# Patient Record
Sex: MALE | Race: ASIAN | NOT HISPANIC OR LATINO | Employment: OTHER
[De-identification: names, ages, dates, MRNs, and addresses within clinical notes are randomized per-mention and may not be internally consistent; named-entity substitution may affect disease eponyms.]

---

## 2017-03-02 ENCOUNTER — HOSPITAL (OUTPATIENT)
Dept: OTHER | Age: 77
End: 2017-03-02
Attending: INTERNAL MEDICINE

## 2017-04-14 ENCOUNTER — PRIOR ORIGINAL RECORDS (OUTPATIENT)
Dept: OTHER | Age: 77
End: 2017-04-14

## 2017-04-20 ENCOUNTER — PRIOR ORIGINAL RECORDS (OUTPATIENT)
Dept: OTHER | Age: 77
End: 2017-04-20

## 2017-04-20 LAB
BUN: 43 MG/DL
CALCIUM: 9.5 MG/DL
CHLORIDE: 100 MEQ/L
CREATININE, SERUM: 2.54 MG/DL
GLUCOSE: 114 MG/DL
GLUCOSE: 114 MG/DL
HEMOGLOBIN A1C: 7.9 %
POTASSIUM, SERUM: 4.4 MEQ/L
SODIUM: 136 MEQ/L
VITAMIN D 25-OH: 44 NG/ML

## 2017-04-27 ENCOUNTER — HOSPITAL (OUTPATIENT)
Dept: OTHER | Age: 77
End: 2017-04-27
Attending: INTERNAL MEDICINE

## 2017-04-27 ENCOUNTER — PRIOR ORIGINAL RECORDS (OUTPATIENT)
Dept: OTHER | Age: 77
End: 2017-04-27

## 2017-04-27 LAB
ANION GAP SERPL CALC-SCNC: 12 MMOL/L (ref 10–20)
BNP SERPL-MCNC: 183 PG/ML
BUN SERPL-MCNC: 44 MG/DL (ref 6–20)
BUN/CREAT SERPL: 18 (ref 7–25)
CALCIUM SERPL-MCNC: 9.1 MG/DL (ref 8.4–10.2)
CHLORIDE: 99 MMOL/L (ref 98–107)
CO2 SERPL-SCNC: 28 MMOL/L (ref 21–32)
CREAT SERPL-MCNC: 2.39 MG/DL (ref 0.67–1.17)
GLUCOSE SERPL-MCNC: 204 MG/DL (ref 65–99)
LENGTH OF FAST TIME PATIENT: ABNORMAL HR
POTASSIUM SERPL-SCNC: 3.9 MMOL/L (ref 3.4–5.1)
SODIUM SERPL-SCNC: 135 MMOL/L (ref 135–145)

## 2017-04-28 ENCOUNTER — PRIOR ORIGINAL RECORDS (OUTPATIENT)
Dept: OTHER | Age: 77
End: 2017-04-28

## 2017-04-28 LAB
BNP: 183 PMOL/L
BUN: 44 MG/DL
CALCIUM: 9.1 MG/DL
CHLORIDE: 99 MEQ/L
CREATININE, SERUM: 2.39 MG/DL
GLUCOSE: 204 MG/DL
POTASSIUM, SERUM: 3.9 MEQ/L
SODIUM: 135 MEQ/L

## 2017-05-04 ENCOUNTER — PRIOR ORIGINAL RECORDS (OUTPATIENT)
Dept: OTHER | Age: 77
End: 2017-05-04

## 2017-05-04 ENCOUNTER — MYAURORA ACCOUNT LINK (OUTPATIENT)
Dept: OTHER | Age: 77
End: 2017-05-04

## 2017-06-07 ENCOUNTER — HOSPITAL (OUTPATIENT)
Dept: OTHER | Age: 77
End: 2017-06-07
Attending: INTERNAL MEDICINE

## 2017-06-30 ENCOUNTER — PRIOR ORIGINAL RECORDS (OUTPATIENT)
Dept: OTHER | Age: 77
End: 2017-06-30

## 2017-07-12 ENCOUNTER — HOSPITAL (OUTPATIENT)
Dept: OTHER | Age: 77
End: 2017-07-12
Attending: INTERNAL MEDICINE

## 2017-07-31 ENCOUNTER — HOSPITAL (OUTPATIENT)
Dept: OTHER | Age: 77
End: 2017-07-31
Attending: INTERNAL MEDICINE

## 2018-02-22 ENCOUNTER — HOSPITAL (OUTPATIENT)
Dept: OTHER | Age: 78
End: 2018-02-22
Attending: INTERNAL MEDICINE

## 2018-03-28 ENCOUNTER — PRIOR ORIGINAL RECORDS (OUTPATIENT)
Dept: OTHER | Age: 78
End: 2018-03-28

## 2018-04-04 ENCOUNTER — PRIOR ORIGINAL RECORDS (OUTPATIENT)
Dept: OTHER | Age: 78
End: 2018-04-04

## 2018-04-10 ENCOUNTER — PRIOR ORIGINAL RECORDS (OUTPATIENT)
Dept: OTHER | Age: 78
End: 2018-04-10

## 2018-07-24 ENCOUNTER — MYAURORA ACCOUNT LINK (OUTPATIENT)
Dept: OTHER | Age: 78
End: 2018-07-24

## 2018-07-24 ENCOUNTER — PRIOR ORIGINAL RECORDS (OUTPATIENT)
Dept: OTHER | Age: 78
End: 2018-07-24

## 2018-07-25 LAB
CALCIUM: 8.9 MG/DL
CHLORIDE: 98 MEQ/L
CREATININE, SERUM: 2.46 MG/DL
GLUCOSE: 173 MG/DL
POTASSIUM, SERUM: 4.3 MEQ/L
SODIUM: 132 MEQ/L

## 2018-08-31 ENCOUNTER — HOSPITAL (OUTPATIENT)
Dept: OTHER | Age: 78
End: 2018-08-31
Attending: INTERNAL MEDICINE

## 2018-12-13 ENCOUNTER — HOSPITAL (OUTPATIENT)
Dept: OTHER | Age: 78
End: 2018-12-13
Attending: SPECIALIST

## 2019-01-02 ENCOUNTER — HOSPITAL (OUTPATIENT)
Dept: OTHER | Age: 79
End: 2019-01-02
Attending: INTERNAL MEDICINE

## 2019-01-03 ENCOUNTER — HOSPITAL (OUTPATIENT)
Dept: OTHER | Age: 79
End: 2019-01-03
Attending: SPECIALIST

## 2019-02-28 VITALS
HEART RATE: 58 BPM | WEIGHT: 194 LBS | DIASTOLIC BLOOD PRESSURE: 60 MMHG | BODY MASS INDEX: 34.38 KG/M2 | HEIGHT: 63 IN | SYSTOLIC BLOOD PRESSURE: 110 MMHG

## 2019-02-28 VITALS — SYSTOLIC BLOOD PRESSURE: 128 MMHG | HEART RATE: 60 BPM | DIASTOLIC BLOOD PRESSURE: 70 MMHG | WEIGHT: 194 LBS

## 2019-02-28 VITALS — HEART RATE: 62 BPM | WEIGHT: 195 LBS | SYSTOLIC BLOOD PRESSURE: 128 MMHG | DIASTOLIC BLOOD PRESSURE: 60 MMHG

## 2019-03-01 VITALS
SYSTOLIC BLOOD PRESSURE: 135 MMHG | OXYGEN SATURATION: 99 % | HEART RATE: 53 BPM | WEIGHT: 193.5 LBS | DIASTOLIC BLOOD PRESSURE: 46 MMHG | BODY MASS INDEX: 34.29 KG/M2 | HEIGHT: 63 IN

## 2019-03-01 VITALS
RESPIRATION RATE: 16 BRPM | DIASTOLIC BLOOD PRESSURE: 58 MMHG | HEIGHT: 63 IN | BODY MASS INDEX: 33.84 KG/M2 | WEIGHT: 191 LBS | SYSTOLIC BLOOD PRESSURE: 128 MMHG | HEART RATE: 56 BPM

## 2019-06-06 ENCOUNTER — HOSPITAL (OUTPATIENT)
Dept: OTHER | Age: 79
End: 2019-06-06
Attending: INTERNAL MEDICINE

## 2019-07-02 ENCOUNTER — HOSPITAL (OUTPATIENT)
Dept: OTHER | Age: 79
End: 2019-07-02
Attending: EMERGENCY MEDICINE

## 2019-07-02 LAB
APPEARANCE UR: CLEAR
BILIRUB UR QL STRIP: NEGATIVE
COLOR UR: YELLOW
GLUCOSE UR STRIP-MCNC: NEGATIVE MG/DL
HEMOCCULT STL QL: NEGATIVE
KETONES UR STRIP-MCNC: NEGATIVE MG/DL
LEUKOCYTE ESTERASE UR QL STRIP: NEGATIVE
NITRITE UR QL STRIP: NEGATIVE
PH UR STRIP: 5.5 UNITS (ref 5–7)
PROT UR STRIP-MCNC: NEGATIVE MG/DL
SP GR UR STRIP: 1.01 (ref 1–1.03)
UROBILINOGEN UR STRIP-MCNC: 0.2 MG/DL (ref 0–1)

## 2019-07-06 ENCOUNTER — HOSPITAL ENCOUNTER (EMERGENCY)
Facility: HOSPITAL | Age: 79
Discharge: HOME | End: 2019-07-06
Attending: EMERGENCY MEDICINE
Payer: MEDICAID

## 2019-07-06 ENCOUNTER — APPOINTMENT (OUTPATIENT)
Dept: CT IMAGING | Facility: HOSPITAL | Age: 79
End: 2019-07-06
Attending: EMERGENCY MEDICINE
Payer: MEDICAID

## 2019-07-06 VITALS
RESPIRATION RATE: 16 BRPM | OXYGEN SATURATION: 95 % | SYSTOLIC BLOOD PRESSURE: 139 MMHG | TEMPERATURE: 99 F | HEIGHT: 63 IN | WEIGHT: 199 LBS | HEART RATE: 58 BPM | DIASTOLIC BLOOD PRESSURE: 71 MMHG | BODY MASS INDEX: 35.26 KG/M2

## 2019-07-06 DIAGNOSIS — M54.9 BACK PAIN WITH RADIATION: Primary | ICD-10-CM

## 2019-07-06 LAB
ALBUMIN SERPL-MCNC: 3.5 G/DL (ref 3.4–5)
ALBUMIN/GLOB SERPL: 0.8 {RATIO} (ref 1–2)
ALP LIVER SERPL-CCNC: 88 U/L (ref 45–117)
ALT SERPL-CCNC: 20 U/L (ref 16–61)
ANION GAP SERPL CALC-SCNC: 7 MMOL/L (ref 0–18)
AST SERPL-CCNC: 19 U/L (ref 15–37)
BASOPHILS # BLD AUTO: 0.04 X10(3) UL (ref 0–0.2)
BASOPHILS NFR BLD AUTO: 0.4 %
BILIRUB SERPL-MCNC: 0.6 MG/DL (ref 0.1–2)
BILIRUB UR QL STRIP.AUTO: NEGATIVE
BUN BLD-MCNC: 60 MG/DL (ref 7–18)
BUN/CREAT SERPL: 19.6 (ref 10–20)
CALCIUM BLD-MCNC: 9.1 MG/DL (ref 8.5–10.1)
CHLORIDE SERPL-SCNC: 101 MMOL/L (ref 98–112)
CLARITY UR REFRACT.AUTO: CLEAR
CO2 SERPL-SCNC: 26 MMOL/L (ref 21–32)
COLOR UR AUTO: YELLOW
CREAT BLD-MCNC: 3.06 MG/DL (ref 0.7–1.3)
DEPRECATED RDW RBC AUTO: 45.2 FL (ref 35.1–46.3)
EOSINOPHIL # BLD AUTO: 0.31 X10(3) UL (ref 0–0.7)
EOSINOPHIL NFR BLD AUTO: 3.4 %
ERYTHROCYTE [DISTWIDTH] IN BLOOD BY AUTOMATED COUNT: 14.1 % (ref 11–15)
GLOBULIN PLAS-MCNC: 4.5 G/DL (ref 2.8–4.4)
GLUCOSE BLD-MCNC: 170 MG/DL (ref 70–99)
GLUCOSE UR STRIP.AUTO-MCNC: NEGATIVE MG/DL
HCT VFR BLD AUTO: 32.2 % (ref 39–53)
HGB BLD-MCNC: 10.7 G/DL (ref 13–17.5)
IMM GRANULOCYTES # BLD AUTO: 0.03 X10(3) UL (ref 0–1)
IMM GRANULOCYTES NFR BLD: 0.3 %
KETONES UR STRIP.AUTO-MCNC: NEGATIVE MG/DL
LEUKOCYTE ESTERASE UR QL STRIP.AUTO: NEGATIVE
LYMPHOCYTES # BLD AUTO: 2.03 X10(3) UL (ref 1–4)
LYMPHOCYTES NFR BLD AUTO: 22.3 %
M PROTEIN MFR SERPL ELPH: 8 G/DL (ref 6.4–8.2)
MCH RBC QN AUTO: 29.1 PG (ref 26–34)
MCHC RBC AUTO-ENTMCNC: 33.2 G/DL (ref 31–37)
MCV RBC AUTO: 87.5 FL (ref 80–100)
MONOCYTES # BLD AUTO: 1.04 X10(3) UL (ref 0.1–1)
MONOCYTES NFR BLD AUTO: 11.4 %
NEUTROPHILS # BLD AUTO: 5.65 X10 (3) UL (ref 1.5–7.7)
NEUTROPHILS # BLD AUTO: 5.65 X10(3) UL (ref 1.5–7.7)
NEUTROPHILS NFR BLD AUTO: 62.2 %
NITRITE UR QL STRIP.AUTO: NEGATIVE
OSMOLALITY SERPL CALC.SUM OF ELEC: 299 MOSM/KG (ref 275–295)
PH UR STRIP.AUTO: 5 [PH] (ref 4.5–8)
PLATELET # BLD AUTO: 189 10(3)UL (ref 150–450)
POTASSIUM SERPL-SCNC: 3.8 MMOL/L (ref 3.5–5.1)
PROT UR STRIP.AUTO-MCNC: NEGATIVE MG/DL
RBC # BLD AUTO: 3.68 X10(6)UL (ref 3.8–5.8)
RBC UR QL AUTO: NEGATIVE
SODIUM SERPL-SCNC: 134 MMOL/L (ref 136–145)
SP GR UR STRIP.AUTO: 1.01 (ref 1–1.03)
UROBILINOGEN UR STRIP.AUTO-MCNC: <2 MG/DL
WBC # BLD AUTO: 9.1 X10(3) UL (ref 4–11)

## 2019-07-06 PROCEDURE — 99284 EMERGENCY DEPT VISIT MOD MDM: CPT

## 2019-07-06 PROCEDURE — 81003 URINALYSIS AUTO W/O SCOPE: CPT | Performed by: EMERGENCY MEDICINE

## 2019-07-06 PROCEDURE — 85025 COMPLETE CBC W/AUTO DIFF WBC: CPT | Performed by: EMERGENCY MEDICINE

## 2019-07-06 PROCEDURE — 96374 THER/PROPH/DIAG INJ IV PUSH: CPT

## 2019-07-06 PROCEDURE — 74176 CT ABD & PELVIS W/O CONTRAST: CPT | Performed by: EMERGENCY MEDICINE

## 2019-07-06 PROCEDURE — 80053 COMPREHEN METABOLIC PANEL: CPT | Performed by: EMERGENCY MEDICINE

## 2019-07-06 RX ORDER — TRAMADOL HYDROCHLORIDE 50 MG/1
TABLET ORAL EVERY 6 HOURS PRN
Qty: 10 TABLET | Refills: 0 | Status: SHIPPED | OUTPATIENT
Start: 2019-07-06 | End: 2019-07-13

## 2019-07-06 RX ORDER — CARVEDILOL 6.25 MG/1
6.25 TABLET ORAL DAILY
COMMUNITY

## 2019-07-06 RX ORDER — TAMSULOSIN HYDROCHLORIDE 0.4 MG/1
0.4 CAPSULE ORAL DAILY
COMMUNITY

## 2019-07-06 RX ORDER — OMEPRAZOLE 40 MG/1
40 CAPSULE, DELAYED RELEASE ORAL DAILY
COMMUNITY

## 2019-07-06 RX ORDER — GLIPIZIDE 5 MG/1
5 TABLET ORAL 2 TIMES DAILY
Status: ON HOLD | COMMUNITY
End: 2021-08-28

## 2019-07-06 RX ORDER — MORPHINE SULFATE 4 MG/ML
2 INJECTION, SOLUTION INTRAMUSCULAR; INTRAVENOUS EVERY 30 MIN PRN
Status: DISCONTINUED | OUTPATIENT
Start: 2019-07-06 | End: 2019-07-06

## 2019-07-06 NOTE — ED NOTES
Round on pt. Pt sleeping on arrival to room. Daughter at bedsie. Pt easily arouseable. Pt sts back pain is better.  Pt pending transport to CT

## 2019-07-06 NOTE — ED NOTES
Pt to ED with daughter for back pain x1 week. Pt was seen by MD this week for back pain, sts pain is not any better. Denies any injury. Pt taking Mather at home for pain.

## 2019-07-06 NOTE — ED NOTES
Round on pt. Pt sleeping on arrival to room but easily arouseable. Pt provided urinal for urine sample. Daughter at bedside.  Updated on poc

## 2019-07-06 NOTE — ED PROVIDER NOTES
Patient Seen in: [facility] Emergency Department    History   Patient presents with:  Back Pain (musculoskeletal)    Stated Complaint: BACK PAIN    HPI    This is a pleasant 61-year-old male presenting to the emergency department for back pain and a Globulin  4.5 (*)     A/G Ratio 0.8 (*)     All other components within normal limits   CBC W/ DIFFERENTIAL - Abnormal; Notable for the following components:    RBC 3.68 (*)     HGB 10.7 (*)     HCT 32.2 (*)     Monocyte Absolute 1.04 (*)     All other com

## 2019-07-11 ENCOUNTER — HOSPITAL (OUTPATIENT)
Dept: OTHER | Age: 79
End: 2019-07-11
Attending: INTERNAL MEDICINE

## 2019-08-22 ENCOUNTER — TELEPHONE (OUTPATIENT)
Dept: NEUROSURGERY | Age: 79
End: 2019-08-22

## 2019-09-11 ENCOUNTER — HOSPITAL (OUTPATIENT)
Dept: OTHER | Age: 79
End: 2019-09-11
Attending: INTERNAL MEDICINE

## 2021-03-13 ENCOUNTER — HOSPITAL ENCOUNTER (OUTPATIENT)
Dept: CT IMAGING | Age: 81
Discharge: HOME | End: 2021-03-13
Attending: INTERNAL MEDICINE

## 2021-03-13 ENCOUNTER — HOSPITAL ENCOUNTER (OUTPATIENT)
Dept: MRI IMAGING | Age: 81
Discharge: HOME | End: 2021-03-13
Attending: INTERNAL MEDICINE

## 2021-03-13 DIAGNOSIS — R42 DIZZINESS AND GIDDINESS: ICD-10-CM

## 2021-03-13 DIAGNOSIS — C64.9 ADENOCARCINOMA, RENAL CELL (CMD): ICD-10-CM

## 2021-03-13 PROCEDURE — 74176 CT ABD & PELVIS W/O CONTRAST: CPT

## 2021-03-13 PROCEDURE — 70551 MRI BRAIN STEM W/O DYE: CPT

## 2021-08-28 ENCOUNTER — WALK IN (OUTPATIENT)
Dept: URGENT CARE | Age: 81
End: 2021-08-28
Attending: EMERGENCY MEDICINE

## 2021-08-28 ENCOUNTER — HOSPITAL ENCOUNTER (OUTPATIENT)
Facility: HOSPITAL | Age: 81
Setting detail: OBSERVATION
Discharge: HOME | End: 2021-08-30
Attending: EMERGENCY MEDICINE | Admitting: INTERNAL MEDICINE
Payer: MEDICARE

## 2021-08-28 ENCOUNTER — APPOINTMENT (OUTPATIENT)
Dept: ULTRASOUND IMAGING | Facility: HOSPITAL | Age: 81
End: 2021-08-28
Attending: EMERGENCY MEDICINE
Payer: MEDICARE

## 2021-08-28 ENCOUNTER — APPOINTMENT (OUTPATIENT)
Dept: GENERAL RADIOLOGY | Facility: HOSPITAL | Age: 81
End: 2021-08-28
Attending: EMERGENCY MEDICINE
Payer: MEDICARE

## 2021-08-28 VITALS — OXYGEN SATURATION: 100 % | DIASTOLIC BLOOD PRESSURE: 75 MMHG | HEART RATE: 64 BPM | SYSTOLIC BLOOD PRESSURE: 144 MMHG

## 2021-08-28 DIAGNOSIS — M79.602 LEFT ARM PAIN: ICD-10-CM

## 2021-08-28 DIAGNOSIS — M19.032 ARTHRITIS OF WRIST, LEFT: ICD-10-CM

## 2021-08-28 DIAGNOSIS — R07.9 CHEST PAIN OF UNCERTAIN ETIOLOGY: Primary | ICD-10-CM

## 2021-08-28 DIAGNOSIS — R60.0 EDEMA OF LEFT UPPER ARM: ICD-10-CM

## 2021-08-28 DIAGNOSIS — R07.9 CHEST PAIN, UNSPECIFIED TYPE: Primary | ICD-10-CM

## 2021-08-28 PROBLEM — D64.9 ANEMIA: Status: ACTIVE | Noted: 2021-08-28

## 2021-08-28 PROBLEM — E87.1 HYPONATREMIA: Status: ACTIVE | Noted: 2021-08-28

## 2021-08-28 PROBLEM — R73.9 HYPERGLYCEMIA: Status: ACTIVE | Noted: 2021-08-28

## 2021-08-28 LAB
ALBUMIN SERPL-MCNC: 2.9 G/DL (ref 3.4–5)
ALBUMIN/GLOB SERPL: 0.6 {RATIO} (ref 1–2)
ALP LIVER SERPL-CCNC: 82 U/L
ALT SERPL-CCNC: 14 U/L
ANION GAP SERPL CALC-SCNC: 8 MMOL/L (ref 0–18)
AST SERPL-CCNC: 14 U/L (ref 15–37)
ATRIAL RATE (BPM): 65
BASOPHILS # BLD AUTO: 0.05 X10(3) UL (ref 0–0.2)
BASOPHILS NFR BLD AUTO: 0.5 %
BILIRUB SERPL-MCNC: 0.6 MG/DL (ref 0.1–2)
BUN BLD-MCNC: 45 MG/DL (ref 7–18)
CALCIUM BLD-MCNC: 8.9 MG/DL (ref 8.5–10.1)
CHLORIDE SERPL-SCNC: 98 MMOL/L (ref 98–112)
CO2 SERPL-SCNC: 26 MMOL/L (ref 21–32)
CREAT BLD-MCNC: 2.61 MG/DL
EOSINOPHIL # BLD AUTO: 0.35 X10(3) UL (ref 0–0.7)
EOSINOPHIL NFR BLD AUTO: 3.3 %
ERYTHROCYTE [DISTWIDTH] IN BLOOD BY AUTOMATED COUNT: 14.4 %
GLOBULIN PLAS-MCNC: 4.9 G/DL (ref 2.8–4.4)
GLUCOSE BLD-MCNC: 201 MG/DL (ref 70–99)
GLUCOSE BLD-MCNC: 215 MG/DL (ref 70–99)
HCT VFR BLD AUTO: 32.9 %
HGB BLD-MCNC: 11 G/DL
IMM GRANULOCYTES # BLD AUTO: 0.07 X10(3) UL (ref 0–1)
IMM GRANULOCYTES NFR BLD: 0.7 %
LYMPHOCYTES # BLD AUTO: 2.62 X10(3) UL (ref 1–4)
LYMPHOCYTES NFR BLD AUTO: 25 %
M PROTEIN MFR SERPL ELPH: 7.8 G/DL (ref 6.4–8.2)
MCH RBC QN AUTO: 27.8 PG (ref 26–34)
MCHC RBC AUTO-ENTMCNC: 33.4 G/DL (ref 31–37)
MCV RBC AUTO: 83.1 FL
MONOCYTES # BLD AUTO: 1.13 X10(3) UL (ref 0.1–1)
MONOCYTES NFR BLD AUTO: 10.8 %
NEUTROPHILS # BLD AUTO: 6.27 X10 (3) UL (ref 1.5–7.7)
NEUTROPHILS # BLD AUTO: 6.27 X10(3) UL (ref 1.5–7.7)
NEUTROPHILS NFR BLD AUTO: 59.7 %
NT-PROBNP SERPL-MCNC: 645 PG/ML (ref ?–450)
OSMOLALITY SERPL CALC.SUM OF ELEC: 292 MOSM/KG (ref 275–295)
P AXIS (DEGREES): 33
PLATELET # BLD AUTO: 299 10(3)UL (ref 150–450)
POTASSIUM SERPL-SCNC: 3.6 MMOL/L (ref 3.5–5.1)
PR-INTERVAL (MSEC): 150
QRS-INTERVAL (MSEC): 76
QT-INTERVAL (MSEC): 410
QTC: 426
R AXIS (DEGREES): 14
RBC # BLD AUTO: 3.96 X10(6)UL
REPORT TEXT: NORMAL
SARS-COV-2 RNA RESP QL NAA+PROBE: NOT DETECTED
SODIUM SERPL-SCNC: 132 MMOL/L (ref 136–145)
T AXIS (DEGREES): 27
TROPONIN I SERPL-MCNC: <0.045 NG/ML (ref ?–0.04)
URATE SERPL-MCNC: 9.5 MG/DL
VENTRICULAR RATE EKG/MIN (BPM): 65
WBC # BLD AUTO: 10.5 X10(3) UL (ref 4–11)

## 2021-08-28 PROCEDURE — 99214 OFFICE O/P EST MOD 30 MIN: CPT

## 2021-08-28 PROCEDURE — 84484 ASSAY OF TROPONIN QUANT: CPT

## 2021-08-28 PROCEDURE — 36415 COLL VENOUS BLD VENIPUNCTURE: CPT

## 2021-08-28 PROCEDURE — 83880 ASSAY OF NATRIURETIC PEPTIDE: CPT | Performed by: EMERGENCY MEDICINE

## 2021-08-28 PROCEDURE — 80053 COMPREHEN METABOLIC PANEL: CPT | Performed by: EMERGENCY MEDICINE

## 2021-08-28 PROCEDURE — 71045 X-RAY EXAM CHEST 1 VIEW: CPT | Performed by: EMERGENCY MEDICINE

## 2021-08-28 PROCEDURE — 82962 GLUCOSE BLOOD TEST: CPT

## 2021-08-28 PROCEDURE — 99285 EMERGENCY DEPT VISIT HI MDM: CPT

## 2021-08-28 PROCEDURE — 80053 COMPREHEN METABOLIC PANEL: CPT

## 2021-08-28 PROCEDURE — 73090 X-RAY EXAM OF FOREARM: CPT | Performed by: EMERGENCY MEDICINE

## 2021-08-28 PROCEDURE — 93971 EXTREMITY STUDY: CPT | Performed by: EMERGENCY MEDICINE

## 2021-08-28 PROCEDURE — 93005 ELECTROCARDIOGRAM TRACING: CPT | Performed by: EMERGENCY MEDICINE

## 2021-08-28 PROCEDURE — 84484 ASSAY OF TROPONIN QUANT: CPT | Performed by: EMERGENCY MEDICINE

## 2021-08-28 PROCEDURE — 93010 ELECTROCARDIOGRAM REPORT: CPT

## 2021-08-28 PROCEDURE — 93005 ELECTROCARDIOGRAM TRACING: CPT

## 2021-08-28 PROCEDURE — 84550 ASSAY OF BLOOD/URIC ACID: CPT | Performed by: EMERGENCY MEDICINE

## 2021-08-28 PROCEDURE — 85025 COMPLETE CBC W/AUTO DIFF WBC: CPT

## 2021-08-28 PROCEDURE — 85025 COMPLETE CBC W/AUTO DIFF WBC: CPT | Performed by: EMERGENCY MEDICINE

## 2021-08-28 RX ORDER — ASPIRIN 81 MG/1
324 TABLET, CHEWABLE ORAL ONCE
Status: COMPLETED | OUTPATIENT
Start: 2021-08-28 | End: 2021-08-28

## 2021-08-28 RX ORDER — CLOPIDOGREL BISULFATE 75 MG/1
75 TABLET ORAL DAILY
COMMUNITY
Start: 2021-08-01

## 2021-08-28 RX ORDER — LISINOPRIL 10 MG/1
10 TABLET ORAL
COMMUNITY
End: 2021-08-28 | Stop reason: SDUPTHER

## 2021-08-28 RX ORDER — CARVEDILOL 6.25 MG/1
6.25 TABLET ORAL DAILY
COMMUNITY
Start: 2021-08-09

## 2021-08-28 RX ORDER — OMEPRAZOLE 40 MG/1
40 CAPSULE, DELAYED RELEASE ORAL DAILY
COMMUNITY
Start: 2021-08-01

## 2021-08-28 RX ORDER — INSULIN GLARGINE 100 [IU]/ML
INJECTION, SOLUTION SUBCUTANEOUS
COMMUNITY
Start: 2021-08-01

## 2021-08-28 RX ORDER — TAMSULOSIN HYDROCHLORIDE 0.4 MG/1
0.4 CAPSULE ORAL DAILY
COMMUNITY
Start: 2021-08-01

## 2021-08-28 RX ORDER — ASPIRIN 81 MG/1
81 TABLET ORAL
COMMUNITY

## 2021-08-28 RX ORDER — TORSEMIDE 20 MG/1
TABLET ORAL
COMMUNITY
Start: 2021-08-01

## 2021-08-28 RX ORDER — CLOPIDOGREL BISULFATE 75 MG/1
75 TABLET ORAL DAILY
COMMUNITY

## 2021-08-28 RX ORDER — MELATONIN
1000 DAILY
COMMUNITY

## 2021-08-28 RX ORDER — ATORVASTATIN CALCIUM 20 MG/1
20 TABLET, FILM COATED ORAL AT BEDTIME
COMMUNITY
Start: 2021-08-01

## 2021-08-28 RX ORDER — CALCITRIOL 0.25 UG/1
0.25 CAPSULE, LIQUID FILLED ORAL DAILY
COMMUNITY

## 2021-08-28 RX ORDER — POTASSIUM CHLORIDE 20 MEQ/1
20 TABLET, EXTENDED RELEASE ORAL
COMMUNITY
Start: 2021-08-01

## 2021-08-28 RX ORDER — LOSARTAN POTASSIUM 100 MG/1
100 TABLET ORAL DAILY
COMMUNITY
End: 2021-08-28 | Stop reason: SDUPTHER

## 2021-08-28 RX ORDER — MELATONIN
25 DAILY
COMMUNITY
Start: 2021-08-02

## 2021-08-28 RX ORDER — VITAMIN B COMPLEX
1 TABLET ORAL
COMMUNITY

## 2021-08-28 RX ORDER — ALBUTEROL SULFATE 90 UG/1
AEROSOL, METERED RESPIRATORY (INHALATION)
COMMUNITY
Start: 2019-06-05

## 2021-08-28 RX ORDER — ATORVASTATIN CALCIUM 20 MG/1
20 TABLET, FILM COATED ORAL NIGHTLY
COMMUNITY

## 2021-08-28 RX ORDER — CALCITRIOL 0.25 UG/1
0.25 CAPSULE, LIQUID FILLED ORAL EVERY OTHER DAY
COMMUNITY
Start: 2021-08-01

## 2021-08-28 RX ORDER — TORSEMIDE 20 MG/1
20 TABLET ORAL DAILY
COMMUNITY

## 2021-08-28 RX ORDER — INSULIN LISPRO 100 [IU]/ML
INJECTION, SOLUTION INTRAVENOUS; SUBCUTANEOUS
COMMUNITY

## 2021-08-28 RX ORDER — MORPHINE SULFATE 4 MG/ML
4 INJECTION, SOLUTION INTRAMUSCULAR; INTRAVENOUS EVERY 30 MIN PRN
Status: DISCONTINUED | OUTPATIENT
Start: 2021-08-28 | End: 2021-08-30

## 2021-08-28 RX ORDER — INSULIN LISPRO 100 [IU]/ML
INJECTION, SOLUTION INTRAVENOUS; SUBCUTANEOUS
COMMUNITY
Start: 2021-08-01

## 2021-08-28 RX ORDER — POTASSIUM CHLORIDE 1.5 G/1.77G
20 POWDER, FOR SOLUTION ORAL DAILY
COMMUNITY

## 2021-08-28 ASSESSMENT — PAIN SCALES - GENERAL: PAINLEVEL_OUTOF10: 6

## 2021-08-28 NOTE — ED INITIAL ASSESSMENT (HPI)
Pt has a walker that he has arm rests on. That is how he pulls himself up. Left arm was swollen and then pain started to shoot up to his left chest. Pain also on right arm that radiates to elbow. Pt came from immediate care.

## 2021-08-29 ENCOUNTER — APPOINTMENT (OUTPATIENT)
Dept: CV DIAGNOSTICS | Facility: HOSPITAL | Age: 81
End: 2021-08-29
Attending: INTERNAL MEDICINE
Payer: MEDICARE

## 2021-08-29 LAB
ALBUMIN SERPL-MCNC: 2.7 G/DL (ref 3.4–5)
ALBUMIN SERPL-MCNC: 2.8 G/DL (ref 3.4–5)
ALBUMIN/GLOB SERPL: 0.6 {RATIO} (ref 1–2)
ALBUMIN/GLOB SERPL: 0.6 {RATIO} (ref 1–2)
ALP LIVER SERPL-CCNC: 66 U/L
ALP LIVER SERPL-CCNC: 73 U/L
ALT SERPL-CCNC: 11 U/L
ALT SERPL-CCNC: 13 U/L
ANION GAP SERPL CALC-SCNC: 7 MMOL/L (ref 0–18)
ANION GAP SERPL CALC-SCNC: 8 MMOL/L (ref 0–18)
AST SERPL-CCNC: 10 U/L (ref 15–37)
AST SERPL-CCNC: 12 U/L (ref 15–37)
ATRIAL RATE: 69 BPM
BASOPHILS # BLD AUTO: 0.06 X10(3) UL (ref 0–0.2)
BASOPHILS NFR BLD AUTO: 0.7 %
BILIRUB SERPL-MCNC: 0.6 MG/DL (ref 0.1–2)
BILIRUB SERPL-MCNC: 0.6 MG/DL (ref 0.1–2)
BUN BLD-MCNC: 41 MG/DL (ref 7–18)
BUN BLD-MCNC: 45 MG/DL (ref 7–18)
CALCIUM BLD-MCNC: 8.5 MG/DL (ref 8.5–10.1)
CALCIUM BLD-MCNC: 8.7 MG/DL (ref 8.5–10.1)
CHLORIDE SERPL-SCNC: 102 MMOL/L (ref 98–112)
CHLORIDE SERPL-SCNC: 103 MMOL/L (ref 98–112)
CO2 SERPL-SCNC: 25 MMOL/L (ref 21–32)
CO2 SERPL-SCNC: 26 MMOL/L (ref 21–32)
CREAT BLD-MCNC: 2.47 MG/DL
CREAT BLD-MCNC: 2.51 MG/DL
EOSINOPHIL # BLD AUTO: 0.31 X10(3) UL (ref 0–0.7)
EOSINOPHIL NFR BLD AUTO: 3.7 %
ERYTHROCYTE [DISTWIDTH] IN BLOOD BY AUTOMATED COUNT: 14.1 %
GLOBULIN PLAS-MCNC: 4.2 G/DL (ref 2.8–4.4)
GLOBULIN PLAS-MCNC: 4.7 G/DL (ref 2.8–4.4)
GLUCOSE BLD-MCNC: 128 MG/DL (ref 70–99)
GLUCOSE BLD-MCNC: 143 MG/DL (ref 70–99)
GLUCOSE BLD-MCNC: 149 MG/DL (ref 70–99)
GLUCOSE BLD-MCNC: 180 MG/DL (ref 70–99)
GLUCOSE BLD-MCNC: 180 MG/DL (ref 70–99)
GLUCOSE BLD-MCNC: 181 MG/DL (ref 70–99)
HCT VFR BLD AUTO: 30.9 %
HGB BLD-MCNC: 9.9 G/DL
IMM GRANULOCYTES # BLD AUTO: 0.02 X10(3) UL (ref 0–1)
IMM GRANULOCYTES NFR BLD: 0.2 %
LYMPHOCYTES # BLD AUTO: 1.81 X10(3) UL (ref 1–4)
LYMPHOCYTES NFR BLD AUTO: 21.5 %
M PROTEIN MFR SERPL ELPH: 6.9 G/DL (ref 6.4–8.2)
M PROTEIN MFR SERPL ELPH: 7.5 G/DL (ref 6.4–8.2)
MCH RBC QN AUTO: 26.9 PG (ref 26–34)
MCHC RBC AUTO-ENTMCNC: 32 G/DL (ref 31–37)
MCV RBC AUTO: 84 FL
MONOCYTES # BLD AUTO: 0.99 X10(3) UL (ref 0.1–1)
MONOCYTES NFR BLD AUTO: 11.8 %
NEUTROPHILS # BLD AUTO: 5.22 X10 (3) UL (ref 1.5–7.7)
NEUTROPHILS # BLD AUTO: 5.22 X10(3) UL (ref 1.5–7.7)
NEUTROPHILS NFR BLD AUTO: 62.1 %
OSMOLALITY SERPL CALC.SUM OF ELEC: 295 MOSM/KG (ref 275–295)
OSMOLALITY SERPL CALC.SUM OF ELEC: 296 MOSM/KG (ref 275–295)
P AXIS: 21 DEGREES
P-R INTERVAL: 138 MS
PLATELET # BLD AUTO: 303 10(3)UL (ref 150–450)
POTASSIUM SERPL-SCNC: 3.3 MMOL/L (ref 3.5–5.1)
POTASSIUM SERPL-SCNC: 3.7 MMOL/L (ref 3.5–5.1)
Q-T INTERVAL: 406 MS
QRS DURATION: 80 MS
QTC CALCULATION (BEZET): 435 MS
R AXIS: 6 DEGREES
RBC # BLD AUTO: 3.68 X10(6)UL
SODIUM SERPL-SCNC: 135 MMOL/L (ref 136–145)
SODIUM SERPL-SCNC: 136 MMOL/L (ref 136–145)
T AXIS: 23 DEGREES
TROPONIN I SERPL-MCNC: <0.045 NG/ML (ref ?–0.04)
URATE SERPL-MCNC: 9.8 MG/DL
VENTRICULAR RATE: 69 BPM
WBC # BLD AUTO: 8.4 X10(3) UL (ref 4–11)

## 2021-08-29 PROCEDURE — 80053 COMPREHEN METABOLIC PANEL: CPT | Performed by: INTERNAL MEDICINE

## 2021-08-29 PROCEDURE — 93306 TTE W/DOPPLER COMPLETE: CPT | Performed by: INTERNAL MEDICINE

## 2021-08-29 PROCEDURE — 84484 ASSAY OF TROPONIN QUANT: CPT | Performed by: INTERNAL MEDICINE

## 2021-08-29 PROCEDURE — 84550 ASSAY OF BLOOD/URIC ACID: CPT | Performed by: INTERNAL MEDICINE

## 2021-08-29 PROCEDURE — 82962 GLUCOSE BLOOD TEST: CPT

## 2021-08-29 PROCEDURE — 85025 COMPLETE CBC W/AUTO DIFF WBC: CPT | Performed by: INTERNAL MEDICINE

## 2021-08-29 RX ORDER — PANTOPRAZOLE SODIUM 40 MG/1
40 TABLET, DELAYED RELEASE ORAL
Status: DISCONTINUED | OUTPATIENT
Start: 2021-08-29 | End: 2021-08-30

## 2021-08-29 RX ORDER — ACETAMINOPHEN 650 MG/1
650 SUPPOSITORY RECTAL EVERY 6 HOURS PRN
Status: DISCONTINUED | OUTPATIENT
Start: 2021-08-29 | End: 2021-08-30

## 2021-08-29 RX ORDER — CLOPIDOGREL BISULFATE 75 MG/1
75 TABLET ORAL DAILY
Status: DISCONTINUED | OUTPATIENT
Start: 2021-08-29 | End: 2021-08-30

## 2021-08-29 RX ORDER — TORSEMIDE 20 MG/1
20 TABLET ORAL DAILY
Status: DISCONTINUED | OUTPATIENT
Start: 2021-08-29 | End: 2021-08-30

## 2021-08-29 RX ORDER — TRAMADOL HYDROCHLORIDE 50 MG/1
50 TABLET ORAL EVERY 12 HOURS PRN
Status: DISCONTINUED | OUTPATIENT
Start: 2021-08-29 | End: 2021-08-30

## 2021-08-29 RX ORDER — MELATONIN
1000 DAILY
Status: DISCONTINUED | OUTPATIENT
Start: 2021-08-29 | End: 2021-08-30

## 2021-08-29 RX ORDER — ACETAMINOPHEN 325 MG/1
650 TABLET ORAL EVERY 6 HOURS PRN
Status: DISCONTINUED | OUTPATIENT
Start: 2021-08-29 | End: 2021-08-30

## 2021-08-29 RX ORDER — ATORVASTATIN CALCIUM 20 MG/1
20 TABLET, FILM COATED ORAL NIGHTLY
Status: DISCONTINUED | OUTPATIENT
Start: 2021-08-29 | End: 2021-08-30

## 2021-08-29 RX ORDER — CARVEDILOL 6.25 MG/1
6.25 TABLET ORAL
Status: DISCONTINUED | OUTPATIENT
Start: 2021-08-29 | End: 2021-08-30

## 2021-08-29 RX ORDER — TAMSULOSIN HYDROCHLORIDE 0.4 MG/1
0.4 CAPSULE ORAL DAILY
Status: DISCONTINUED | OUTPATIENT
Start: 2021-08-29 | End: 2021-08-30

## 2021-08-29 RX ORDER — CALCITRIOL 0.25 UG/1
0.25 CAPSULE, LIQUID FILLED ORAL DAILY
Status: DISCONTINUED | OUTPATIENT
Start: 2021-08-29 | End: 2021-08-30

## 2021-08-29 RX ORDER — INSULIN LISPRO 100 [IU]/ML
INJECTION, SOLUTION INTRAVENOUS; SUBCUTANEOUS
Status: DISCONTINUED | OUTPATIENT
Start: 2021-08-29 | End: 2021-08-29

## 2021-08-29 RX ORDER — POTASSIUM CHLORIDE 1.5 G/1.77G
20 POWDER, FOR SOLUTION ORAL DAILY
Status: DISCONTINUED | OUTPATIENT
Start: 2021-08-29 | End: 2021-08-30

## 2021-08-29 NOTE — PROGRESS NOTES
08/28/21 2335 08/28/21 2337 08/28/21 2339   Vital Signs   Temp  --   --  98 °F (36.7 °C)   Temp src  --   --  Oral   Pulse 69 72 75   Resp 13 (!) 31  --    /57 136/60 140/56   MAP (mmHg) 80 78 77   BP Location Left arm  --   --    BP Method Automa

## 2021-08-29 NOTE — PLAN OF CARE
Pt alert able to make needs known. Son at bedside. C/o pain when left arm is touch. Arm swollen brace inplace . Pt has shuffling gait . plan of care discussed with pt and son . Will cont to monitor.

## 2021-08-29 NOTE — H&P
[address] Patient Status:  Observation    1940 MRN [id number]   [facility] 2NE-A Attending Ron Razo MD   Hosp Day # 0 PCP Pedro Hancock MD     Date:  2021  Date of Admission:  8 daily.    tamsulosin HCl 0.4 MG Oral Cap, Take 0.4 mg by mouth daily. Omeprazole 40 MG Oral Capsule Delayed Release, Take 40 mg by mouth daily. Review of Systems:   Pertinent items are noted in HPI.     Physical Exam:   Vital Signs:  Blood pressure Finalized by (CST): Dinorah Quezada MD on 8/28/2021 at 6:48 PM       EKG 12 Lead    Result Date: 8/29/2021  Normal sinus rhythm Septal infarct , age undetermined Abnormal ECG No previous ECGs available Confirmed by Jeff Clement M.D., Jessica Tong ([id number]) on 8/29/20

## 2021-08-29 NOTE — ED PROVIDER NOTES
Patient Seen in: [facility] Emergency Department      History   Patient presents with:  Chest Pain Angina    Stated Complaint: cp    HPI/Subjective:   HPI     Patient is a 53-year-old male who states that for the past 3 days he has had left wrist fo no meningismus. LUNGS: Lungs clear to auscultation bilaterally. CARDIOVASCULAR: + S1-S2, regular rate and rhythm, no murmurs. BACK: No CVA tenderness, no midline bony tenderness. ABDOMEN: + Bowel sounds, soft, nontender, nondistended.   No rebound, no g (*)     Sodium 135 (*)     BUN 41 (*)     Creatinine 2.47 (*)     GFR, Non- 24 (*)     GFR, -American 28 (*)     AST 12 (*)     ALT 13 (*)     Albumin 2.8 (*)     Globulin  4.7 (*)     A/G Ratio 0.6 (*)     All other components withi Final result                 Please view results for these tests on the individual orders. RAINBOW DRAW LAVENDER   RAINBOW DRAW LIGHT GREEN   RAINBOW DRAW GOLD     EKG    Rate, intervals and axes as noted on EKG Report.   Rate: 69  Rhythm: Sinus Rhythm  R

## 2021-08-30 ENCOUNTER — APPOINTMENT (OUTPATIENT)
Dept: CV DIAGNOSTICS | Facility: HOSPITAL | Age: 81
End: 2021-08-30
Attending: INTERNAL MEDICINE
Payer: MEDICARE

## 2021-08-30 VITALS
WEIGHT: 188 LBS | HEART RATE: 75 BPM | DIASTOLIC BLOOD PRESSURE: 64 MMHG | RESPIRATION RATE: 18 BRPM | SYSTOLIC BLOOD PRESSURE: 132 MMHG | OXYGEN SATURATION: 99 % | HEIGHT: 65 IN | BODY MASS INDEX: 31.32 KG/M2 | TEMPERATURE: 98 F

## 2021-08-30 LAB
BASOPHILS # BLD AUTO: 0.05 X10(3) UL (ref 0–0.2)
BASOPHILS NFR BLD AUTO: 0.5 %
EOSINOPHIL # BLD AUTO: 0.27 X10(3) UL (ref 0–0.7)
EOSINOPHIL NFR BLD AUTO: 2.8 %
ERYTHROCYTE [DISTWIDTH] IN BLOOD BY AUTOMATED COUNT: 14 %
GLUCOSE BLD-MCNC: 113 MG/DL (ref 70–99)
GLUCOSE BLD-MCNC: 202 MG/DL (ref 70–99)
GLUCOSE BLD-MCNC: 96 MG/DL (ref 70–99)
HCT VFR BLD AUTO: 31.8 %
HGB BLD-MCNC: 10 G/DL
IMM GRANULOCYTES # BLD AUTO: 0.05 X10(3) UL (ref 0–1)
IMM GRANULOCYTES NFR BLD: 0.5 %
LYMPHOCYTES # BLD AUTO: 1.82 X10(3) UL (ref 1–4)
LYMPHOCYTES NFR BLD AUTO: 18.7 %
MCH RBC QN AUTO: 26.8 PG (ref 26–34)
MCHC RBC AUTO-ENTMCNC: 31.4 G/DL (ref 31–37)
MCV RBC AUTO: 85.3 FL
MONOCYTES # BLD AUTO: 1.19 X10(3) UL (ref 0.1–1)
MONOCYTES NFR BLD AUTO: 12.3 %
NEUTROPHILS # BLD AUTO: 6.33 X10 (3) UL (ref 1.5–7.7)
NEUTROPHILS # BLD AUTO: 6.33 X10(3) UL (ref 1.5–7.7)
NEUTROPHILS NFR BLD AUTO: 65.2 %
PLATELET # BLD AUTO: 350 10(3)UL (ref 150–450)
RBC # BLD AUTO: 3.73 X10(6)UL
WBC # BLD AUTO: 9.7 X10(3) UL (ref 4–11)

## 2021-08-30 PROCEDURE — 93018 CV STRESS TEST I&R ONLY: CPT | Performed by: INTERNAL MEDICINE

## 2021-08-30 PROCEDURE — 85025 COMPLETE CBC W/AUTO DIFF WBC: CPT | Performed by: INTERNAL MEDICINE

## 2021-08-30 PROCEDURE — 82962 GLUCOSE BLOOD TEST: CPT

## 2021-08-30 PROCEDURE — 93017 CV STRESS TEST TRACING ONLY: CPT | Performed by: INTERNAL MEDICINE

## 2021-08-30 PROCEDURE — 78452 HT MUSCLE IMAGE SPECT MULT: CPT | Performed by: INTERNAL MEDICINE

## 2021-08-30 NOTE — PLAN OF CARE
Received bedside report on this Pt., at 1555. Pt., awake, A&O, his son Alexey Crowder at bedside. Pt., is in SR on Tele monitor, sats greater than 92% on RA. PRN Tylenol, and later Ultram administered per Pt., request for left arm/wrist pain, effective, see MAR.

## 2021-08-30 NOTE — PROGRESS NOTES
CARDIODIAGNOSTIC PRELIMINARY REPORT:  With assistance of : Thania Huang completed, tolerated well     Second set of iimages pending

## 2021-08-30 NOTE — PLAN OF CARE
Pt. Alert and ox 4 , not in any form of distress. Denies any chest pain or discomfort. Tele shows NSR on the monitor. POC discussed with pt. and son at bedside , pt. verbalized understanding.     Problem: Diabetes/Glucose Control  Goal: Glucose maint

## 2021-08-30 NOTE — PLAN OF CARE
Patient alert and oriented x4. Son at bedside. Speaks [place], conversing in [place]. On RA. NSR on tele. Continent of bowel and bowel. Complaining of pain to left arm due to swelling. PRN pain medication administered with relief. POC stress test today.  Fall

## 2021-08-31 NOTE — PROGRESS NOTES
NURSING DISCHARGE NOTE    Discharged Home via Wheelchair. Accompanied by RN  Belongings Taken by patient/family. Patient discharged to home. Discharge instructions given to and understood by patient and patient's daughter bedside.  Appointments revi

## 2021-08-31 NOTE — PROGRESS NOTES
[address] Progress Note    Gatito Escobar Patient Status:  Observation    1940 MRN [id number]   [facility] 2NE-A Attending No att. providers found   Hosp Day # 0 PCP Keysha Moreno MD     Subjective:  Miguel Charlton to palpation, normoactive bowel sounds,  no masses, no organomegaly       Extremities:   Extremities normal, atraumatic, no cyanosis, no edema left habd sweliing  Able tomove   Pulses:   2+ and symmetric all extremities, pedal pulse palpable   Skin:   Skin

## 2021-12-15 ENCOUNTER — HOSPITAL ENCOUNTER (OUTPATIENT)
Dept: CV DIAGNOSTICS | Facility: HOSPITAL | Age: 81
Discharge: HOME | End: 2021-12-15
Attending: INTERNAL MEDICINE
Payer: MEDICARE

## 2021-12-15 DIAGNOSIS — R00.2 PALPITATIONS: ICD-10-CM

## 2021-12-15 PROCEDURE — 93226 XTRNL ECG REC<48 HR SCAN A/R: CPT | Performed by: INTERNAL MEDICINE

## 2021-12-15 PROCEDURE — 93225 XTRNL ECG REC<48 HRS REC: CPT | Performed by: INTERNAL MEDICINE

## 2021-12-15 PROCEDURE — 93227 XTRNL ECG REC<48 HR R&I: CPT | Performed by: INTERNAL MEDICINE

## 2021-12-22 NOTE — DISCHARGE SUMMARY
[address] Discharge Summary    Caitlyn Li Patient Status:  Observation    1940 MRN [id number]   [facility] 2NE-A Attending No att. providers found   Hosp Day # 0 PCP Erik Lombardi MD     Date of Admission: ROM normal, no CVA tenderness   Lungs:     Clear to auscultation bilaterally, respirations unlabored   Chest Wall:    No tenderness or deformity    Heart:    Regular rate and rhythm, S1 and S2 normal, no murmur at the RUSB, negative for rub or gallop   Abd medications which have NOT CHANGED    clopidogrel 75 MG Oral Tab  Take 75 mg by mouth daily. , Historical    atorvastatin 20 MG Oral Tab  Take 20 mg by mouth nightly., Historical    potassium chloride 20 MEQ Oral Powd Pack  Take 20 mEq by mouth daily. , Hist

## 2021-12-29 ENCOUNTER — TELEPHONE (OUTPATIENT)
Dept: OTHER | Age: 81
End: 2021-12-29

## 2022-01-06 ENCOUNTER — EXTERNAL RECORD (OUTPATIENT)
Dept: OTHER | Age: 82
End: 2022-01-06

## 2022-07-28 ENCOUNTER — OFFICE VISIT (OUTPATIENT)
Dept: ORTHOPEDICS CLINIC | Facility: CLINIC | Age: 82
End: 2022-07-28
Payer: MEDICARE

## 2022-07-28 VITALS — BODY MASS INDEX: 37.11 KG/M2 | WEIGHT: 189 LBS | HEIGHT: 60 IN

## 2022-07-28 DIAGNOSIS — G56.01 CARPAL TUNNEL SYNDROME OF RIGHT WRIST: ICD-10-CM

## 2022-07-28 DIAGNOSIS — M18.11 PRIMARY OSTEOARTHRITIS OF FIRST CARPOMETACARPAL JOINT OF RIGHT HAND: Primary | ICD-10-CM

## 2022-07-28 PROCEDURE — 20600 DRAIN/INJ JOINT/BURSA W/O US: CPT | Performed by: PHYSICIAN ASSISTANT

## 2022-07-28 PROCEDURE — 99203 OFFICE O/P NEW LOW 30 MIN: CPT | Performed by: PHYSICIAN ASSISTANT

## 2022-07-28 RX ORDER — TRIAMCINOLONE ACETONIDE 40 MG/ML
20 INJECTION, SUSPENSION INTRA-ARTICULAR; INTRAMUSCULAR ONCE
Status: COMPLETED | OUTPATIENT
Start: 2022-07-28 | End: 2022-07-28

## 2022-07-28 RX ADMIN — TRIAMCINOLONE ACETONIDE 20 MG: 40 INJECTION, SUSPENSION INTRA-ARTICULAR; INTRAMUSCULAR at 13:35:00

## 2022-08-30 LAB
ALBUMIN SERPL-MCNC: 3.6 G/DL (ref 3.6–5.1)
ALBUMIN/CREAT UR: 56 MCG/MG CREAT
ALBUMIN/GLOB SERPL: 1.2 (CALC) (ref 1–2.5)
ALP SERPL-CCNC: 91 U/L (ref 35–144)
ALT SERPL-CCNC: 8 U/L (ref 9–46)
AST SERPL-CCNC: 13 U/L (ref 10–35)
BASOPHILS # BLD AUTO: 53 CELLS/UL (ref 0–200)
BASOPHILS NFR BLD AUTO: 0.5 %
BILIRUB SERPL-MCNC: 0.4 MG/DL (ref 0.2–1.2)
BUN SERPL-MCNC: 58 MG/DL (ref 7–25)
BUN/CREAT SERPL: 25 (CALC) (ref 6–22)
CALCIUM SERPL-MCNC: 8.8 MG/DL (ref 8.6–10.3)
CHLORIDE SERPL-SCNC: 98 MMOL/L (ref 98–110)
CHOLEST SERPL-MCNC: 107 MG/DL
CHOLEST/HDLC SERPL: 3 (CALC)
CO2 SERPL-SCNC: 24 MMOL/L (ref 20–32)
CREAT SERPL-MCNC: 2.36 MG/DL (ref 0.7–1.28)
CREAT UR-MCNC: 36 MG/DL (ref 20–320)
EGFRCR SERPLBLD CKD-EPI 2021: 27 ML/MIN/1.73M2
EOSINOPHIL # BLD AUTO: 483 CELLS/UL (ref 15–500)
EOSINOPHIL NFR BLD AUTO: 4.6 %
ERYTHROCYTE [DISTWIDTH] IN BLOOD BY AUTOMATED COUNT: 15.3 % (ref 11–15)
GLOBULIN SER CALC-MCNC: 3.1 G/DL (CALC) (ref 1.9–3.7)
GLUCOSE SERPL-MCNC: 186 MG/DL (ref 65–139)
HCT VFR BLD AUTO: 30.3 % (ref 38.5–50)
HDLC SERPL-MCNC: 36 MG/DL
HGB BLD-MCNC: 9.7 G/DL (ref 13.2–17.1)
LDLC SERPL CALC-MCNC: 50 MG/DL (CALC)
LYMPHOCYTES # BLD AUTO: 2531 CELLS/UL (ref 850–3900)
LYMPHOCYTES NFR BLD AUTO: 24.1 %
MCH RBC QN AUTO: 27.3 PG (ref 27–33)
MCHC RBC AUTO-ENTMCNC: 32 G/DL (ref 32–36)
MCV RBC AUTO: 85.4 FL (ref 80–100)
MICROALBUMIN UR-MCNC: 2 MG/DL
MONOCYTES # BLD AUTO: 1071 CELLS/UL (ref 200–950)
MONOCYTES NFR BLD AUTO: 10.2 %
NEUTROPHILS # BLD AUTO: 6363 CELLS/UL (ref 1500–7800)
NEUTROPHILS NFR BLD AUTO: 60.6 %
NONHDLC SERPL-MCNC: 71 MG/DL (CALC)
PLATELET # BLD AUTO: 228 THOUSAND/UL (ref 140–400)
PMV BLD REES-ECKER: 10.2 FL (ref 7.5–12.5)
POTASSIUM SERPL-SCNC: 3.9 MMOL/L (ref 3.5–5.3)
PROT SERPL-MCNC: 6.7 G/DL (ref 6.1–8.1)
RBC # BLD AUTO: 3.55 MILLION/UL (ref 4.2–5.8)
SODIUM SERPL-SCNC: 132 MMOL/L (ref 135–146)
TRIGL SERPL-MCNC: 128 MG/DL
TSH SERPL-ACNC: 2.18 MIU/L (ref 0.4–4.5)
WBC # BLD AUTO: 10.5 THOUSAND/UL (ref 3.8–10.8)

## 2022-09-14 ENCOUNTER — PROCEDURE VISIT (OUTPATIENT)
Dept: PHYSICAL MEDICINE AND REHAB | Facility: CLINIC | Age: 82
End: 2022-09-14
Payer: MEDICARE

## 2022-09-14 DIAGNOSIS — R20.2 NUMBNESS AND TINGLING IN RIGHT HAND: ICD-10-CM

## 2022-09-14 DIAGNOSIS — M25.531 BILATERAL WRIST PAIN: ICD-10-CM

## 2022-09-14 DIAGNOSIS — R20.0 NUMBNESS AND TINGLING IN RIGHT HAND: ICD-10-CM

## 2022-09-14 DIAGNOSIS — M25.532 BILATERAL WRIST PAIN: ICD-10-CM

## 2022-09-14 PROCEDURE — 95886 MUSC TEST DONE W/N TEST COMP: CPT | Performed by: PHYSICAL MEDICINE & REHABILITATION

## 2022-09-14 PROCEDURE — 95911 NRV CNDJ TEST 9-10 STUDIES: CPT | Performed by: PHYSICAL MEDICINE & REHABILITATION

## 2022-10-17 DIAGNOSIS — Z01.812 PRE-PROCEDURAL LABORATORY EXAMINATION: Primary | ICD-10-CM

## 2022-10-19 ENCOUNTER — HOSPITAL ENCOUNTER (OUTPATIENT)
Dept: GASTROENTEROLOGY | Age: 82
Discharge: HOME | End: 2022-10-19
Attending: INTERNAL MEDICINE

## 2022-10-19 ENCOUNTER — ANESTHESIA (OUTPATIENT)
Dept: GASTROENTEROLOGY | Age: 82
End: 2022-10-19

## 2022-10-19 ENCOUNTER — ANESTHESIA EVENT (OUTPATIENT)
Dept: GASTROENTEROLOGY | Age: 82
End: 2022-10-19

## 2022-10-19 VITALS
HEIGHT: 60 IN | SYSTOLIC BLOOD PRESSURE: 153 MMHG | BODY MASS INDEX: 36.23 KG/M2 | TEMPERATURE: 98.6 F | RESPIRATION RATE: 10 BRPM | OXYGEN SATURATION: 99 % | DIASTOLIC BLOOD PRESSURE: 72 MMHG | HEART RATE: 68 BPM | WEIGHT: 184.53 LBS

## 2022-10-19 DIAGNOSIS — Z86.010 PERSONAL HISTORY OF COLONIC POLYPS: ICD-10-CM

## 2022-10-19 DIAGNOSIS — K64.9 HEMORRHOIDS, UNSPECIFIED HEMORRHOID TYPE: ICD-10-CM

## 2022-10-19 DIAGNOSIS — D64.9 ANEMIA: ICD-10-CM

## 2022-10-19 DIAGNOSIS — K29.70 GASTRITIS, PRESENCE OF BLEEDING UNSPECIFIED, UNSPECIFIED CHRONICITY, UNSPECIFIED GASTRITIS TYPE: ICD-10-CM

## 2022-10-19 DIAGNOSIS — K63.5 POLYP OF COLON, UNSPECIFIED PART OF COLON, UNSPECIFIED TYPE: ICD-10-CM

## 2022-10-19 LAB — GLUCOSE BLDC GLUCOMTR-MCNC: 172 MG/DL (ref 70–99)

## 2022-10-19 PROCEDURE — 13000029 HB GI MAJOR COMPLEX CASE EA ADD MINUTE

## 2022-10-19 PROCEDURE — 10004451 HB PACU RECOVERY 1ST 30 MINUTES

## 2022-10-19 PROCEDURE — 10002807 HB RX 258: Performed by: INTERNAL MEDICINE

## 2022-10-19 PROCEDURE — 13000028 HB GI MAJOR COMPLEX CASE S/U + 1ST 15 MIN

## 2022-10-19 PROCEDURE — 10002807 HB RX 258: Performed by: ANESTHESIOLOGY

## 2022-10-19 PROCEDURE — 82962 GLUCOSE BLOOD TEST: CPT

## 2022-10-19 PROCEDURE — 13000001 HB PHASE II RECOVERY EA 30 MINUTES

## 2022-10-19 PROCEDURE — 10002800 HB RX 250 W HCPCS: Performed by: ANESTHESIOLOGY

## 2022-10-19 PROCEDURE — 88305 TISSUE EXAM BY PATHOLOGIST: CPT | Performed by: INTERNAL MEDICINE

## 2022-10-19 PROCEDURE — 13000008 HB ANESTHESIA MAC OUTSIDE OR

## 2022-10-19 RX ORDER — 0.9 % SODIUM CHLORIDE 0.9 %
2 VIAL (ML) INJECTION EVERY 12 HOURS SCHEDULED
Status: CANCELLED | OUTPATIENT
Start: 2022-10-19

## 2022-10-19 RX ORDER — SODIUM CHLORIDE 9 MG/ML
INJECTION, SOLUTION INTRAVENOUS CONTINUOUS
Status: DISCONTINUED | OUTPATIENT
Start: 2022-10-19 | End: 2022-10-21 | Stop reason: HOSPADM

## 2022-10-19 RX ORDER — ACETAMINOPHEN 500 MG
1000 TABLET ORAL EVERY 6 HOURS PRN
COMMUNITY

## 2022-10-19 RX ORDER — SODIUM CHLORIDE 9 MG/ML
INJECTION, SOLUTION INTRAVENOUS CONTINUOUS PRN
Status: DISCONTINUED | OUTPATIENT
Start: 2022-10-19 | End: 2022-10-19

## 2022-10-19 RX ADMIN — SODIUM CHLORIDE: 9 INJECTION, SOLUTION INTRAVENOUS at 08:25

## 2022-10-19 RX ADMIN — SODIUM CHLORIDE: 9 INJECTION, SOLUTION INTRAVENOUS at 08:05

## 2022-10-19 RX ADMIN — PROPOFOL 50 MCG/KG/MIN: 10 INJECTION, EMULSION INTRAVENOUS at 08:50

## 2022-10-19 ASSESSMENT — PAIN SCALES - GENERAL
PAINLEVEL_OUTOF10: 0
PAINLEVEL_OUTOF10: 0
PAINLEVEL_OUTOF10: 8

## 2022-10-19 ASSESSMENT — PAIN SCALES - WONG BAKER
WONGBAKER_NUMERICALRESPONSE: 0
WONGBAKER_NUMERICALRESPONSE: 0

## 2022-10-19 ASSESSMENT — ACTIVITIES OF DAILY LIVING (ADL)
ADL_SCORE: 12
HISTORY OF FALLING IN THE LAST YEAR (PRIOR TO ADMISSION): YES
MOBILITY_ASSIST_DEVICES: FRONT-WHEELED WALKER;WHEELCHAIR
ADL_BEFORE_ADMISSION: INDEPENDENT

## 2022-10-21 LAB
ASR DISCLAIMER: NORMAL
CASE RPRT: NORMAL
CLINICAL INFO: NORMAL
PATH REPORT.FINAL DX SPEC: NORMAL
PATH REPORT.GROSS SPEC: NORMAL

## 2023-02-23 ENCOUNTER — HOSPITAL ENCOUNTER (OUTPATIENT)
Dept: INTERVENTIONAL RADIOLOGY/VASCULAR | Facility: HOSPITAL | Age: 83
Discharge: HOME | End: 2023-02-23
Attending: INTERNAL MEDICINE
Payer: MEDICARE

## 2023-03-10 NOTE — H&P
Patient seen and examined independently. H and P dated 3/9/23 reviewed. No changes in H and P. Risks and benefits of procedure were discussed with patient. Airway examined. Patient is ASA class 2 and Mallampati class 2. Pt is appropriate for conscious sedation. No history of difficult airway. The risks, benefits, indications and alternatives of left heart catheterization and coronary angigoraphy with possible percutaneous coronary intervention were discussed. The risks include, but are not limited to: bleeding, anemia requiring blood transfusion, allergic reaction, hypotension, infection, vascular injury, injury to upper or lower extremity requiring endovascular or open surgical repair,  kidney failure, stroke, cardiac or pulmonary artery perforation, pericardial effusion and tamponade requiring pericardiocentesis, myocardial infarction (heart attack), respiratory failure needing intubation, cardiac arrest, need for emergent surgery, and death. Benefits of the procedure include: symptomatic improvement, diagnosis of coronary artery disease or other forms of heart disease and possibly prevention of myocardial infarction. Alternatives to the procedure include: not performing cardiac catheterization, treatment with medications only, and observation. The patient and any accompanying family have considered the above, all questions have been answered to the best of my ability to their satisfaction, and have decided to proceed with the procedure. Appropriate candidate for Sedation/Analgesia: Yes  Plan for Sedation reviewed: Yes    Explained Anesthesia options and attendant risks, and have determined patient is an appropriate candidate.  Yes  Consent for Sedation obtained: Yes  Patient reevaluated immediately prior to Sedation/Analgesia: Yes

## 2023-03-16 RX ORDER — ALLOPURINOL 300 MG/1
300 TABLET ORAL DAILY
COMMUNITY

## 2023-03-21 ENCOUNTER — HOSPITAL ENCOUNTER (OUTPATIENT)
Dept: INTERVENTIONAL RADIOLOGY/VASCULAR | Facility: HOSPITAL | Age: 83
Discharge: HOME | End: 2023-03-21
Attending: INTERNAL MEDICINE | Admitting: INTERNAL MEDICINE
Payer: MEDICARE

## 2023-03-21 VITALS
HEIGHT: 62 IN | RESPIRATION RATE: 14 BRPM | OXYGEN SATURATION: 100 % | WEIGHT: 177 LBS | TEMPERATURE: 96 F | BODY MASS INDEX: 32.57 KG/M2 | SYSTOLIC BLOOD PRESSURE: 112 MMHG | DIASTOLIC BLOOD PRESSURE: 49 MMHG | HEART RATE: 54 BPM

## 2023-03-21 DIAGNOSIS — R94.39 ABNORMAL STRESS TEST: ICD-10-CM

## 2023-03-21 LAB — GLUCOSE BLD-MCNC: 104 MG/DL (ref 70–99)

## 2023-03-21 PROCEDURE — 4A023N7 MEASUREMENT OF CARDIAC SAMPLING AND PRESSURE, LEFT HEART, PERCUTANEOUS APPROACH: ICD-10-PCS | Performed by: INTERNAL MEDICINE

## 2023-03-21 PROCEDURE — 99152 MOD SED SAME PHYS/QHP 5/>YRS: CPT | Performed by: INTERNAL MEDICINE

## 2023-03-21 PROCEDURE — 93458 L HRT ARTERY/VENTRICLE ANGIO: CPT | Performed by: INTERNAL MEDICINE

## 2023-03-21 PROCEDURE — 82962 GLUCOSE BLOOD TEST: CPT

## 2023-03-21 PROCEDURE — B211YZZ FLUOROSCOPY OF MULTIPLE CORONARY ARTERIES USING OTHER CONTRAST: ICD-10-PCS | Performed by: INTERNAL MEDICINE

## 2023-03-21 RX ORDER — SODIUM CHLORIDE 9 MG/ML
INJECTION, SOLUTION INTRAVENOUS
Status: COMPLETED | OUTPATIENT
Start: 2023-03-22 | End: 2023-03-21

## 2023-03-21 RX ORDER — IODIXANOL 320 MG/ML
100 INJECTION, SOLUTION INTRAVASCULAR
Status: COMPLETED | OUTPATIENT
Start: 2023-03-21 | End: 2023-03-21

## 2023-03-21 RX ORDER — VERAPAMIL HYDROCHLORIDE 2.5 MG/ML
INJECTION, SOLUTION INTRAVENOUS
Status: COMPLETED
Start: 2023-03-21 | End: 2023-03-21

## 2023-03-21 RX ORDER — NITROGLYCERIN 20 MG/100ML
INJECTION INTRAVENOUS
Status: COMPLETED
Start: 2023-03-21 | End: 2023-03-21

## 2023-03-21 RX ORDER — MIDAZOLAM HYDROCHLORIDE 1 MG/ML
INJECTION INTRAMUSCULAR; INTRAVENOUS
Status: COMPLETED
Start: 2023-03-21 | End: 2023-03-21

## 2023-03-21 RX ORDER — HEPARIN SODIUM 5000 [USP'U]/ML
INJECTION, SOLUTION INTRAVENOUS; SUBCUTANEOUS
Status: COMPLETED
Start: 2023-03-21 | End: 2023-03-21

## 2023-03-21 RX ORDER — LIDOCAINE HYDROCHLORIDE 10 MG/ML
INJECTION, SOLUTION EPIDURAL; INFILTRATION; INTRACAUDAL; PERINEURAL
Status: COMPLETED
Start: 2023-03-21 | End: 2023-03-21

## 2023-03-21 RX ADMIN — SODIUM CHLORIDE: 9 INJECTION, SOLUTION INTRAVENOUS at 09:47:00

## 2023-03-21 RX ADMIN — IODIXANOL 35 ML: 320 INJECTION, SOLUTION INTRAVASCULAR at 10:37:00

## 2023-03-21 NOTE — PROCEDURES
OPERATIVE REPORT - CARDIAC CATHETERIZATION    Date of Procedure: 3/21/2023     Performing Physician: Luis Enrique Clarke. Theodora Li MD    Referring Physician: Jaden Willams MD    Pre-Procedure Diagnosis:   1. Chest pain   2. Abnormal stress MPI suggests ischemic heart disease  3. Chronic edema - on torsemide, per renal  4. CKD 4 - s/p nephrectomy, b/l Cr ~ 2.5  5. HL   6. HTN  7. DM2    Post-Procedure Diagnosis:  1. Same as pre    Findings:  1. Left main: No stenosis  2. LAD: Prox 75%. Mid short segment intramyocardial with mild systolic compression. 3. Left circumflex: Gives off high OM1 with moderate 60% diffuse plaque. Then  prox LCX. 4. RCA: Dominant. Prox diffuse 80-90% stenosis. PDA and RPL luminal irregularities only. Distal RPL supplies grade 3 collaterals to LCX backfilling to a proximal OM. 5. Hemodynamics: LVEDP 10 mmHg. No aortic stenosis on pullback. Conclusions / Recommendations:  1. 3 vessel CAD involving proximal LAD in a patient with diabetes. Recommend CTS evaluation for CABG. 2. Remove the TR band per protocol  3. IV hydration (received 250 NS prior, will receive 500 NS post)  4. Update echo  5. CT surgery evaluation for CABG     --------------    Procedures performed:   1. Left heart catheterization  2. Selective bilateral coronary angiography  3. Moderate sedation  4. Access of right radial artery    Indications: This is a [de-identified]year old male presenting with chest discomfort and abnormal stress test. Referred for left heart catheterization with coronary angiography to evaluate for obstructive CAD. Description of Procedure: Informed consent was obtained from the patient in writing. The risks and benefits of the procedure were reviewed in detail with the patient, including but not limited to the risk of myocardial infarction, stroke, renal failure, bleeding, and death.  After a thorough discussion of these risks and benefits, the patient agreed to proceed and signed an informed consent document. The patient was brought to the cardiac catheterization laboratory in the fasting state. Moderate sedation was employed using a total of IV Versed 3 mg and IV fentanyl 75 mcg in divided doses. I directly observed the patient from [id number] to 99 [phone], and an independent trained observer was present and assisted in the monitoring of the patient's level of consciousness and physiological status, heart rate, blood pressure, oximetry, and rhythm. All operators present for the case performed standard pre-procedural prep including hand washing, sterile gloves, gown, mask, and cap. All aspects of the maximum sterile barrier technique were followed. A preprocedural time out was performed with all physicians, technologists, and nurses involved with the procedure. 2% lidocaine was infiltrated subcutaneously in the right wrist for local anesthesia. Access was obtained in the right radial artery with a 5/6F Slender Glidesheath using the Seldinger technique and a micropuncture needle. Standard vasodilator cocktail was given with heparin 2500 units, nitroglycerin 200 mcg and verapamil 2.5 mg through the arterial sheath. A 6F TIG catheter was advanced over a J tipped wire through the arterial sheath and placed in the aortic root, then used to selectively engage the right coronary artery. Standard angiographic views were taken in orthogonal projections. The catheter was then prolapsed into the LV over a wire and placed at the base of the LV. LV systolic and end diastolic pressure was then recorded. The catheter was pulled back and pullback measurements of LV and aortic pressure and recorded. The left main was not a good fit with the TIG so we exchanged over a wire for a 6F JL 3.5 diagnostic and engaged the left main and standard angiographic views were performed. The catheter was then removed over a wire. At the conclusion of the procedure, all catheters and wires were removed over a wire.  The arterial sheath was removed and hemostasis achieved with standard TR band using patent hemostasis technique. There was no bleeding or hematoma. The patient tolerated the procedure well without complication. EBL <10 mL. Total contrast ~ 35 mL. See procedure log for fluoro time and radiation dose. Leonard Abdul MD  Interventional Cardiology  [facility]

## 2023-03-21 NOTE — PROGRESS NOTES
0900- Pt here for [facility]. Pt with language barrier. Pt's son at bedside to help translate. 18 - Pt s/p LHC via R radial artery with TR Band placed post procedure-air released per protocol throughout recovery period with no bleeding or hematoma noted. Coban dressing applied once TR band removed and arm board placed to site. VSS. Pt denies pain or numbness/tingling to RUE. Pt ambulated to bathroom with walker. IV d/c'd. Discharge instructions given to pts daughter and Quyenl Meo as a language barrier is in place-they verbalized understanding. Pt to lobby via WC and Grandson to drive home. Appointment set for Pt to see Dr Diamond Gilbert for consult.

## 2023-05-02 ENCOUNTER — LAB ENCOUNTER (OUTPATIENT)
Dept: LAB | Age: 83
End: 2023-05-02
Attending: INTERNAL MEDICINE
Payer: MEDICARE

## 2023-05-02 DIAGNOSIS — I13.0 HYPERTENSIVE HEART AND RENAL DISEASE WITH CONGESTIVE HEART FAILURE (HCC): ICD-10-CM

## 2023-05-02 DIAGNOSIS — N25.81 SECONDARY HYPERPARATHYROIDISM OF RENAL ORIGIN (HCC): ICD-10-CM

## 2023-05-02 DIAGNOSIS — N18.4 CHRONIC KIDNEY DISEASE, STAGE IV (SEVERE) (HCC): Primary | ICD-10-CM

## 2023-05-02 DIAGNOSIS — N18.9 ANEMIA OF CHRONIC RENAL FAILURE: ICD-10-CM

## 2023-05-02 DIAGNOSIS — D63.1 ANEMIA OF CHRONIC RENAL FAILURE: ICD-10-CM

## 2023-05-02 DIAGNOSIS — E11.21 DIABETIC GLOMERULOPATHY (HCC): ICD-10-CM

## 2023-05-02 LAB
ANION GAP SERPL CALC-SCNC: 7 MMOL/L (ref 0–18)
BUN BLD-MCNC: 53 MG/DL (ref 7–18)
CALCIUM BLD-MCNC: 8.9 MG/DL (ref 8.5–10.1)
CHLORIDE SERPL-SCNC: 101 MMOL/L (ref 98–112)
CO2 SERPL-SCNC: 27 MMOL/L (ref 21–32)
CREAT BLD-MCNC: 2.53 MG/DL
ERYTHROCYTE [DISTWIDTH] IN BLOOD BY AUTOMATED COUNT: 15.9 %
FASTING STATUS PATIENT QL REPORTED: NO
GFR SERPLBLD BASED ON 1.73 SQ M-ARVRAT: 25 ML/MIN/1.73M2 (ref 60–?)
GLUCOSE BLD-MCNC: 238 MG/DL (ref 70–99)
HCT VFR BLD AUTO: 34.4 %
HGB BLD-MCNC: 11 G/DL
MCH RBC QN AUTO: 27.8 PG (ref 26–34)
MCHC RBC AUTO-ENTMCNC: 32 G/DL (ref 31–37)
MCV RBC AUTO: 87.1 FL
OSMOLALITY SERPL CALC.SUM OF ELEC: 302 MOSM/KG (ref 275–295)
PHOSPHATE SERPL-MCNC: 3.4 MG/DL (ref 2.5–4.9)
PLATELET # BLD AUTO: 309 10(3)UL (ref 150–450)
POTASSIUM SERPL-SCNC: 3.9 MMOL/L (ref 3.5–5.1)
PTH-INTACT SERPL-MCNC: 121.3 PG/ML (ref 18.5–88)
RBC # BLD AUTO: 3.95 X10(6)UL
SODIUM SERPL-SCNC: 135 MMOL/L (ref 136–145)
VIT D+METAB SERPL-MCNC: 36.3 NG/ML (ref 30–100)
WBC # BLD AUTO: 10.9 X10(3) UL (ref 4–11)

## 2023-05-02 PROCEDURE — 82043 UR ALBUMIN QUANTITATIVE: CPT

## 2023-05-02 PROCEDURE — 84100 ASSAY OF PHOSPHORUS: CPT

## 2023-05-02 PROCEDURE — 36415 COLL VENOUS BLD VENIPUNCTURE: CPT

## 2023-05-02 PROCEDURE — 81003 URINALYSIS AUTO W/O SCOPE: CPT

## 2023-05-02 PROCEDURE — 80048 BASIC METABOLIC PNL TOTAL CA: CPT

## 2023-05-02 PROCEDURE — 85027 COMPLETE CBC AUTOMATED: CPT

## 2023-05-02 PROCEDURE — 82306 VITAMIN D 25 HYDROXY: CPT

## 2023-05-02 PROCEDURE — 83970 ASSAY OF PARATHORMONE: CPT

## 2023-05-02 PROCEDURE — 82570 ASSAY OF URINE CREATININE: CPT

## 2023-05-03 LAB
BILIRUB UR QL STRIP.AUTO: NEGATIVE
CLARITY UR REFRACT.AUTO: CLEAR
CREAT UR-SCNC: 44.8 MG/DL
GLUCOSE UR STRIP.AUTO-MCNC: >=500 MG/DL
KETONES UR STRIP.AUTO-MCNC: NEGATIVE MG/DL
LEUKOCYTE ESTERASE UR QL STRIP.AUTO: NEGATIVE
MICROALBUMIN UR-MCNC: 2.57 MG/DL
MICROALBUMIN/CREAT 24H UR-RTO: 57.4 UG/MG (ref ?–30)
NITRITE UR QL STRIP.AUTO: NEGATIVE
PH UR STRIP.AUTO: 6 [PH] (ref 5–8)
PROT UR STRIP.AUTO-MCNC: NEGATIVE MG/DL
RBC UR QL AUTO: NEGATIVE
SP GR UR STRIP.AUTO: 1.01 (ref 1–1.03)
UROBILINOGEN UR STRIP.AUTO-MCNC: <2 MG/DL

## 2024-04-02 ENCOUNTER — APPOINTMENT (OUTPATIENT)
Dept: GENERAL RADIOLOGY | Facility: HOSPITAL | Age: 84
End: 2024-04-02
Payer: MEDICARE

## 2024-04-02 ENCOUNTER — HOSPITAL ENCOUNTER (EMERGENCY)
Facility: HOSPITAL | Age: 84
Discharge: LEFT BEFORE DISCHARGE | End: 2024-04-02
Attending: EMERGENCY MEDICINE
Payer: MEDICARE

## 2024-04-02 VITALS
SYSTOLIC BLOOD PRESSURE: 131 MMHG | BODY MASS INDEX: 29.99 KG/M2 | DIASTOLIC BLOOD PRESSURE: 58 MMHG | HEART RATE: 71 BPM | HEIGHT: 65 IN | WEIGHT: 180 LBS | OXYGEN SATURATION: 100 % | TEMPERATURE: 98 F | RESPIRATION RATE: 15 BRPM

## 2024-04-02 DIAGNOSIS — K21.00 GASTROESOPHAGEAL REFLUX DISEASE WITH ESOPHAGITIS, UNSPECIFIED WHETHER HEMORRHAGE: ICD-10-CM

## 2024-04-02 DIAGNOSIS — R07.9 CHEST PAIN OF UNCERTAIN ETIOLOGY: Primary | ICD-10-CM

## 2024-04-02 PROBLEM — E87.1 HYPONATREMIA: Status: ACTIVE | Noted: 2024-04-02

## 2024-04-02 PROBLEM — E87.20 METABOLIC ACIDOSIS: Status: ACTIVE | Noted: 2024-04-02

## 2024-04-02 PROBLEM — R79.89 AZOTEMIA: Status: ACTIVE | Noted: 2024-04-02

## 2024-04-02 PROBLEM — D64.9 ANEMIA: Status: ACTIVE | Noted: 2024-04-02

## 2024-04-02 PROBLEM — R73.9 HYPERGLYCEMIA: Status: ACTIVE | Noted: 2024-04-02

## 2024-04-02 LAB
ALBUMIN SERPL-MCNC: 3.5 G/DL (ref 3.4–5)
ALBUMIN/GLOB SERPL: 0.9 {RATIO} (ref 1–2)
ALP LIVER SERPL-CCNC: 116 U/L
ALT SERPL-CCNC: 27 U/L
ANION GAP SERPL CALC-SCNC: 11 MMOL/L (ref 0–18)
AST SERPL-CCNC: 24 U/L (ref 15–37)
BASOPHILS # BLD AUTO: 0.05 X10(3) UL (ref 0–0.2)
BASOPHILS NFR BLD AUTO: 0.6 %
BILIRUB SERPL-MCNC: 0.6 MG/DL (ref 0.1–2)
BUN BLD-MCNC: 82 MG/DL (ref 9–23)
CALCIUM BLD-MCNC: 9.5 MG/DL (ref 8.5–10.1)
CHLORIDE SERPL-SCNC: 100 MMOL/L (ref 98–112)
CO2 SERPL-SCNC: 21 MMOL/L (ref 21–32)
CREAT BLD-MCNC: 3.13 MG/DL
EGFRCR SERPLBLD CKD-EPI 2021: 19 ML/MIN/1.73M2 (ref 60–?)
EOSINOPHIL # BLD AUTO: 0.46 X10(3) UL (ref 0–0.7)
EOSINOPHIL NFR BLD AUTO: 5.1 %
ERYTHROCYTE [DISTWIDTH] IN BLOOD BY AUTOMATED COUNT: 15.6 %
GLOBULIN PLAS-MCNC: 4 G/DL (ref 2.8–4.4)
GLUCOSE BLD-MCNC: 134 MG/DL (ref 70–99)
HCT VFR BLD AUTO: 26 %
HGB BLD-MCNC: 9.2 G/DL
IMM GRANULOCYTES # BLD AUTO: 0.03 X10(3) UL (ref 0–1)
IMM GRANULOCYTES NFR BLD: 0.3 %
LYMPHOCYTES # BLD AUTO: 3.54 X10(3) UL (ref 1–4)
LYMPHOCYTES NFR BLD AUTO: 39.2 %
MCH RBC QN AUTO: 31.2 PG (ref 26–34)
MCHC RBC AUTO-ENTMCNC: 35.4 G/DL (ref 31–37)
MCV RBC AUTO: 88.1 FL
MONOCYTES # BLD AUTO: 0.91 X10(3) UL (ref 0.1–1)
MONOCYTES NFR BLD AUTO: 10.1 %
NEUTROPHILS # BLD AUTO: 4.05 X10 (3) UL (ref 1.5–7.7)
NEUTROPHILS # BLD AUTO: 4.05 X10(3) UL (ref 1.5–7.7)
NEUTROPHILS NFR BLD AUTO: 44.7 %
NT-PROBNP SERPL-MCNC: 627 PG/ML (ref ?–450)
OSMOLALITY SERPL CALC.SUM OF ELEC: 301 MOSM/KG (ref 275–295)
PLATELET # BLD AUTO: 187 10(3)UL (ref 150–450)
POTASSIUM SERPL-SCNC: 3.7 MMOL/L (ref 3.5–5.1)
PROT SERPL-MCNC: 7.5 G/DL (ref 6.4–8.2)
RBC # BLD AUTO: 2.95 X10(6)UL
SODIUM SERPL-SCNC: 132 MMOL/L (ref 136–145)
TROPONIN I SERPL HS-MCNC: 38 NG/L
TROPONIN I SERPL HS-MCNC: 62 NG/L
WBC # BLD AUTO: 9 X10(3) UL (ref 4–11)

## 2024-04-02 PROCEDURE — 85025 COMPLETE CBC W/AUTO DIFF WBC: CPT | Performed by: EMERGENCY MEDICINE

## 2024-04-02 PROCEDURE — 93005 ELECTROCARDIOGRAM TRACING: CPT

## 2024-04-02 PROCEDURE — 71045 X-RAY EXAM CHEST 1 VIEW: CPT

## 2024-04-02 PROCEDURE — 93010 ELECTROCARDIOGRAM REPORT: CPT

## 2024-04-02 PROCEDURE — 84484 ASSAY OF TROPONIN QUANT: CPT

## 2024-04-02 PROCEDURE — 96374 THER/PROPH/DIAG INJ IV PUSH: CPT

## 2024-04-02 PROCEDURE — 99285 EMERGENCY DEPT VISIT HI MDM: CPT

## 2024-04-02 PROCEDURE — S0028 INJECTION, FAMOTIDINE, 20 MG: HCPCS | Performed by: EMERGENCY MEDICINE

## 2024-04-02 PROCEDURE — 80053 COMPREHEN METABOLIC PANEL: CPT | Performed by: EMERGENCY MEDICINE

## 2024-04-02 PROCEDURE — 84484 ASSAY OF TROPONIN QUANT: CPT | Performed by: EMERGENCY MEDICINE

## 2024-04-02 PROCEDURE — 85025 COMPLETE CBC W/AUTO DIFF WBC: CPT

## 2024-04-02 PROCEDURE — 83880 ASSAY OF NATRIURETIC PEPTIDE: CPT | Performed by: EMERGENCY MEDICINE

## 2024-04-02 PROCEDURE — 80053 COMPREHEN METABOLIC PANEL: CPT

## 2024-04-02 RX ORDER — FAMOTIDINE 10 MG/ML
20 INJECTION, SOLUTION INTRAVENOUS ONCE
Status: COMPLETED | OUTPATIENT
Start: 2024-04-02 | End: 2024-04-02

## 2024-04-02 RX ORDER — CLOPIDOGREL BISULFATE 75 MG/1
75 TABLET ORAL DAILY
COMMUNITY

## 2024-04-02 RX ORDER — SUCRALFATE 1 G/1
1 TABLET ORAL
Qty: 20 TABLET | Refills: 0 | Status: SHIPPED | OUTPATIENT
Start: 2024-04-02 | End: 2024-04-07

## 2024-04-02 RX ORDER — SPIRONOLACTONE 25 MG/1
25 TABLET ORAL DAILY
COMMUNITY

## 2024-04-02 RX ORDER — PANTOPRAZOLE SODIUM 20 MG/1
20 TABLET, DELAYED RELEASE ORAL 2 TIMES DAILY
Qty: 60 TABLET | Refills: 0 | Status: SHIPPED | OUTPATIENT
Start: 2024-04-02 | End: 2024-05-02

## 2024-04-02 RX ORDER — ONDANSETRON 2 MG/ML
4 INJECTION INTRAMUSCULAR; INTRAVENOUS EVERY 4 HOURS PRN
OUTPATIENT
Start: 2024-04-02 | End: 2024-04-02

## 2024-04-02 RX ORDER — TAMSULOSIN HYDROCHLORIDE 0.4 MG/1
0.4 CAPSULE ORAL
COMMUNITY

## 2024-04-02 RX ORDER — ATORVASTATIN CALCIUM 80 MG/1
80 TABLET, FILM COATED ORAL NIGHTLY
COMMUNITY

## 2024-04-02 RX ORDER — TORSEMIDE 20 MG/1
20 TABLET ORAL DAILY
COMMUNITY

## 2024-04-02 RX ORDER — LOSARTAN POTASSIUM 25 MG/1
25 TABLET ORAL DAILY
COMMUNITY

## 2024-04-02 RX ORDER — ASPIRIN 81 MG/1
81 TABLET, CHEWABLE ORAL ONCE
Status: COMPLETED | OUTPATIENT
Start: 2024-04-02 | End: 2024-04-02

## 2024-04-02 NOTE — ED INITIAL ASSESSMENT (HPI)
Patient here for chest pressure/burning at a 5/10.  That has been going on for a wekk now. Some dizzy ness, no SOB or blurred vison

## 2024-04-03 LAB
ATRIAL RATE: 94 BPM
P AXIS: 52 DEGREES
P-R INTERVAL: 142 MS
Q-T INTERVAL: 334 MS
QRS DURATION: 84 MS
QTC CALCULATION (BEZET): 417 MS
R AXIS: 26 DEGREES
T AXIS: 61 DEGREES
VENTRICULAR RATE: 94 BPM

## 2024-04-03 NOTE — ED PROVIDER NOTES
Patient Seen in: [facility] Emergency Department      History     Chief Complaint   Patient presents with    Chest Pain Angina    Difficulty Breathing     Stated Complaint: Chest pressure and heavyness, SOB, neck and jaw pain, 90/60s HX of diabetesx CAD    Subjective:   HPI    This is a 83-year-old male who arrives here with complaints of what he describes as chest pressure, burning since earlier today.  He has had some of this going on for 1 week but is gotten worse.  He states that he also felt a little dizzy with it.  No shortness of breath or blurred vision.  The patient described as more burning and chest discomfort.  Patient does have history coronary disease which is being watched because of his significant renal insufficiency felt that he only has 1 kidney and that is not functioning well.  The family stated that the patient is continues to have this discomfort.  It is worse at night.  He had no fever no chills.  He has chronic leg edema.        Objective:   No pertinent past medical history.            No pertinent past surgical history.              No pertinent social history.            Review of Systems    Positive for stated complaint: Chest pressure and heavyness, SOB, neck and jaw pain, 90/60s HX of diabetesx CAD  Other systems are as noted in HPI.  Constitutional and vital signs reviewed.      All other systems reviewed and negative except as noted above.    Physical Exam     ED Triage Vitals   BP 04/02/24 1844 153/53   Pulse 04/02/24 1844 85   Resp 04/02/24 1844 18   Temp 04/02/24 1855 98 °F (36.7 °C)   Temp src 04/02/24 1855 Temporal   SpO2 04/02/24 1844 100 %   O2 Device 04/02/24 1844 None (Room air)       Current:/58   Pulse 71   Temp 98 °F (36.7 °C) (Temporal)   Resp 15   Ht 165.1 cm (5' 5\")   Wt 81.6 kg   SpO2 100%   BMI 29.95 kg/m²         Physical Exam    General: .  Patient is a pleasant older male.  The patient is in no respiratory distress    HEENT: Atraumatic,  conjunctiva are not pale.  There is no icterus.  Oral mucosa Is wet.  No facial trauma.  The neck is supple.    LUNGS: Coarse breath sounds at the bases.  Bilateral pitting edema noted.    CV: Cardiovascular is regular without murmurs or rubs.    ABD: The abdomen is soft nondistended nontender.  There is no rebound.  There is no guarding.    EXT: There is good pulses bilaterally.  There is no calf tenderness.  There is no rash noted.  There is     NEURO: Alert and oriented x4.  Muscle strength and sensory exam is grossly normal.  And the patient is neurologically intact with no focal findings.      ED Course     Labs Reviewed   COMP METABOLIC PANEL (14) - Abnormal; Notable for the following components:       Result Value    Glucose 134 (*)     Sodium 132 (*)     BUN 82 (*)     Creatinine 3.13 (*)     Calculated Osmolality 301 (*)     eGFR-Cr 19 (*)     A/G Ratio 0.9 (*)     All other components within normal limits   PRO BETA NATRIURETIC PEPTIDE - Abnormal; Notable for the following components:    Pro-Beta Natriuretic Peptide 627 (*)     All other components within normal limits   CBC W/ DIFFERENTIAL - Abnormal; Notable for the following components:    RBC 2.95 (*)     HGB 9.2 (*)     HCT 26.0 (*)     All other components within normal limits   TROPONIN I HIGH SENSITIVITY - Normal   TROPONIN I HIGH SENSITIVITY - Normal   CBC WITH DIFFERENTIAL WITH PLATELET    Narrative:     The following orders were created for panel order CBC With Differential With Platelet.  Procedure                               Abnormality         Status                     ---------                               -----------         ------                     CBC W/ DIFFERENTIAL[[id number]]          Abnormal            Final result                 Please view results for these tests on the individual orders.   RAINBOW DRAW LAVENDER   RAINBOW DRAW LIGHT GREEN   RAINBOW DRAW BLUE   RAINBOW DRAW GOLD            The patient was placed on monitors,  IV was started, blood was drawn.     Workup was done to rule out an acute coronary syndrome, electrolyte imbalance, heart failure    MDM      The EKG shows normal sinus rhythm.  There is no acute ST elevations there is ST depressions in the lateral leads the rest of the EKG including rate rhythm axis and intervals I agree with the EKG report . The rate is 94      The patient was placed on the wrong EH number therefore the patient's workup will need to be merged.  I did review old records which showed  1. Left main: No stenosis  2. LAD: Prox 75%. Mid short segment intramyocardial with mild systolic compression.   3. Left circumflex: Gives off high OM1 with moderate 60% diffuse plaque. Then  prox LCX.   4. RCA: Dominant. Prox diffuse 80-90% stenosis. PDA and RPL luminal irregularities only. Distal RPL supplies grade 3 collaterals to LCX backfilling to a proximal OM.   5. Hemodynamics: LVEDP 10 mmHg. No aortic stenosis on pullback.      Conclusions / Recommendations:  1. 3 vessel CAD involving proximal LAD in a patient with diabetes. Recommend CTS evaluation for CABG.   2. Remove the TR band per protocol  3. IV hydration (received 250 NS prior, will receive 500 NS post)  4. Update echo  5. CT surgery evaluation for CABG         The patient is already a been seen to cardiovascular surgery.  From an anatomic standpoint looking at the angiogram this is straightforward clear indication for surgery.  That said, I think based on (1) his age; (2) general frailty; (3) sedentary lifestyle; and (4) most importantly his kidney function.  I think bypass surgery in this gentleman is not a great idea.  We certainly couldn’t make him better than he is right now with bypass surgery and I think we could hurt him significantly with either delayed recovery or nonrecovery including the potential for dialysis.       I have explained this to the patient and family.  My recommendation would be not in favor of surgical treatment.  But, as  always, I encourage people to get other opinions.  Someone else might have a different idea thinking that surgery would be a great idea.  I just wouldn’t share that opinion.  I have explained this to the patient and his family.  I think they all understand, especially and including the patient.       Thank you for allowing us to see this patient in consultation.            Patient did get a troponin x 2 that was negative.    Findings of some chronic renal sufficiency creatinine of 3.13 family says is normal for him.  The patient does have anemia which he is was noted to have previously on old records.  He does have chronic renal insufficiency.  Which was noted on previous records..  I talked to other family members on the phone for the .  I talked to the family members that who care for him his daughter she states that he has a history of renal insufficiency do see the urologist.  They do see a cardiologist and they see the primary care physician and his creatinine runs in the 3 his hemoglobin runs somewhere between 9 8 and 9.  And the patient's family states that he has had all of his workup at another facility and [facility].  I discussed this still would be safer for him to stay in the hospital his BNP was been high before on the previous workup I do see that his hemoglobin is to run between 9 and 10 old records but no recent records in the family system all the other records are from [facility].  I discussed that he should stay in the hospital he they adamantly refused the understanding that if he goes home that can cause death or disability had actually been talk to somebody on the phone who is a family member who is who is medical they the patient's family daughter and other family numbers refused for him to be admitted I discussed at least minimal I would recommend repeat troponin which was ordered.  It was negative.  The family will go home I discussed this case with Dr. Westfall the patient will sign out AMA  but I discussed any point the patient can return here for increasing chest pain or shortness of breath.  They have understand there is a risk of going home including worst-case death or disability.    But I discussed he can always return here but I recommend close follow-up with cardiology nephrology and and his primary care physician           Medical Decision Making      Disposition and Plan     Clinical Impression:  1. Chest pain of uncertain etiology    2. Gastroesophageal reflux disease with esophagitis, unspecified whether hemorrhage         Disposition:  [place]  4/2/2024  9:25 pm    Follow-up:  Hiram Nugent MD  [address]  [place] [id number] [phone]    Follow up      Phillip Schwab MD  [address]  [facility] [address]  [phone]    Follow up      Cornell Bryson MD  [address]  [facility] [id number] [phone]    Follow up in 2 day(s)            Medications Prescribed:  Discharge Medication List as of 4/2/2024  9:30 PM        START taking these medications    Details   pantoprazole 20 MG Oral Tab EC Take 1 tablet (20 mg total) by mouth 2 (two) times daily., Normal, Disp-60 tablet, R-0      sucralfate 1 g Oral Tab Take 1 tablet (1 g total) by mouth 4 (four) times daily before meals and nightly for 20 doses., Normal, Disp-20 tablet, R-0

## 2024-04-03 NOTE — ED QUICK NOTES
Orders for admission, patient is aware of plan and ready to go upstairs. Any questions, please call ED RN Stefano at extension [phone].     Patient Covid vaccination status: Unvaccinated     COVID Test Ordered in ED: None    COVID Suspicion at Admission: N/A    Running Infusions:  None    Mental Status/LOC at time of transport: Alert and oriented x 4 . Needs . Daughter at bedside     Other pertinent information:   CIWA score: N/A   NIH score:  N/A

## 2024-04-03 NOTE — DISCHARGE INSTRUCTIONS
Take Maalox over-the-counter along with Carafate, Protonix.  Follow-up with both your primary care physician, GI specialist and cardiologist.  Return if increasing pain discomfort    You were seen in the emergency room in a limited time.  There is a possibility that although we do not see any acute process at this present time that things can change with time.  Is therefore imperative that you follow-up with primary care physician for close follow-up.  If there is any significant progression of your pain  or other symptoms you to return immediately to the emergency room.

## 2024-04-18 ENCOUNTER — ORDERS FOR ADMISSION (OUTPATIENT)
Dept: CARDIOLOGY CLINIC | Facility: HOSPITAL | Age: 84
End: 2024-04-18

## 2024-04-18 DIAGNOSIS — I25.118 CORONARY ARTERY DISEASE OF NATIVE ARTERY OF NATIVE HEART WITH STABLE ANGINA PECTORIS (HCC): Primary | ICD-10-CM

## 2024-04-23 ENCOUNTER — HOSPITAL ENCOUNTER (INPATIENT)
Facility: HOSPITAL | Age: 84
LOS: 3 days | Discharge: HOME HEALTH | End: 2024-04-26
Attending: HOSPITALIST | Admitting: HOSPITALIST
Payer: MEDICARE

## 2024-04-23 PROBLEM — N18.4 CKD (CHRONIC KIDNEY DISEASE) STAGE 4, GFR 15-29 ML/MIN (HCC): Status: ACTIVE | Noted: 2024-04-23

## 2024-04-23 PROBLEM — I25.119 CORONARY ARTERY DISEASE INVOLVING NATIVE HEART WITH ANGINA PECTORIS (HCC): Status: ACTIVE | Noted: 2024-04-23

## 2024-04-23 LAB
ANION GAP SERPL CALC-SCNC: 7 MMOL/L (ref 0–18)
ANTIBODY SCREEN: NEGATIVE
BASOPHILS # BLD AUTO: 0.04 X10(3) UL (ref 0–0.2)
BASOPHILS NFR BLD AUTO: 0.5 %
BUN BLD-MCNC: 63 MG/DL (ref 9–23)
CALCIUM BLD-MCNC: 8.9 MG/DL (ref 8.5–10.1)
CHLORIDE SERPL-SCNC: 96 MMOL/L (ref 98–112)
CO2 SERPL-SCNC: 26 MMOL/L (ref 21–32)
CREAT BLD-MCNC: 2.92 MG/DL
DEPRECATED HBV CORE AB SER IA-ACNC: 94.1 NG/ML
EGFRCR SERPLBLD CKD-EPI 2021: 21 ML/MIN/1.73M2 (ref 60–?)
EOSINOPHIL # BLD AUTO: 0.31 X10(3) UL (ref 0–0.7)
EOSINOPHIL NFR BLD AUTO: 3.8 %
ERYTHROCYTE [DISTWIDTH] IN BLOOD BY AUTOMATED COUNT: 15.4 %
GLUCOSE BLD-MCNC: 108 MG/DL (ref 70–99)
GLUCOSE BLD-MCNC: 147 MG/DL (ref 70–99)
GLUCOSE BLD-MCNC: 175 MG/DL (ref 70–99)
GLUCOSE BLD-MCNC: 97 MG/DL (ref 70–99)
HCT VFR BLD AUTO: 23.2 %
HGB BLD-MCNC: 7.7 G/DL
HGB BLD-MCNC: 8.4 G/DL
HGB RETIC QN AUTO: 34.7 PG (ref 28.2–36.6)
IMM GRANULOCYTES # BLD AUTO: 0.02 X10(3) UL (ref 0–1)
IMM GRANULOCYTES NFR BLD: 0.2 %
IMM RETICS NFR: 0.04 RATIO (ref 0.1–0.3)
IRON SATN MFR SERPL: 15 %
IRON SERPL-MCNC: 45 UG/DL
LYMPHOCYTES # BLD AUTO: 2.23 X10(3) UL (ref 1–4)
LYMPHOCYTES NFR BLD AUTO: 27.7 %
MCH RBC QN AUTO: 31.2 PG (ref 26–34)
MCHC RBC AUTO-ENTMCNC: 33.2 G/DL (ref 31–37)
MCV RBC AUTO: 93.9 FL
MONOCYTES # BLD AUTO: 0.92 X10(3) UL (ref 0.1–1)
MONOCYTES NFR BLD AUTO: 11.4 %
NEUTROPHILS # BLD AUTO: 4.54 X10 (3) UL (ref 1.5–7.7)
NEUTROPHILS # BLD AUTO: 4.54 X10(3) UL (ref 1.5–7.7)
NEUTROPHILS NFR BLD AUTO: 56.4 %
OSMOLALITY SERPL CALC.SUM OF ELEC: 289 MOSM/KG (ref 275–295)
PLATELET # BLD AUTO: 208 10(3)UL (ref 150–450)
POTASSIUM SERPL-SCNC: 4.4 MMOL/L (ref 3.5–5.1)
RBC # BLD AUTO: 2.47 X10(6)UL
RETICS # AUTO: 40.5 X10(3) UL (ref 22.5–147.5)
RETICS/RBC NFR AUTO: 1.5 %
RH BLOOD TYPE: POSITIVE
RH BLOOD TYPE: POSITIVE
SODIUM SERPL-SCNC: 129 MMOL/L (ref 136–145)
TIBC SERPL-MCNC: 291 UG/DL (ref 240–450)
TRANSFERRIN SERPL-MCNC: 195 MG/DL (ref 200–360)
WBC # BLD AUTO: 8.1 X10(3) UL (ref 4–11)

## 2024-04-23 PROCEDURE — 83550 IRON BINDING TEST: CPT | Performed by: HOSPITALIST

## 2024-04-23 PROCEDURE — 83540 ASSAY OF IRON: CPT | Performed by: HOSPITALIST

## 2024-04-23 PROCEDURE — 86901 BLOOD TYPING SEROLOGIC RH(D): CPT | Performed by: HOSPITALIST

## 2024-04-23 PROCEDURE — 85045 AUTOMATED RETICULOCYTE COUNT: CPT | Performed by: HOSPITALIST

## 2024-04-23 PROCEDURE — 85025 COMPLETE CBC W/AUTO DIFF WBC: CPT

## 2024-04-23 PROCEDURE — 82728 ASSAY OF FERRITIN: CPT | Performed by: HOSPITALIST

## 2024-04-23 PROCEDURE — 86850 RBC ANTIBODY SCREEN: CPT | Performed by: HOSPITALIST

## 2024-04-23 PROCEDURE — 85018 HEMOGLOBIN: CPT | Performed by: HOSPITALIST

## 2024-04-23 PROCEDURE — 80048 BASIC METABOLIC PNL TOTAL CA: CPT

## 2024-04-23 PROCEDURE — 82962 GLUCOSE BLOOD TEST: CPT

## 2024-04-23 PROCEDURE — 86900 BLOOD TYPING SEROLOGIC ABO: CPT | Performed by: HOSPITALIST

## 2024-04-23 RX ORDER — ASPIRIN 81 MG/1
81 TABLET, CHEWABLE ORAL ONCE
Status: CANCELLED | OUTPATIENT
Start: 2024-04-24

## 2024-04-23 RX ORDER — EZETIMIBE 10 MG/1
10 TABLET ORAL NIGHTLY
COMMUNITY

## 2024-04-23 RX ORDER — PANTOPRAZOLE SODIUM 40 MG/1
40 TABLET, DELAYED RELEASE ORAL
Status: DISCONTINUED | OUTPATIENT
Start: 2024-04-24 | End: 2024-04-24

## 2024-04-23 RX ORDER — TAMSULOSIN HYDROCHLORIDE 0.4 MG/1
0.4 CAPSULE ORAL DAILY
Status: DISCONTINUED | OUTPATIENT
Start: 2024-04-24 | End: 2024-04-26

## 2024-04-23 RX ORDER — ONDANSETRON 2 MG/ML
4 INJECTION INTRAMUSCULAR; INTRAVENOUS EVERY 4 HOURS PRN
Status: ACTIVE | OUTPATIENT
Start: 2024-04-23 | End: 2024-04-23

## 2024-04-23 RX ORDER — NICOTINE POLACRILEX 4 MG
30 LOZENGE BUCCAL
Status: DISCONTINUED | OUTPATIENT
Start: 2024-04-23 | End: 2024-04-26

## 2024-04-23 RX ORDER — SODIUM CHLORIDE 9 MG/ML
INJECTION, SOLUTION INTRAVENOUS CONTINUOUS
Status: ACTIVE | OUTPATIENT
Start: 2024-04-23 | End: 2024-04-24

## 2024-04-23 RX ORDER — SODIUM CHLORIDE 9 MG/ML
INJECTION, SOLUTION INTRAVENOUS CONTINUOUS
Status: DISCONTINUED | OUTPATIENT
Start: 2024-04-23 | End: 2024-04-23

## 2024-04-23 RX ORDER — ASPIRIN 325 MG
325 TABLET, DELAYED RELEASE (ENTERIC COATED) ORAL ONCE
Status: COMPLETED | OUTPATIENT
Start: 2024-04-23 | End: 2024-04-23

## 2024-04-23 RX ORDER — INSULIN GLARGINE 100 [IU]/ML
8 INJECTION, SOLUTION SUBCUTANEOUS NIGHTLY
COMMUNITY

## 2024-04-23 RX ORDER — DEXTROSE MONOHYDRATE 25 G/50ML
50 INJECTION, SOLUTION INTRAVENOUS
Status: DISCONTINUED | OUTPATIENT
Start: 2024-04-23 | End: 2024-04-26

## 2024-04-23 RX ORDER — NICOTINE POLACRILEX 4 MG
15 LOZENGE BUCCAL
Status: DISCONTINUED | OUTPATIENT
Start: 2024-04-23 | End: 2024-04-26

## 2024-04-23 RX ORDER — CLOPIDOGREL BISULFATE 75 MG/1
75 TABLET ORAL DAILY
Status: DISCONTINUED | OUTPATIENT
Start: 2024-04-23 | End: 2024-04-26

## 2024-04-23 RX ORDER — SODIUM CHLORIDE 9 MG/ML
INJECTION, SOLUTION INTRAVENOUS
Status: ACTIVE | OUTPATIENT
Start: 2024-04-24 | End: 2024-04-24

## 2024-04-23 RX ORDER — EZETIMIBE 10 MG/1
10 TABLET ORAL NIGHTLY
Status: DISCONTINUED | OUTPATIENT
Start: 2024-04-23 | End: 2024-04-26

## 2024-04-23 RX ORDER — ASPIRIN 81 MG/1
81 TABLET, CHEWABLE ORAL DAILY
Status: DISCONTINUED | OUTPATIENT
Start: 2024-04-24 | End: 2024-04-26

## 2024-04-23 RX ORDER — ATORVASTATIN CALCIUM 80 MG/1
80 TABLET, FILM COATED ORAL NIGHTLY
Status: DISCONTINUED | OUTPATIENT
Start: 2024-04-23 | End: 2024-04-26

## 2024-04-23 RX ORDER — ACETAMINOPHEN 325 MG/1
650 TABLET ORAL EVERY 6 HOURS PRN
Status: DISCONTINUED | OUTPATIENT
Start: 2024-04-23 | End: 2024-04-26

## 2024-04-23 RX ORDER — CARVEDILOL 6.25 MG/1
6.25 TABLET ORAL DAILY
Status: DISCONTINUED | OUTPATIENT
Start: 2024-04-23 | End: 2024-04-26

## 2024-04-23 NOTE — CONSULTS
[facility] Cardiology  Consultation Note      Bonifacio Mccoy Patient Status:  Inpatient    1940 MRN [id number]   Location [facility] 8NE-A Attending Carlos Vaughn, DO   Hosp Day # 0 PCP Hiram Nugent MD     Reason for consult: CAD    Primary cardiologist: Cornell Bryson MD     History of Present Illness:  Bonifacio Mccoy is a 83 year old male with known MV CAD from cath , deemed high risk for CABG, CKD 4 with baseline Cr ~ 2.9, HTN who presented to [facility] on 2024 for planned PCI tomorrow. He will have renal consultation and IVF today. He has been experiencing burning chest pain with exertion relieved by rest, sometimes at rest lying in bed. Currently no symptoms. There are no reports of dyspnea, palpitations, leg swelling, orthopnea, PND, lightheadedness, syncope, claudication, stroke/TIA symptoms, or any outward signs of bleeding.          Medications:  Current Facility-Administered Medications   Medication Dose Route Frequency    ondansetron (Zofran) 4 MG/2ML injection 4 mg  4 mg Intravenous Q4H PRN    atorvastatin (Lipitor) tab 80 mg  80 mg Oral Nightly    carvedilol (Coreg) tab 6.25 mg  6.25 mg Oral Daily    clopidogrel (Plavix) tab 75 mg  75 mg Oral Daily    ezetimibe (Zetia) tab 10 mg  10 mg Oral Nightly    [START ON 2024] tamsulosin (Flomax) cap 0.4 mg  0.4 mg Oral Daily    [START ON 2024] pantoprazole (Protonix) DR tab 40 mg  40 mg Oral QAM AC    glucose (Dex4) 15 GM/59ML oral liquid 15 g  15 g Oral Q15 Min PRN    Or    glucose (Glutose) 40% oral gel 15 g  15 g Oral Q15 Min PRN    Or    glucose-vitamin C (Dex-4) chewable tab 4 tablet  4 tablet Oral Q15 Min PRN    Or    dextrose 50% injection 50 mL  50 mL Intravenous Q15 Min PRN    Or    glucose (Dex4) 15 GM/59ML oral liquid 30 g  30 g Oral Q15 Min PRN    Or    glucose (Glutose) 40% oral gel 30 g  30 g Oral Q15 Min PRN    Or    glucose-vitamin C (Dex-4) chewable tab 8 tablet  8 tablet Oral  Q15 Min PRN    insulin aspart (NovoLOG) 100 Units/mL FlexPen 1-5 Units  1-5 Units Subcutaneous TID AC and HS    aspirin DR tab 325 mg  325 mg Oral Once    [START ON 2024] aspirin chewable tab 81 mg  81 mg Oral Daily    sodium chloride 0.9% infusion   Intravenous Continuous       Past Medical History:    Arthritis    Congestive heart disease (HCC)    Diabetes (HCC)    Essential hypertension    High blood pressure    High cholesterol    History of blood clots    Muscle weakness    Renal disorder    Sleep apnea    non compliant with CPAP       Past Surgical History:   Procedure Laterality Date    Nephrectomy N/A     Other surgical history N/A     nephrectomy       Family History  family history is not on file.    Social History   reports that he has never smoked. He has never used smokeless tobacco. He reports that he does not drink alcohol and does not use drugs.     Allergies  Allergies   Allergen Reactions    Losartan UNKNOWN    Lisinopril ITCHING       Review of Systems:  As per HPI, otherwise 10 point ROS is negative in detail.    Physical Exam:  Blood pressure 123/54, pulse 87, temperature 97.8 °F (36.6 °C), temperature source Oral, resp. rate 16, weight 179 lb 1.6 oz (81.2 kg), SpO2 98%.  Temp (24hrs), Av.8 °F (36.6 °C), Min:97.8 °F (36.6 °C), Max:97.8 °F (36.6 °C)    Wt Readings from Last 3 Encounters:   24 179 lb 1.6 oz (81.2 kg)   24 180 lb (81.6 kg)   23 177 lb (80.3 kg)       General: Awake and alert; in no acute distress  HEENT: Extraocular movements are intact; sclerae are anicteric; scalp is atraumatic  Neck: Supple; no JVD; no carotid bruits  Cardiac: Regular rate and regular rhythm; normal S1 and S2, no murmurs, rubs, or gallops are appreciated  Lungs: Clear to auscultation bilaterally; no accessory muscle use is noted, no wheezes, rhonci or rales  Abdomen: Soft, non-distended, non-tender; bowel sounds are normoactive  Extremities: Warm, no edema, clubbing or cyanosis; moves  all 4 extremities normally, distal pulses intact and equal  Psychiatric: Normal mood and affect; answers questions appropriately  Dermatologic: No rashes; normal skin turgor    Diagnostic testing:    Labs:   No results found for: \"PT\", \"INR\"     Lab Results   Component Value Date    PGLU 175 04/23/2024       Cardiac diagnostics:    Nuc stress 2/1/23  - Myocardial perfusion imaging: There is a small to moderate, mild,   reversible defect involving the basal and mid inferolateral wall(s),   consistent with ischemia.   - EF > 70%    EKG 8/19/22: SR 56 bpm, normal    Nuc stress 8/21: Normal perfusion    Echo 8/21  1. Left ventricle: The cavity size was normal. Wall thickness was normal.   Systolic function was normal. The estimated ejection fraction was 65-70%.   2. Mitral valve: Mildly calcified annulus.   3. Left atrium: The left atrium was mildly enlarged.     Impression:  83 year old male   Exertional angina  Multivessel CAD, not surgical candidate  CKD 4  HTN  DM2  Anemia - worsening    Recommendations:  No overt bleeding noted by patient. Given extent of CAD which will require multivessel stenting, he may be at high risk for GI bleed if we commit him to extended DAPT. If there is a bleeding lesion, perhaps this needs to be addressed by GI colleagues prior to PCI. Anemia workup and GI consultation, per hospitalist. Discussed with primary cardiologist Dr. Bryson.   Remains on ASA, plavix, statin, BB    Thank you for allowing our practice to participate in the care of your patient. Please do not hesitate to contact me if you have any questions.    Michael Ornelas MD  Interventional Cardiology  [facility]  Office: [phone]    4/23/2024  1:00 PM    Total encounter time 75 minutes.

## 2024-04-23 NOTE — H&P
[facility] Hospitalist H&P/Consult note       CC: scheduled PCI       PCP: Hiram Nugent MD    History of Present Illness: Patient is a 83 year old male with PMH sig for CAD, htn, hld, dm2, bph, CKD with hx of nephrectomy, here for scheduled procedure    Pt has known CAD, being medically managed but despite medication he has had ongoing anginal symptoms so PCI was recommended. He is admitted pre procedure for IVF hydration given his advanced CKD  He does report exertioanl sob and 'heartburn' that goes away wtihn few min after resting.   Labs this pm significant for dropping in hgb  No bleeding reported.     PMH  Past Medical History:    Arthritis    Congestive heart disease (HCC)    Diabetes (HCC)    Essential hypertension    High blood pressure    High cholesterol    History of blood clots    Muscle weakness    Renal disorder    Sleep apnea    non compliant with CPAP        PSH  Past Surgical History:   Procedure Laterality Date    Nephrectomy N/A     Other surgical history N/A     nephrectomy        ALL:  Allergies   Allergen Reactions    Losartan UNKNOWN    Lisinopril ITCHING        Home Medications:  Outpatient Medications Marked as Taking for the 4/23/24 encounter (Hospital Encounter)   Medication Sig Dispense Refill    ezetimibe 10 MG Oral Tab Take 1 tablet (10 mg total) by mouth nightly.      insulin glargine 100 UNIT/ML Subcutaneous Solution Inject 8 Units into the skin nightly.      losartan 25 MG Oral Tab Take 1 tablet (25 mg total) by mouth daily.      atorvastatin 80 MG Oral Tab Take 1 tablet (80 mg total) by mouth nightly.      allopurinol 300 MG Oral Tab Take 1 tablet (300 mg total) by mouth daily.      clopidogrel 75 MG Oral Tab Take 1 tablet (75 mg total) by mouth daily.      potassium chloride 20 MEQ Oral Powd Pack Take 20 mEq by mouth daily.      torsemide 20 MG Oral Tab Take 1 tablet (20 mg total) by mouth daily. Currently 3-4tabs  daily      calcitriol 0.25 MCG Oral Cap Take 1 capsule (0.25 mcg  total) by mouth daily.      Vitamin D3, Cholecalciferol, 25 MCG (1000 UT) Oral Tab Take 1 tablet (1,000 Units total) by mouth daily.      carvedilol 6.25 MG Oral Tab Take 1 tablet (6.25 mg total) by mouth daily.      tamsulosin HCl 0.4 MG Oral Cap Take 1 capsule (0.4 mg total) by mouth daily.      Omeprazole 40 MG Oral Capsule Delayed Release Take 1 capsule (40 mg total) by mouth daily.           Soc Hx  Social History     Tobacco Use    Smoking status: Never    Smokeless tobacco: Never   Substance Use Topics    Alcohol use: Never        Fam Hx  Dm in severla fam members    Review of Systems  Comprehensive ROS reviewed and negative except for what's stated above.        OBJECTIVE:  /54 (BP Location: Right arm)   Pulse 87   Temp 97.8 °F (36.6 °C) (Oral)   Resp 16   Wt 179 lb 1.6 oz (81.2 kg)   SpO2 98%   BMI 29.80 kg/m²   General:  Alert, no distress, appears stated age.   Head:  Normocephalic    Eyes:  Sclera anicteric, No conjunctival pallor, EOMs intact.    Throat: dry   Neck: Supple,    Lungs:   Clear to auscultation bilaterally. Normal effort   Chest wall:  No tenderness or deformity.   Heart:  Regular rate and rhythm    Abdomen:   Soft, NT/ND    Extremities: Atraumatic, no cyanosis or edema.   Skin: No visible rashes or lesions.    Neurologic: Normal strength, no focal deficit appreciated     Diagnostic Data:    CBC/Chem  Recent Labs   Lab 04/23/24  1237   WBC 8.1   HGB 7.7*   MCV 93.9   .0       Recent Labs   Lab 04/23/24  1237   *   K 4.4   CL 96*   CO2 26.0   BUN 63*   CREATSERUM 2.92*   *   CA 8.9       No results for input(s): \"ALT\", \"AST\", \"ALB\", \"AMYLASE\", \"LIPASE\", \"LDH\" in the last 168 hours.    Invalid input(s): \"ALPHOS\", \"TBIL\", \"DBIL\", \"TPROT\"    No results for input(s): \"TROP\" in the last 168 hours.         Radiology: XR CHEST AP PORTABLE  (CPT=71045)    Result Date: 4/2/2024  PROCEDURE:  XR CHEST AP PORTABLE  (CPT=71045)  TECHNIQUE:  AP chest radiograph was  obtained.  COMPARISON:  None.  INDICATIONS:  Chest pressure and heavyness, SOB, neck and jaw pain, 90/60s HX of diabetesx CAD  PATIENT STATED HISTORY: (As transcribed by Technologist)  Patient offered no additional history at this time.    FINDINGS:  Lung volumes are satisfactory.  No focal consolidation.  CP angles are sharp.  There is mild to moderate nonspecific peribronchial cuffing.  In the acute setting consider bronchitis or viral illness.  Heart and pulmonary vessels are normal caliber allowing for portable technique.  Mediastinal contours are smooth.  Atherosclerotic calcifications are seen in the thoracic aorta.  No pneumothorax.             CONCLUSION:  Peribronchial cuffing.   LOCATION:  [facility]      Dictated by (CST): Robby Dennis MD on 4/02/2024 at 7:12 PM     Finalized by (CST): Robby Dennis MD on 4/02/2024 at 7:13 PM          ASSESSMENT / PLAN:     Patient is a 83 year old male with PMH sig for CAD, htn, hld, dm2, bph, CKD with hx of nephrectomy, here for scheduled procedure    Impression    -CAD with exertional angina  -HTN  -HLD    -CKD 4 with hx of nephrectomy  -DM 2    Anemia      Plan    *CAD  -with SOLANO, plan for staged PCI, not a surgical candidate   -dw cards -plan was for angiogram but for now will hold off gvien anemia  -cont aspirin, plavix    *anemia  -hgb today 7.7.  Two weeks ago was 9.2 and was 11 last year  -repeat hgb and check iron stores  No gross bleeding noted  -dw cards, rec gi eval prior to angiogram    *HLD  -statin    *HTN  -on coreg    *CKD 4  -alysa op hydration with ivfs  -renal consulted  -risk for ROCK    *DM 2  -ISS /accuchecks    *BPH  -flomax    Scds      Further recommendations pending patient's clinical course. [facility] hospitalist to continue to follow patient while in house    Patient and/or patient's family given opportunity to ask questions and note understanding and agreeing with therapeutic plan as outlined    Carlos Durham Hospitalist  [phone]  Answering  Service: [phone]

## 2024-04-23 NOTE — PROGRESS NOTES
NURSING ADMISSION NOTE      Received Patient as a direct admission @ 1145    Oriented to room.  Safety precautions initiated.  Bed in low position.  Call light in reach.  Family at bedside      04/23/24 1125 04/23/24 1127 04/23/24 1129   Vital Signs   /52 114/54 123/54   MAP (mmHg) 74 72 74   BP Location Right arm Right arm Right arm   BP Method Automatic Automatic Automatic   Patient Position Lying Sitting Standing     Orthostatic b/p: pt asymptomatic

## 2024-04-23 NOTE — CONSULTS
[facility]  Report of Consultation    Bonifacio Mccoy Patient Status:  Inpatient    1940 MRN [id number]   Location [facility] 8NE-A Attending Carlos Vaughn, DO   Hosp Day # 0 PCP Hiram Nugent MD     Reason for Consultation:  CKD IV/need for iodinated contrast    History of Present Illness:  Bonifacio Mccoy is a a(n) 83 year old man with mult med probs incl CKD IV due to DM/HTN in setting of solitary kidney- R nephrectomy due to RCC who follows with Dr. Zain Holt at [facility] with most recent creat 2.9 mg/dl or so.  He has had ongoing issues with burning in his chest with known CAD.  He had been treated medically but decision made for him to undergo stenting which is planned for tomorrow.      History:  Past Medical History:    Arthritis    Congestive heart disease (HCC)    Diabetes (HCC)    Essential hypertension    High blood pressure    High cholesterol    History of blood clots    Muscle weakness    Renal disorder    Sleep apnea    non compliant with CPAP     Past Surgical History:   Procedure Laterality Date    Nephrectomy N/A     Other surgical history N/A     nephrectomy     History reviewed. No pertinent family history.   reports that he has never smoked. He has never used smokeless tobacco. He reports that he does not drink alcohol and does not use drugs.    Allergies:  Allergies   Allergen Reactions    Losartan UNKNOWN    Lisinopril ITCHING       Medications:    Current Facility-Administered Medications:     ondansetron (Zofran) 4 MG/2ML injection 4 mg, 4 mg, Intravenous, Q4H PRN    atorvastatin (Lipitor) tab 80 mg, 80 mg, Oral, Nightly    carvedilol (Coreg) tab 6.25 mg, 6.25 mg, Oral, Daily    clopidogrel (Plavix) tab 75 mg, 75 mg, Oral, Daily    ezetimibe (Zetia) tab 10 mg, 10 mg, Oral, Nightly    tamsulosin (Flomax) cap 0.4 mg, 0.4 mg, Oral, Daily    [START ON 2024] pantoprazole (Protonix) DR tab 40 mg, 40 mg, Oral, QAM AC    glucose (Dex4) 15 GM/59ML oral liquid 15 g, 15 g,  Oral, Q15 Min PRN **OR** glucose (Glutose) 40% oral gel 15 g, 15 g, Oral, Q15 Min PRN **OR** glucose-vitamin C (Dex-4) chewable tab 4 tablet, 4 tablet, Oral, Q15 Min PRN **OR** dextrose 50% injection 50 mL, 50 mL, Intravenous, Q15 Min PRN **OR** glucose (Dex4) 15 GM/59ML oral liquid 30 g, 30 g, Oral, Q15 Min PRN **OR** glucose (Glutose) 40% oral gel 30 g, 30 g, Oral, Q15 Min PRN **OR** glucose-vitamin C (Dex-4) chewable tab 8 tablet, 8 tablet, Oral, Q15 Min PRN    insulin aspart (NovoLOG) 100 Units/mL FlexPen 1-5 Units, 1-5 Units, Subcutaneous, TID AC and HS    aspirin DR tab 325 mg, 325 mg, Oral, Once    [START ON 2024] aspirin chewable tab 81 mg, 81 mg, Oral, Daily    sodium chloride 0.9% infusion, , Intravenous, Continuous  No current outpatient medications on file.       Review of Systems:  Denies fever/chills  Denies wt loss/gain  Denies HA or visual changes  + CP   Denies SOB/cough/hemoptysis  Denies abd or flank pain  Denies N/V/D  Denies change in urinary habits or gross hematuria  Denies LE edema  Denies skin rashes/myalgias/arthralgias      Physical Exam:   /54 (BP Location: Right arm)   Pulse 87   Temp 97.8 °F (36.6 °C) (Oral)   Resp 16   Wt 179 lb 1.6 oz (81.2 kg)   SpO2 98%   BMI 29.80 kg/m²   Temp (24hrs), Av.8 °F (36.6 °C), Min:97.8 °F (36.6 °C), Max:97.8 °F (36.6 °C)     No intake or output data in the 24 hours ending 24 1252  Last 3 Weights   24 1129 179 lb 1.6 oz (81.2 kg)   24 1128 179 lb 1.6 oz (81.2 kg)   24 1844 180 lb (81.6 kg)   23 1337 177 lb (80.3 kg)   22 1316 189 lb (85.7 kg)   21 1825 188 lb (85.3 kg)     General: Alert and oriented in no apparent distress.  HEENT: No scleral icterus, MMM  Neck: Supple, no ANA or thyromegaly  Cardiac: Regular rate and rhythm, S1, S2 normal, no murmur or rub  Lungs: Clear without wheezes, rales, rhonchi.    Abdomen: Soft, non-tender. + bowel sounds, no palpable organomegaly  Extremities:  Without clubbing, cyanosis or edema.  Neurologic: Alert and oriented, cranial nerves grossly intact, moving all extremities  Skin: Warm and dry, no rashes      Laboratory Data:  Lab Results   Component Value Date    PGLU 175 04/23/2024       BUN (mg/dL)   Date Value   04/02/2024 82 (H)   05/02/2023 53 (H)   08/29/2021 41 (H)   01/07/2012 31 (H)     CREATININE (mg/dL)   Date Value   01/07/2012 1.5 (H)     Creatinine (mg/dL)   Date Value   04/02/2024 3.13 (H)   05/02/2023 2.53 (H)   08/29/2021 2.47 (H)       Malb/Cre Calc   Date Value Ref Range Status   05/02/2023 57.4 (H) <=30.0 ug/mg Final     Comment:     <30 ug/mg creatinine       Normal     ug/mg creatinine   Microalbuminuria   >300 ug/mg creatinine      Albuminuria           No results for input(s): \"WBC\", \"HGB\", \"MCV\", \"PLT\", \"BAND\", \"INR\" in the last 168 hours.    Invalid input(s): \"LYM#\", \"MONO#\", \"BASOS#\", \"EOSIN#\"    No results for input(s): \"NA\", \"K\", \"CL\", \"CO2\", \"BUN\", \"CREATSERUM\", \"CA\", \"CAION\", \"MG\", \"PHOS\", \"GLU\" in the last 168 hours.    No results for input(s): \"ALT\", \"AST\", \"ALB\", \"AMYLASE\", \"LIPASE\", \"LDH\" in the last 168 hours.    Invalid input(s): \"ALPHOS\", \"TBIL\", \"DBIL\", \"TPROT\"    Recent Labs   Lab 04/23/24  1221   PGLU 175*           Impression/Plan:    #1.  CKD IV- due to DM/HTN in setting of solitary kidney.  Most recent creat 2.9 mg/dl or so.  D/w pt and dtr the risk of clinically sig JIMI incl risk of ESRD due to angiogram.  Suspect likelihood of JIMI on the order of 5-10% or so with risk of ESRD less than this and prob 1-5% or so at most.  Will start NS this evening for ROCK prophylaxis    #2.  HTN- cont usual med regimen    #3. MV CAD- in setting of angina.  PCI planned for tomorrow.     Thank you for allowing me to participate in the care of your patient. Please do not hesitate to call with any questions or concerns.       Michael Peterson MD  4/23/2024  12:52 PM

## 2024-04-23 NOTE — PLAN OF CARE
Pt alert and oriented x4. Family at bedside participating in pt care. Pt on tele in NSR. Denies any c/o of chest pain or shortness of breath. Pt on room air. Pt continent of bowel and bladder. Pt denies any c/o of pain. Pt endorses epigastric discomfort, MD aware.  Updated pt on plan of care, pt verbalizes understanding. Bed in lowest position and call light within reach.     Plan: Angiogram 4/24  GI consult    Problem: Diabetes/Glucose Control  Goal: Glucose maintained within prescribed range  Description: INTERVENTIONS:  - Monitor Blood Glucose as ordered  - Assess for signs and symptoms of hyperglycemia and hypoglycemia  - Administer ordered medications to maintain glucose within target range  - Assess barriers to adequate nutritional intake and initiate nutrition consult as needed  - Instruct patient on self management of diabetes  Outcome: Progressing     Problem: Patient/Family Goals  Goal: Patient/Family Long Term Goal  Description: Patient's Long Term Goal: stay out of hospital    Interventions:  - medication compliance   - see primary care physician   - follow up with cardiology  - See additional Care Plan goals for specific interventions  Outcome: Progressing  Goal: Patient/Family Short Term Goal  Description: Patient's Short Term Goal: go home    Interventions:   -  Angiogram 4/24  - GI consult   - nephrology consult   - See additional Care Plan goals for specific interventions  Outcome: Progressing     Problem: CARDIOVASCULAR - ADULT  Goal: Maintains optimal cardiac output and hemodynamic stability  Description: INTERVENTIONS:  - Monitor vital signs, rhythm, and trends  - Monitor for bleeding, hypotension and signs of decreased cardiac output  - Evaluate effectiveness of vasoactive medications to optimize hemodynamic stability  - Monitor arterial and/or venous puncture sites for bleeding and/or hematoma  - Assess quality of pulses, skin color and temperature  - Assess for signs of decreased coronary  artery perfusion - ex. Angina  - Evaluate fluid balance, assess for edema, trend weights  Outcome: Progressing  Goal: Absence of cardiac arrhythmias or at baseline  Description: INTERVENTIONS:  - Continuous cardiac monitoring, monitor vital signs, obtain 12 lead EKG if indicated  - Evaluate effectiveness of antiarrhythmic and heart rate control medications as ordered  - Initiate emergency measures for life threatening arrhythmias  - Monitor electrolytes and administer replacement therapy as ordered  Outcome: Progressing

## 2024-04-24 ENCOUNTER — APPOINTMENT (OUTPATIENT)
Dept: INTERVENTIONAL RADIOLOGY/VASCULAR | Facility: HOSPITAL | Age: 84
End: 2024-04-24
Attending: HOSPITALIST
Payer: MEDICARE

## 2024-04-24 PROBLEM — N18.4 ANEMIA IN STAGE 4 CHRONIC KIDNEY DISEASE (HCC): Status: ACTIVE | Noted: 2024-04-02

## 2024-04-24 PROBLEM — D63.1 ANEMIA IN STAGE 4 CHRONIC KIDNEY DISEASE (HCC): Status: ACTIVE | Noted: 2024-04-02

## 2024-04-24 LAB
ANION GAP SERPL CALC-SCNC: 7 MMOL/L (ref 0–18)
BUN BLD-MCNC: 57 MG/DL (ref 9–23)
CALCIUM BLD-MCNC: 8.5 MG/DL (ref 8.5–10.1)
CHLORIDE SERPL-SCNC: 97 MMOL/L (ref 98–112)
CO2 SERPL-SCNC: 24 MMOL/L (ref 21–32)
CREAT BLD-MCNC: 2.62 MG/DL
DEPRECATED HBV CORE AB SER IA-ACNC: 82.5 NG/ML
EGFRCR SERPLBLD CKD-EPI 2021: 24 ML/MIN/1.73M2 (ref 60–?)
ERYTHROCYTE [DISTWIDTH] IN BLOOD BY AUTOMATED COUNT: 14.7 %
GLUCOSE BLD-MCNC: 114 MG/DL (ref 70–99)
GLUCOSE BLD-MCNC: 118 MG/DL (ref 70–99)
GLUCOSE BLD-MCNC: 119 MG/DL (ref 70–99)
GLUCOSE BLD-MCNC: 147 MG/DL (ref 70–99)
GLUCOSE BLD-MCNC: 166 MG/DL (ref 70–99)
GLUCOSE BLD-MCNC: 205 MG/DL (ref 70–99)
GLUCOSE BLD-MCNC: 67 MG/DL (ref 70–99)
HCT VFR BLD AUTO: 21.8 %
HGB BLD-MCNC: 10.1 G/DL
HGB BLD-MCNC: 7.7 G/DL
IRON SATN MFR SERPL: 18 %
IRON SERPL-MCNC: 46 UG/DL
MCH RBC QN AUTO: 31.6 PG (ref 26–34)
MCHC RBC AUTO-ENTMCNC: 35.3 G/DL (ref 31–37)
MCV RBC AUTO: 89.3 FL
OSMOLALITY SERPL CALC.SUM OF ELEC: 283 MOSM/KG (ref 275–295)
PLATELET # BLD AUTO: 187 10(3)UL (ref 150–450)
POTASSIUM SERPL-SCNC: 4 MMOL/L (ref 3.5–5.1)
RBC # BLD AUTO: 2.44 X10(6)UL
SODIUM SERPL-SCNC: 128 MMOL/L (ref 136–145)
TIBC SERPL-MCNC: 259 UG/DL (ref 240–450)
TRANSFERRIN SERPL-MCNC: 174 MG/DL (ref 200–360)
WBC # BLD AUTO: 7.5 X10(3) UL (ref 4–11)

## 2024-04-24 PROCEDURE — 30233N1 TRANSFUSION OF NONAUTOLOGOUS RED BLOOD CELLS INTO PERIPHERAL VEIN, PERCUTANEOUS APPROACH: ICD-10-PCS | Performed by: HOSPITALIST

## 2024-04-24 PROCEDURE — 82962 GLUCOSE BLOOD TEST: CPT

## 2024-04-24 PROCEDURE — 85018 HEMOGLOBIN: CPT | Performed by: HOSPITALIST

## 2024-04-24 PROCEDURE — 83550 IRON BINDING TEST: CPT | Performed by: INTERNAL MEDICINE

## 2024-04-24 PROCEDURE — 85027 COMPLETE CBC AUTOMATED: CPT | Performed by: HOSPITALIST

## 2024-04-24 PROCEDURE — 82728 ASSAY OF FERRITIN: CPT | Performed by: INTERNAL MEDICINE

## 2024-04-24 PROCEDURE — 36430 TRANSFUSION BLD/BLD COMPNT: CPT

## 2024-04-24 PROCEDURE — 80048 BASIC METABOLIC PNL TOTAL CA: CPT | Performed by: INTERNAL MEDICINE

## 2024-04-24 PROCEDURE — 83540 ASSAY OF IRON: CPT | Performed by: INTERNAL MEDICINE

## 2024-04-24 RX ORDER — HEPARIN SODIUM 5000 [USP'U]/ML
INJECTION, SOLUTION INTRAVENOUS; SUBCUTANEOUS
Status: COMPLETED
Start: 2024-04-24 | End: 2024-04-24

## 2024-04-24 RX ORDER — SODIUM CHLORIDE 9 MG/ML
INJECTION, SOLUTION INTRAVENOUS ONCE
Status: DISCONTINUED | OUTPATIENT
Start: 2024-04-24 | End: 2024-04-26

## 2024-04-24 RX ORDER — PANTOPRAZOLE SODIUM 40 MG/1
40 TABLET, DELAYED RELEASE ORAL
Status: DISCONTINUED | OUTPATIENT
Start: 2024-04-24 | End: 2024-04-26

## 2024-04-24 RX ORDER — IODIXANOL 320 MG/ML
100 INJECTION, SOLUTION INTRAVASCULAR
Status: COMPLETED | OUTPATIENT
Start: 2024-04-24 | End: 2024-04-25

## 2024-04-24 RX ORDER — LIDOCAINE HYDROCHLORIDE 10 MG/ML
INJECTION, SOLUTION EPIDURAL; INFILTRATION; INTRACAUDAL; PERINEURAL
Status: COMPLETED
Start: 2024-04-24 | End: 2024-04-24

## 2024-04-24 NOTE — OCCUPATIONAL THERAPY NOTE
OT orders received and chart reviewed. Pt with scheduled PCI today. Pt is mobilizing well with nursing. Will follow up after procedure. RN aware.

## 2024-04-24 NOTE — PLAN OF CARE
Received patient at 0730. Alert and Oriented x4. Kiswahili speaking, son at bedside. Tele Rhythm NSR. Pt on RA. Breath sounds clear. Bed is locked and in low position. Call light and personal items within reach. No C/O chest pain or shortness of breath. Pt voiding with no issue. Pt ambulates with stand by assist and walker. IV NS infusing. Consent completed in chart. Reviewed plan of care and patient verbalizes understanding.     Plan:  Angiogram    1330: blood consent completed and in chart. Blood hung with second RN Collins.     Problem: Diabetes/Glucose Control  Goal: Glucose maintained within prescribed range  Description: INTERVENTIONS:  - Monitor Blood Glucose as ordered  - Assess for signs and symptoms of hyperglycemia and hypoglycemia  - Administer ordered medications to maintain glucose within target range  - Assess barriers to adequate nutritional intake and initiate nutrition consult as needed  - Instruct patient on self management of diabetes  Outcome: Progressing     Problem: Patient/Family Goals  Goal: Patient/Family Long Term Goal  Description: Patient's Long Term Goal: stay out of hospital    Interventions:  - medication compliance   - see primary care physician   - follow up with cardiology  - See additional Care Plan goals for specific interventions  Outcome: Progressing  Goal: Patient/Family Short Term Goal  Description: Patient's Short Term Goal: go home    Interventions:   -  Angiogram 4/24  - GI consult   - nephrology consult   - See additional Care Plan goals for specific interventions  Outcome: Progressing     Problem: CARDIOVASCULAR - ADULT  Goal: Maintains optimal cardiac output and hemodynamic stability  Description: INTERVENTIONS:  - Monitor vital signs, rhythm, and trends  - Monitor for bleeding, hypotension and signs of decreased cardiac output  - Evaluate effectiveness of vasoactive medications to optimize hemodynamic stability  - Monitor arterial and/or venous puncture sites for bleeding  and/or hematoma  - Assess quality of pulses, skin color and temperature  - Assess for signs of decreased coronary artery perfusion - ex. Angina  - Evaluate fluid balance, assess for edema, trend weights  Outcome: Progressing  Goal: Absence of cardiac arrhythmias or at baseline  Description: INTERVENTIONS:  - Continuous cardiac monitoring, monitor vital signs, obtain 12 lead EKG if indicated  - Evaluate effectiveness of antiarrhythmic and heart rate control medications as ordered  - Initiate emergency measures for life threatening arrhythmias  - Monitor electrolytes and administer replacement therapy as ordered  Outcome: Progressing

## 2024-04-24 NOTE — PAYOR COMM NOTE
--------------  ADMISSION REVIEW     Payor: [facility]  Subscriber #:  [id number]  Authorization Number: [id number]    Admit date: 4/23/24  Admit time: 10:39 AM       [facility] Hospitalist H&P/Consult note         CC: scheduled PCI        PCP: Hiram Nugent MD     History of Present Illness: Patient is a 83 year old male with PMH sig for CAD, htn, hld, dm2, bph, CKD with hx of nephrectomy, here for scheduled procedure     Pt has known CAD, being medically managed but despite medication he has had ongoing anginal symptoms so PCI was recommended. He is admitted pre procedure for IVF hydration given his advanced CKD  He does report exertioanl sob and 'heartburn' that goes away wtihn few min after resting.   Labs this pm significant for dropping in hgb  No bleeding reported.     ASSESSMENT / PLAN:      Patient is a 83 year old male with PMH sig for CAD, htn, hld, dm2, bph, CKD with hx of nephrectomy, here for scheduled procedure     Impression     -CAD with exertional angina  -HTN  -HLD     -CKD 4 with hx of nephrectomy  -DM 2     Anemia        Plan     *CAD  -with SOLANO, plan for staged PCI, not a surgical candidate   -dw cards -plan was for angiogram but for now will hold off gvien anemia  -cont aspirin, plavix     *anemia  -hgb today 7.7.  Two weeks ago was 9.2 and was 11 last year  -repeat hgb and check iron stores  No gross bleeding noted  -dw cards, rec gi eval prior to angiogram     *HLD  -statin     *HTN  -on coreg     *CKD 4  -alysa op hydration with ivfs  -renal consulted  -risk for ROCK     *DM 2  -ISS /accuchecks     *BPH  -flomax     Scds        Further recommendations pending patient's clinical course. [facility] hospitalist to continue to follow patient while in house     Patient and/or patient's family given opportunity to ask questions and note understanding and agreeing with therapeutic plan as outlined     Carlos Durham Hospitalist  [phone]  Answering Service: [phone]         4/23  CARDIOLOGY:    Reason for consult: CAD     Primary cardiologist: Cornell Bryson MD      History of Present Illness:  Bonifacio Mccoy is a 83 year old male with known MV CAD from cath 2/23, deemed high risk for CABG, CKD 4 with baseline Cr ~ 2.9, HTN who presented to [facility] on 4/23/[facility]24 for planned PCI tomorrow. He will have renal consultation and IVF today. He has been experiencing burning chest pain with exertion relieved by rest, sometimes at rest lying in bed. Currently no symptoms. There are no reports of dyspnea, palpitations, leg swelling, orthopnea, PND, lightheadedness, syncope, claudication, stroke/TIA symptoms, or any outward signs of bleeding.          Impression:  83 year old male   Exertional angina  Multivessel CAD, not surgical candidate  CKD 4  HTN  DM2  Anemia - worsening     Recommendations:  No overt bleeding noted by patient. Given extent of CAD which will require multivessel stenting, he may be at high risk for GI bleed if we commit him to extended DAPT. If there is a bleeding lesion, perhaps this needs to be addressed by GI colleagues prior to PCI. Anemia workup and GI consultation, per hospitalist. Discussed with primary cardiologist Dr. Bryson.   Remains on ASA, plavix, statin, BB     Thank you for allowing our practice to participate in the care of your patient. Please do not hesitate to contact me if you have any questions.     Michael Ornelas MD    4/23 NEPHROLOGY:    Reason for Consultation:  CKD IV/need for iodinated contrast     History of Present Illness:  Bonifacio Mccoy is a a(n) 83 year old man with mult med probs incl CKD IV due to DM/HTN in setting of solitary kidney- R nephrectomy due to RCC who follows with Dr. Zain Holt at [facility] with most recent creat 2.9 mg/dl or so.  He has had ongoing issues with burning in his chest with known CAD.  He had been treated medically but decision made for him to undergo stenting which is planned for tomorrow.            Impression/Plan:     #1.  CKD IV- due to DM/HTN in setting of solitary kidney.  Most recent creat 2.9 mg/dl or so.  D/w pt and dtr the risk of clinically sig JIMI incl risk of ESRD due to angiogram.  Suspect likelihood of JIMI on the order of 5-10% or so with risk of ESRD less than this and prob 1-5% or so at most.  Will start NS this evening for ROCK prophylaxis     #2.  HTN- cont usual med regimen     #3. MV CAD- in setting of angina.  PCI planned for tomorrow.      Thank you for allowing me to participate in the care of your patient. Please do not hesitate to call with any questions or concerns.        Michael Peterson MD  4/23/2024  12:52 PM        4/24 NEPHROLOGY:    SUBJECTIVE:  Stable this AM       Labs:            Recent Labs   Lab 04/23/24  1237 04/23/24  1831 04/24/24  0539   WBC 8.1  --  7.5   HGB 7.7* 8.4* 7.7*   MCV 93.9  --  89.3   .0  --  187.0              Recent Labs   Lab 04/23/24  1237 04/24/24  0539   * 128*   K 4.4 4.0   CL 96* 97*   CO2 26.0 24.0   BUN 63* 57*   CREATSERUM 2.92* 2.62*   CA 8.9 8.5   * 114*         Impression/Plan:       #1.  CKD IV- due to DM/HTN in setting of solitary kidney.  Most recent creat 2.9 mg/dl or so.  D/w pt and dtr the risk of clinically sig JIMI incl risk of ESRD due to angiogram.  Suspect likelihood of JIMI on the order of 5-10% or so with risk of ESRD less than this and prob 1-5% or so at most.  Cont NS until 6 PM today     #2.  HTN- cont usual med regimen     #3. MV CAD- in setting of angina.  PCI planned this afternoon     #4.  Anemia- likely at least in part due to CKD.  Pt has not been receiving CHASE as outpt.  Will dose with retacrit today.  Check Fe studies as well     Questions/concerns were discussed with patient and/or family by bedside.    Michael Peterson MD  4/24/2024 4/24 HOSPITALIST:    ASSESSMENT/PLAN:     Patient is a 83 year old male with PMH sig for CAD, htn, hld, dm2, bph, CKD with hx of nephrectomy, here for scheduled  procedure     Impression     -CAD with exertional angina  -HTN  -HLD     -CKD 4 with hx of nephrectomy  -hyponatremia, chronic  -DM 2     Anemia        Plan     *CAD  -with SOLANO, plan for staged PCI, not a surgical candidate   -dw cards -plan was for angiogram but for now will hold off gvien worsening anemia  -cont aspirin, plavix     *anemia  -hgb on admission 7.7.  Two weeks ago was 9.2 and was 11 last year  -iron levels low, ferritin low normal  No gross bleeding noted  -dw cards, rec gi eval prior to angiogram. GI consulted     *HLD  -statin     *HTN  -on coreg     *CKD 4  -alysa op hydration with ivfs  -renal consulted  -risk for ROCK with contrast exposure     *DM 2  -ISS /accuchecks     *BPH  -flomax     Scds      Addendum     Dw cards, rec to keep hgb > 8, will transfuse 1unit  Plan for angio tomorrow     Will continue to follow while hospitalized. Please page me or the on-call hospitalist with questions or concerns.     Carlos Durham Hospitalist  [phone]  Answering Service: [phone]     MEDICATIONS ADMINISTERED IN LAST 1 DAY:  acetaminophen (Tylenol) tab 650 mg       Date Action Dose Route User    4/23/2024 2100 Given 650 mg Oral Herminio Anaya RN          aspirin DR tab 325 mg       Date Action Dose Route User    4/23/2024 1303 Given 325 mg Oral Yahaira Rich RN          aspirin chewable tab 81 mg       Date Action Dose Route User    4/24/2024 0758 Given 81 mg Oral Annia Bender RN          atorvastatin (Lipitor) tab 80 mg       Date Action Dose Route User    4/23/2024 2025 Given 80 mg Oral Herminio Anaya RN          carvedilol (Coreg) tab 6.25 mg       Date Action Dose Route User    4/23/2024 1303 Given 6.25 mg Oral Yahaira Rich RN          clopidogrel (Plavix) tab 75 mg       Date Action Dose Route User    4/24/2024 0758 Given 75 mg Oral Annia Bender RN    4/23/2024 1303 Given 75 mg Oral Yahaira Rich RN          epoetin shannan (Epogen, Procrit) 15315 UNIT/ML injection  20,000 Units       Date Action Dose Route User    4/24/2024 0935 Given 20,000 Units Subcutaneous (Left Lower Abdomen) Annia Bender RN          ezetimibe (Zetia) tab 10 mg       Date Action Dose Route User    4/23/2024 2025 Given 10 mg Oral Herminio Anaya RN          insulin aspart (NovoLOG) 100 Units/mL FlexPen 1-5 Units       Date Action Dose Route User    4/23/2024 1255 Given 1 Units Subcutaneous (Left Upper Arm) Yahaira Rich RN          pantoprazole (Protonix) DR tab 40 mg       Date Action Dose Route User    4/24/2024 0539 Given 40 mg Oral DivinagraciaPhi RN          sodium chloride 0.9% infusion       Date Action Dose Route User    4/23/2024 2322 New Bag (none) Intravenous Herminio Anaya RN          tamsulosin (Flomax) cap 0.4 mg       Date Action Dose Route User    4/24/2024 0759 Given 0.4 mg Oral Annia Bender RN            Vitals (last day)       Date/Time Temp Pulse Resp BP SpO2 Weight O2 Device O2 Flow Rate (L/min) [facility]    04/24/24 0750 98.4 °F (36.9 °C) 60 17 113/46 99 % -- None (Room air) 0 L/min NG    04/24/24 0545 98.2 °F (36.8 °C) 60 17 114/51 96 % -- None (Room air) -- AD    04/23/24 2327 97.8 °F (36.6 °C) 61 15 135/57 98 % -- -- -- MD    04/23/24 1850 97.4 °F (36.3 °C) 53 10 124/55 100 % -- -- -- MM    04/23/24 1628 97.7 °F (36.5 °C) 61 14 111/40 100 % -- None (Room air) -- CM    04/23/24 1129 97.8 °F (36.6 °C) 87 16 123/54 98 % 179 lb 1.6 oz -- -- CM    04/23/24 1128 -- -- -- -- -- 179 lb 1.6 oz -- -- KB    04/23/24 1127 -- 76 17 114/54 100 % -- -- -- CM    04/23/24 1125 -- 71 12 131/52 100 % -- -- -- CM

## 2024-04-24 NOTE — CONSULTS
[facility] IP Report of Consultation Gastroenterology    4/24/2024    Bonifacio Mccoy  male   Chris Mccoy DO     [id number]  9/30/1940 Primary Care Physician  Hiram Nugent MD     Reason for Consultation: Anemia    HPI:  Patient is a 83 year old male with PMH sig for CAD, htn, hld, dm2, bph, CKD with hx of nephrectomy. Patient is here for anemia. No nausea or vomiting. No rectal bleeding. No unintentional weight loss. No diarrhea or constipation. Does not take any iron supplements     PROBLEM LIST:     Patient Active Problem List   Diagnosis    Hyponatremia    Anemia    Hyperglycemia    Chest pain of uncertain etiology    Left arm pain    Hyponatremia    Anemia in stage 4 chronic kidney disease (HCC)    Azotemia    Metabolic acidosis    Hyperglycemia    Chest pain of uncertain etiology    CKD (chronic kidney disease) stage 4, GFR 15-29 ml/min (HCC)    Coronary artery disease involving native heart with angina pectoris (HCC)       PATIENT HISTORY:     Past Medical History:    Arthritis    Congestive heart disease (HCC)    Diabetes (HCC)    Essential hypertension    High blood pressure    High cholesterol    History of blood clots    Muscle weakness    Renal disorder    Sleep apnea    non compliant with CPAP      Past Surgical History:   Procedure Laterality Date    Nephrectomy N/A     Other surgical history N/A     nephrectomy      History reviewed. No pertinent family history.   Social History     Socioeconomic History    Marital status:    Tobacco Use    Smoking status: Never    Smokeless tobacco: Never   Vaping Use    Vaping status: Never Used   Substance and Sexual Activity    Alcohol use: Never    Drug use: Never     Social Determinants of Health     Food Insecurity: No Food Insecurity (4/23/2024)    Food Insecurity     Food Insecurity: Never true   Transportation Needs: No Transportation Needs (4/23/2024)    Transportation Needs     Lack of Transportation: No   Housing Stability: Low Risk   (4/23/2024)    Housing Stability     Housing Instability: No        Allergies;  Allergies   Allergen Reactions    Losartan UNKNOWN    Lisinopril ITCHING        Medications:    Current Facility-Administered Medications:     pantoprazole (Protonix) DR tab 40 mg, 40 mg, Oral, BID AC    sodium chloride 0.9% infusion, , Intravenous, On Call    atorvastatin (Lipitor) tab 80 mg, 80 mg, Oral, Nightly    carvedilol (Coreg) tab 6.25 mg, 6.25 mg, Oral, Daily    clopidogrel (Plavix) tab 75 mg, 75 mg, Oral, Daily    ezetimibe (Zetia) tab 10 mg, 10 mg, Oral, Nightly    tamsulosin (Flomax) cap 0.4 mg, 0.4 mg, Oral, Daily    glucose (Dex4) 15 GM/59ML oral liquid 15 g, 15 g, Oral, Q15 Min PRN **OR** glucose (Glutose) 40% oral gel 15 g, 15 g, Oral, Q15 Min PRN **OR** glucose-vitamin C (Dex-4) chewable tab 4 tablet, 4 tablet, Oral, Q15 Min PRN **OR** dextrose 50% injection 50 mL, 50 mL, Intravenous, Q15 Min PRN **OR** glucose (Dex4) 15 GM/59ML oral liquid 30 g, 30 g, Oral, Q15 Min PRN **OR** glucose (Glutose) 40% oral gel 30 g, 30 g, Oral, Q15 Min PRN **OR** glucose-vitamin C (Dex-4) chewable tab 8 tablet, 8 tablet, Oral, Q15 Min PRN    insulin aspart (NovoLOG) 100 Units/mL FlexPen 1-5 Units, 1-5 Units, Subcutaneous, TID AC and HS    aspirin chewable tab 81 mg, 81 mg, Oral, Daily    sodium chloride 0.9% infusion, , Intravenous, Continuous    acetaminophen (Tylenol) tab 650 mg, 650 mg, Oral, Q6H PRN     REVIEW OF SYSTEMS:   GENERAL: well developed, well nourished, in no apparent distress  HEENT: normocephalic; normal nose, pharynx and TM's  EYES: PERRLA, EOMI, sclera anicteric, conjunctiva normal; fundi normal  NECK: supple, FROM, no nodes, no JVD, no thyromegaly, no carotid bruits  RESPIRATORY: clear to percussion and auscultation  CARDIOVASCULAR: S1, S2 normal, RRR; no S3, no S4; no click; murmur negative  ABDOMEN: normal, active bowel sounds, no masses, HSM or tenderness  RECTAL: ---  EXTREMITIES: no cyanosis, clubbing or edema,  peripheral pulses intact  PSYCHIATRIC: alert, oriented times 3          EXAM:   /46 (BP Location: Right arm)   Pulse 60   Temp 98.4 °F (36.9 °C) (Oral)   Resp 17   Wt 179 lb 1.6 oz (81.2 kg)   SpO2 99%   BMI 29.80 kg/m²  Body mass index is 29.8 kg/m².  GENERAL: Well developed, well nourished, in no obvious distress.   ABDOMEN: Bowel sounds normoactive. Soft, no organomegaly or masses appreciated. Nontender.   EXTREMITIES: Without cyanosis. No peripheral edema appreciated.   RECTAL: Deferred.   NEURO: Motor and Gait grossly intact. Alert and Oriented x 3.        LAB/IMAGING RESULTS:     Lab Results   Component Value Date    WBC 7.5 04/24/2024    HGB 7.7 04/24/2024    HCT 21.8 04/24/2024    .0 04/24/2024     [unfilled]  Lab Results   Component Value Date    WBC 7.5 04/24/2024    HGB 7.7 04/24/2024    HCT 21.8 04/24/2024    .0 04/24/2024    CREATSERUM 2.62 04/24/2024    BUN 57 04/24/2024     04/24/2024    K 4.0 04/24/2024    CL 97 04/24/2024    CO2 24.0 04/24/2024     04/24/2024    CA 8.5 04/24/2024    PGLU 118 04/24/2024         ASSESSMENT AND PLAN:   Anemia of chronic disease  - No evidence of GI bleed     GASTRITIS   2 polyps, ascending colon, status post cold snare polypectomy  2 polyps, transverse colon, status post cold snare polypectomy  1 polyp, sigmoid colon, status post cold snare polypectomy  Internal hemorrhoids    PLAN:  Pantoprazole 40 mg daily   Carafate 1 gm two times daily for 8 weeks   No indication for EGD or colonoscopy at this time   D/w family member at bedside         The patient indicates understanding of these issues and agrees to the plan.  Chris Mccoy DO  4/24/2024  11:38 AM

## 2024-04-24 NOTE — PROGRESS NOTES
[facility]   part of [facility]     Nephrology Progress Note    Bonifacio Mccoy Patient Status:  Inpatient    1940 MRN [id number]   Location [facility] 8NE-A Attending Carlos Vaughn, DO   Hosp Day # 1 PCP Hiram Nugent MD       SUBJECTIVE:  Stable this AM        Physical Exam:   /46 (BP Location: Right arm)   Pulse 60   Temp 98.4 °F (36.9 °C) (Oral)   Resp 17   Wt 179 lb 1.6 oz (81.2 kg)   SpO2 99%   BMI 29.80 kg/m²   Temp (24hrs), Av.9 °F (36.6 °C), Min:97.4 °F (36.3 °C), Max:98.4 °F (36.9 °C)       Intake/Output Summary (Last 24 hours) at 2024 0849  Last data filed at 2024 0750  Gross per 24 hour   Intake 1513.57 ml   Output --   Net 1513.57 ml     Last 3 Weights   24 1129 179 lb 1.6 oz (81.2 kg)   24 1128 179 lb 1.6 oz (81.2 kg)   24 1844 180 lb (81.6 kg)   23 1337 177 lb (80.3 kg)   22 1316 189 lb (85.7 kg)   21 1825 188 lb (85.3 kg)     General: Alert and oriented in no apparent distress.  HEENT: No scleral icterus, MMM  Neck: Supple, no ANA or thyromegaly  Cardiac: Regular rate and rhythm, S1, S2 normal, no murmur or rub  Lungs: Clear without wheezes, rales, rhonchi.    Abdomen: Soft, non-tender. + bowel sounds, no palpable organomegaly  Extremities: Without clubbing, cyanosis or edema.  Neurologic: Alert and oriented, cranial nerves grossly intact, moving all extremities  Skin: Warm and dry, no rash        Labs:     Recent Labs   Lab 24  1237 24  1831 24  0539   WBC 8.1  --  7.5   HGB 7.7* 8.4* 7.7*   MCV 93.9  --  89.3   .0  --  187.0       Recent Labs   Lab 24  1237 24  0539   * 128*   K 4.4 4.0   CL 96* 97*   CO2 26.0 24.0   BUN 63* 57*   CREATSERUM 2.92* 2.62*   CA 8.9 8.5   * 114*       No results for input(s): \"ALT\", \"AST\", \"ALB\", \"AMYLASE\", \"LIPASE\", \"LDH\" in the last 168 hours.    Invalid input(s): \"ALPHOS\", \"TBIL\", \"DBIL\", \"TPROT\"    Recent Labs   Lab  04/23/24  1221 04/23/24  1628 04/23/24  2105 04/24/24  0542   PGLU 175* 108* 97 118*       Meds:   Current Facility-Administered Medications   Medication Dose Route Frequency    pantoprazole (Protonix) DR tab 40 mg  40 mg Oral BID AC    sodium chloride 0.9% infusion   Intravenous On Call    atorvastatin (Lipitor) tab 80 mg  80 mg Oral Nightly    carvedilol (Coreg) tab 6.25 mg  6.25 mg Oral Daily    clopidogrel (Plavix) tab 75 mg  75 mg Oral Daily    ezetimibe (Zetia) tab 10 mg  10 mg Oral Nightly    tamsulosin (Flomax) cap 0.4 mg  0.4 mg Oral Daily    glucose (Dex4) 15 GM/59ML oral liquid 15 g  15 g Oral Q15 Min PRN    Or    glucose (Glutose) 40% oral gel 15 g  15 g Oral Q15 Min PRN    Or    glucose-vitamin C (Dex-4) chewable tab 4 tablet  4 tablet Oral Q15 Min PRN    Or    dextrose 50% injection 50 mL  50 mL Intravenous Q15 Min PRN    Or    glucose (Dex4) 15 GM/59ML oral liquid 30 g  30 g Oral Q15 Min PRN    Or    glucose (Glutose) 40% oral gel 30 g  30 g Oral Q15 Min PRN    Or    glucose-vitamin C (Dex-4) chewable tab 8 tablet  8 tablet Oral Q15 Min PRN    insulin aspart (NovoLOG) 100 Units/mL FlexPen 1-5 Units  1-5 Units Subcutaneous TID AC and HS    aspirin chewable tab 81 mg  81 mg Oral Daily    sodium chloride 0.9% infusion   Intravenous Continuous    acetaminophen (Tylenol) tab 650 mg  650 mg Oral Q6H PRN         Impression/Plan:      #1.  CKD IV- due to DM/HTN in setting of solitary kidney.  Most recent creat 2.9 mg/dl or so.  D/w pt and dtr the risk of clinically sig JIMI incl risk of ESRD due to angiogram.  Suspect likelihood of JIMI on the order of 5-10% or so with risk of ESRD less than this and prob 1-5% or so at most.  Cont NS until 6 PM today     #2.  HTN- cont usual med regimen     #3. MV CAD- in setting of angina.  PCI planned this afternoon    #4.  Anemia- likely at least in part due to CKD.  Pt has not been receiving CHASE as outpt.  Will dose with retacrit today.  Check Fe studies as  well    Questions/concerns were discussed with patient and/or family by bedside.          Michael Peterson MD  4/24/2024  8:49 AM

## 2024-04-24 NOTE — PLAN OF CARE
Problem: Diabetes/Glucose Control  Goal: Glucose maintained within prescribed range  Description: INTERVENTIONS:  - Monitor Blood Glucose as ordered  - Assess for signs and symptoms of hyperglycemia and hypoglycemia  - Administer ordered medications to maintain glucose within target range  - Assess barriers to adequate nutritional intake and initiate nutrition consult as needed  - Instruct patient on self management of diabetes  Outcome: Progressing     Problem: Patient/Family Goals  Goal: Patient/Family Long Term Goal  Description: Patient's Long Term Goal: stay out of hospital    Interventions:  - medication compliance   - see primary care physician   - follow up with cardiology  - See additional Care Plan goals for specific interventions  Outcome: Progressing  Goal: Patient/Family Short Term Goal  Description: Patient's Short Term Goal: go home    Interventions:   -  Angiogram 4/24  - GI consult   - nephrology consult   - See additional Care Plan goals for specific interventions  Outcome: Progressing     Problem: CARDIOVASCULAR - ADULT  Goal: Maintains optimal cardiac output and hemodynamic stability  Description: INTERVENTIONS:  - Monitor vital signs, rhythm, and trends  - Monitor for bleeding, hypotension and signs of decreased cardiac output  - Evaluate effectiveness of vasoactive medications to optimize hemodynamic stability  - Monitor arterial and/or venous puncture sites for bleeding and/or hematoma  - Assess quality of pulses, skin color and temperature  - Assess for signs of decreased coronary artery perfusion - ex. Angina  - Evaluate fluid balance, assess for edema, trend weights  Outcome: Progressing  Goal: Absence of cardiac arrhythmias or at baseline  Description: INTERVENTIONS:  - Continuous cardiac monitoring, monitor vital signs, obtain 12 lead EKG if indicated  - Evaluate effectiveness of antiarrhythmic and heart rate control medications as ordered  - Initiate emergency measures for life  threatening arrhythmias  - Monitor electrolytes and administer replacement therapy as ordered  Outcome: Progressing

## 2024-04-24 NOTE — PHYSICAL THERAPY NOTE
PT orders received and chart reviewed. Pt with scheduled PCI today. Pt is mobilizing well with nursing. Will follow up after procedure. RN aware.

## 2024-04-24 NOTE — PROGRESS NOTES
CC: follow-up hospital admission angina    SUBJECTIVE:  Interval History:     No acute issues overngiht  No bleeding seen  He deneid any cp  Family at bs    OBJECTIVE:  Scheduled Meds:    sodium chloride   Intravenous On Call    atorvastatin  80 mg Oral Nightly    carvedilol  6.25 mg Oral Daily    clopidogrel  75 mg Oral Daily    ezetimibe  10 mg Oral Nightly    tamsulosin  0.4 mg Oral Daily    pantoprazole  40 mg Oral QAM AC    insulin aspart  1-5 Units Subcutaneous TID AC and HS    aspirin  81 mg Oral Daily     Continuous Infusions:    sodium chloride 83 mL/hr at 04/23/24 2322     PRN Meds:   glucose **OR** glucose **OR** glucose-vitamin C **OR** dextrose **OR** glucose **OR** glucose **OR** glucose-vitamin C    acetaminophen    PHYSICAL EXAM  Vital signs: Temp:  [97.4 °F (36.3 °C)-98.2 °F (36.8 °C)] 98.2 °F (36.8 °C)  Pulse:  [53-87] 60  Resp:  [10-17] 17  BP: (111-135)/(40-57) 114/51  SpO2:  [96 %-100 %] 96 %      GENERAL - NAD, AAO  EYES- sclera anicteric,   HENT- normocephalic, OP - dry  NECK - supple  CV- RRR  RESP - CTAB, normal resp effort  ABDOMEN- soft, NT/ND   EXT- trace edema  PSYCH - normal mentation/ normal affect    Data Review:   Labs:   Recent Labs   Lab 04/23/24  1237 04/23/24  1831 04/24/24  0539   WBC 8.1  --  7.5   HGB 7.7* 8.4* 7.7*   MCV 93.9  --  89.3   .0  --  187.0       Recent Labs   Lab 04/23/24  1237 04/24/24  0539   * 128*   K 4.4 4.0   CL 96* 97*   CO2 26.0 24.0   BUN 63* 57*   CREATSERUM 2.92* 2.62*   CA 8.9 8.5   * 114*       No results for input(s): \"ALT\", \"AST\", \"ALB\", \"AMYLASE\", \"LIPASE\", \"LDH\" in the last 168 hours.    Invalid input(s): \"ALPHOS\", \"TBIL\", \"DBIL\", \"TPROT\"    Recent Labs   Lab 04/23/24  1221 04/23/24  1628 04/23/24  2105 04/24/24  0542   PGLU 175* 108* 97 118*           ASSESSMENT/PLAN:    Patient is a 83 year old male with PMH sig for CAD, htn, hld, dm2, bph, CKD with hx of nephrectomy, here for scheduled procedure     Impression     -CAD with  exertional angina  -HTN  -HLD     -CKD 4 with hx of nephrectomy  -hyponatremia, chronic  -DM 2     Anemia        Plan     *CAD  -with SOLANO, plan for staged PCI, not a surgical candidate   -dw cards -plan was for angiogram but for now will hold off gvien worsening anemia  -cont aspirin, plavix     *anemia  -hgb on admission 7.7.  Two weeks ago was 9.2 and was 11 last year  -iron levels low, ferritin low normal  No gross bleeding noted  -dw cards, rec gi eval prior to angiogram. GI consulted     *HLD  -statin     *HTN  -on coreg     *CKD 4  -alysa op hydration with ivfs  -renal consulted  -risk for ROCK with contrast exposure     *DM 2  -ISS /accuchecks     *BPH  -flomax     Scds     Addendum    Dw cards, rec to keep hgb > 8, will transfuse 1unit  Plan for angio tomorrow    Will continue to follow while hospitalized. Please page me or the on-call hospitalist with questions or concerns.    Cralos Durham Hospitalist  [phone]  Answering Service: [phone]

## 2024-04-25 ENCOUNTER — APPOINTMENT (OUTPATIENT)
Dept: INTERVENTIONAL RADIOLOGY/VASCULAR | Facility: HOSPITAL | Age: 84
End: 2024-04-25
Attending: HOSPITALIST
Payer: MEDICARE

## 2024-04-25 LAB
ANION GAP SERPL CALC-SCNC: 4 MMOL/L (ref 0–18)
BLOOD TYPE BARCODE: 1700
BUN BLD-MCNC: 53 MG/DL (ref 9–23)
CALCIUM BLD-MCNC: 8.9 MG/DL (ref 8.5–10.1)
CHLORIDE SERPL-SCNC: 106 MMOL/L (ref 98–112)
CO2 SERPL-SCNC: 27 MMOL/L (ref 21–32)
CREAT BLD-MCNC: 2.56 MG/DL
EGFRCR SERPLBLD CKD-EPI 2021: 24 ML/MIN/1.73M2 (ref 60–?)
ERYTHROCYTE [DISTWIDTH] IN BLOOD BY AUTOMATED COUNT: 15.1 %
GLUCOSE BLD-MCNC: 105 MG/DL (ref 70–99)
GLUCOSE BLD-MCNC: 118 MG/DL (ref 70–99)
GLUCOSE BLD-MCNC: 167 MG/DL (ref 70–99)
GLUCOSE BLD-MCNC: 185 MG/DL (ref 70–99)
GLUCOSE BLD-MCNC: 91 MG/DL (ref 70–99)
GLUCOSE BLD-MCNC: 98 MG/DL (ref 70–99)
HCT VFR BLD AUTO: 30.1 %
HGB BLD-MCNC: 10.2 G/DL
ISTAT ACTIVATED CLOTTING TIME: 315 SECONDS (ref 74–137)
MAGNESIUM SERPL-MCNC: 2.2 MG/DL (ref 1.6–2.6)
MCH RBC QN AUTO: 30.8 PG (ref 26–34)
MCHC RBC AUTO-ENTMCNC: 33.9 G/DL (ref 31–37)
MCV RBC AUTO: 90.9 FL
OSMOLALITY SERPL CALC.SUM OF ELEC: 299 MOSM/KG (ref 275–295)
PLATELET # BLD AUTO: 210 10(3)UL (ref 150–450)
POTASSIUM SERPL-SCNC: 4.5 MMOL/L (ref 3.5–5.1)
RBC # BLD AUTO: 3.31 X10(6)UL
SODIUM SERPL-SCNC: 137 MMOL/L (ref 136–145)
UNIT VOLUME: 350 ML
WBC # BLD AUTO: 9.3 X10(3) UL (ref 4–11)

## 2024-04-25 PROCEDURE — 93010 ELECTROCARDIOGRAM REPORT: CPT | Performed by: INTERNAL MEDICINE

## 2024-04-25 PROCEDURE — 82962 GLUCOSE BLOOD TEST: CPT

## 2024-04-25 PROCEDURE — 80048 BASIC METABOLIC PNL TOTAL CA: CPT | Performed by: HOSPITALIST

## 2024-04-25 PROCEDURE — 97535 SELF CARE MNGMENT TRAINING: CPT

## 2024-04-25 PROCEDURE — 027034Z DILATION OF CORONARY ARTERY, ONE ARTERY WITH DRUG-ELUTING INTRALUMINAL DEVICE, PERCUTANEOUS APPROACH: ICD-10-PCS | Performed by: INTERNAL MEDICINE

## 2024-04-25 PROCEDURE — 4A023N7 MEASUREMENT OF CARDIAC SAMPLING AND PRESSURE, LEFT HEART, PERCUTANEOUS APPROACH: ICD-10-PCS | Performed by: INTERNAL MEDICINE

## 2024-04-25 PROCEDURE — 93458 L HRT ARTERY/VENTRICLE ANGIO: CPT | Performed by: INTERNAL MEDICINE

## 2024-04-25 PROCEDURE — 97116 GAIT TRAINING THERAPY: CPT

## 2024-04-25 PROCEDURE — B211YZZ FLUOROSCOPY OF MULTIPLE CORONARY ARTERIES USING OTHER CONTRAST: ICD-10-PCS | Performed by: INTERNAL MEDICINE

## 2024-04-25 PROCEDURE — B240ZZ3 ULTRASONOGRAPHY OF SINGLE CORONARY ARTERY, INTRAVASCULAR: ICD-10-PCS | Performed by: INTERNAL MEDICINE

## 2024-04-25 PROCEDURE — 85347 COAGULATION TIME ACTIVATED: CPT

## 2024-04-25 PROCEDURE — 85027 COMPLETE CBC AUTOMATED: CPT | Performed by: HOSPITALIST

## 2024-04-25 PROCEDURE — 99153 MOD SED SAME PHYS/QHP EA: CPT | Performed by: INTERNAL MEDICINE

## 2024-04-25 PROCEDURE — 83735 ASSAY OF MAGNESIUM: CPT | Performed by: HOSPITALIST

## 2024-04-25 PROCEDURE — 97161 PT EVAL LOW COMPLEX 20 MIN: CPT

## 2024-04-25 PROCEDURE — 93005 ELECTROCARDIOGRAM TRACING: CPT

## 2024-04-25 PROCEDURE — 99152 MOD SED SAME PHYS/QHP 5/>YRS: CPT | Performed by: INTERNAL MEDICINE

## 2024-04-25 PROCEDURE — 97165 OT EVAL LOW COMPLEX 30 MIN: CPT

## 2024-04-25 PROCEDURE — 92928 PRQ TCAT PLMT NTRAC ST 1 LES: CPT | Performed by: INTERNAL MEDICINE

## 2024-04-25 PROCEDURE — 92978 ENDOLUMINL IVUS OCT C 1ST: CPT | Performed by: INTERNAL MEDICINE

## 2024-04-25 RX ORDER — HEPARIN SODIUM 5000 [USP'U]/ML
INJECTION, SOLUTION INTRAVENOUS; SUBCUTANEOUS
Status: COMPLETED
Start: 2024-04-25 | End: 2024-04-25

## 2024-04-25 RX ORDER — LIDOCAINE HYDROCHLORIDE 10 MG/ML
INJECTION, SOLUTION EPIDURAL; INFILTRATION; INTRACAUDAL; PERINEURAL
Status: COMPLETED
Start: 2024-04-25 | End: 2024-04-25

## 2024-04-25 RX ORDER — NITROGLYCERIN 20 MG/100ML
INJECTION INTRAVENOUS
Status: COMPLETED
Start: 2024-04-25 | End: 2024-04-25

## 2024-04-25 RX ORDER — MIDAZOLAM HYDROCHLORIDE 1 MG/ML
INJECTION INTRAMUSCULAR; INTRAVENOUS
Status: COMPLETED
Start: 2024-04-25 | End: 2024-04-25

## 2024-04-25 RX ORDER — SODIUM CHLORIDE 9 MG/ML
INJECTION, SOLUTION INTRAVENOUS CONTINUOUS
Status: ACTIVE | OUTPATIENT
Start: 2024-04-25 | End: 2024-04-25

## 2024-04-25 RX ORDER — VERAPAMIL HYDROCHLORIDE 2.5 MG/ML
INJECTION, SOLUTION INTRAVENOUS
Status: COMPLETED
Start: 2024-04-25 | End: 2024-04-25

## 2024-04-25 NOTE — PROGRESS NOTES
CC: follow-up hospital admission angina    SUBJECTIVE:  Interval History:     No acute issues overngiht  Was able to lay / sleep flat last night, no sob  No nv    OBJECTIVE:  Scheduled Meds:    pantoprazole  40 mg Oral BID AC    sodium chloride   Intravenous Once    atorvastatin  80 mg Oral Nightly    carvedilol  6.25 mg Oral Daily    clopidogrel  75 mg Oral Daily    ezetimibe  10 mg Oral Nightly    tamsulosin  0.4 mg Oral Daily    insulin aspart  1-5 Units Subcutaneous TID AC and HS    aspirin  81 mg Oral Daily     Continuous Infusions:       PRN Meds:   iodixanol    glucose **OR** glucose **OR** glucose-vitamin C **OR** dextrose **OR** glucose **OR** glucose **OR** glucose-vitamin C    acetaminophen    PHYSICAL EXAM  Vital signs: Temp:  [97.9 °F (36.6 °C)-98.8 °F (37.1 °C)] 98.8 °F (37.1 °C)  Pulse:  [57-97] 66  Resp:  [10-23] 16  BP: (126-160)/(53-64) 137/53  SpO2:  [96 %-100 %] 99 %      GENERAL - NAD, AAO  EYES- sclera anicteric,   HENT- normocephalic, OP - dry  NECK - supple  CV- RRR  RESP - decreased at bases, normal resp effort  ABDOMEN- soft, NT/ND   EXT- trace edema  PSYCH - normal mentation/ normal affect    Data Review:   Labs:   Recent Labs   Lab 04/23/24  1237 04/23/24  1831 04/24/24  0539 04/24/24  1830 04/25/24  0546   WBC 8.1  --  7.5  --  9.3   HGB 7.7* 8.4* 7.7* 10.1* 10.2*   MCV 93.9  --  89.3  --  90.9   .0  --  187.0  --  210.0       Recent Labs   Lab 04/23/24  1237 04/24/24  0539 04/25/24  0546   * 128* 137   K 4.4 4.0 4.5   CL 96* 97* 106   CO2 26.0 24.0 27.0   BUN 63* 57* 53*   CREATSERUM 2.92* 2.62* 2.56*   CA 8.9 8.5 8.9   MG  --   --  2.2   * 114* 118*       No results for input(s): \"ALT\", \"AST\", \"ALB\", \"AMYLASE\", \"LIPASE\", \"LDH\" in the last 168 hours.    Invalid input(s): \"ALPHOS\", \"TBIL\", \"DBIL\", \"TPROT\"    Recent Labs   Lab 04/24/24  1239 04/24/24  1500 04/24/24  1655 04/24/24  2102 04/25/24  0531   PGLU 119* 147* 205* 166* 105*           ASSESSMENT/PLAN:    Patient  is a 83 year old male with PMH sig for CAD, htn, hld, dm2, bph, CKD with hx of nephrectomy, here for scheduled procedure     Impression     -CAD with exertional angina  -HTN  -HLD     -CKD 4 with hx of nephrectomy  -hyponatremia, chronic  -DM 2     Anemia        Plan     *CAD  -with SOLANO, plan for staged PCI, not a surgical candidate   -dw cards -plan was for angiogram    -cont aspirin, plavix     *anemia  -hgb on admission 7.7.  Two weeks ago was 9.2 and was 11 last year  -iron levels low, ferritin low normal  No gross bleeding noted  -dw cards, rec gi eval prior to angiogram. GI consulted  -gi saw, no further intervention. Ok to proceed with angio from their standpoint  -anemia likel from chronic disease / renal disease but slow chronic blood loss unable to be ruled out. Has had cscope and egd 2 years ago, gastritis and polyps noted  Transfused 1 unit to keep> 8 gvien angina      *HLD  -statin     *HTN  -on coreg     *CKD 4  -alysa op hydration with ivfs  -renal consulted  -risk for ROCK with contrast exposure     *DM 2  -ISS /accuchecks     *BPH  -flomax     Scds          Will continue to follow while hospitalized. Please page me or the on-call hospitalist with questions or concerns.    Carlos Durham Hospitalist  [phone]  Answering Service: [phone]

## 2024-04-25 NOTE — PROCEDURES
OPERATIVE REPORT - CARDIAC CATHETERIZATION    Date of Procedure: 4/25/2024     Performing Physician: Michael Ornelas MD    Pre-Procedure Diagnosis:   1. CAD  2. Unstable angina    Post-Procedure Diagnosis:  1. Same as pre    Findings:  1. Left main: No stenosis  2. LAD: Proximal 75% diffuse moderately calcified plaque, short segment mid intramyocardial with mild systolic compression, several small diagonal branches  3. Left circumflex: Gives off high OM1 with 75% proximal plaquing, then  prox LCX.  4. RCA: Proximal to mid diffuse severe disease, worst > 90%. PDA and RPL luminal irregularities. Distal RPL supplies grade 3 collaterals to LCX backfilling to proximal OM.   5. Hemodynamics: LVEDP 10 mmHg. No LV-Ao gradient on pullback.     Conclusions / Recommendations:  1. Severe multivessel CAD, some progression since last angiogram from March 2023. We treated the more critical stenosis in the RCA today, which also supplies collaterals to the occluded LCX. The LAD has stably severe disease.    2. S/p IVUS guided PCI of prox-mid RCA with LEXIE x 1 (3.0 x 38 mm Synergy), post dilated 3.5 NC high pressure.   3. We used only 15 ml contrast, relying mostly on landmark and IVUS rather than angiography. I think his renal function should stay stable. We gave another 800 ml NS during the procedure as his LVEDP was normal ~ 10 mmHg.   4. Consider bringing him back after a month to treat the LAD. The OM1 is more of a medium sized branch and could be left for medical therapy if he is not suffering debilitating angina. .   5. Remove the TR band per protocol  6. Continue DAPT, statin, medical therapy    -----------------------    Procedures performed:   1. Left heart catheterization  2. Selective bilateral coronary angiography  3. Moderate sedation  4. Access of right radial artery    Indications: This is a 83 year old male presenting for left heart catheterization with coronary angiography to evaluate for obstructive CAD.      Description of Procedure: Informed consent was obtained from the patient in writing. The risks and benefits of the procedure were reviewed in detail with the patient, including but not limited to the risk of myocardial infarction, stroke, renal failure, bleeding, and death. After a thorough discussion of these risks and benefits, the patient agreed to proceed and signed an informed consent document. The patient was brought to the cardiac catheterization laboratory in the fasting state. Moderate sedation was employed using a total of IV Versed 1 mg and IV fentanyl 25 mcg in divided doses.  I directly observed the patient from 1435 to 1535, and an independent trained observer was present and assisted in the monitoring of the patient's level of consciousness and physiological status, heart rate, blood pressure, oximetry, and rhythm. All operators present for the case performed standard pre-procedural prep including hand washing, sterile gloves, gown, mask, and cap. All aspects of the maximum sterile barrier technique were followed. A preprocedural time out was performed with all physicians, technologists, and nurses involved with the procedure. 1% lidocaine was infiltrated subcutaneously in the right wrist for local anesthesia. Access was obtained in the right radial artery with a 5/6F Slender Glidesheath using the Seldinger technique and a micropuncture needle with a single anterior wall puncture. Standard vasodilator cocktail was given with nitroglycerin 200 mcg and verapamil 2.5 mg through the arterial sheath. Weight based heparin given to target therapeutic ACT and was monitored routinely throughout the case with additional heparin given as needed to keep ACT in therapeutic range at least > 250 sec. A 6F TIG catheter was advanced over a J tipped wire through the arterial sheath and placed in the aortic root, then used to selectively engage the left coronary artery. In an effort to conserve contrast, (patient  extremely fearful of needing HD), we took only one cranial view to see the LAD, and this appeared stable from his angio from 3/23. The catheter was then prolapsed into the LV over a wire and placed at the base of the LV. LV systolic and end diastolic pressure was then recorded. The catheter was pulled back and pullback measurements of LV and aortic pressure and recorded. The catheter was then removed over a wire and exchanged over a wire for a 6FJR4 guide and the RCA was then engaged and we took a limited angiogram of the RCA showing mostly stable, perhaps some progression of disease. From here I tried to conserve contrast and wired the vessel with a Stefano blue, then predilated with a 2.0 x 30 mm balloon. IVUS would not pass, so I predilated again with a 3.0 x 20 mm balloon. IVUS again would not pass, so I tried to place a 3.0 x 38 mm Synergy stent, however again, it would not make the first bend. With the help of a Guideliner, I was then able to position the stent, and then deployed this at 14 omari, treating essentially up to the ostium. I then post dilated the length of the stent, staying within the stent edges with a 3.5 NC to 18 omari. I performed IVUS again, showing a focal segment of underexpansion in the proximal stent, so I again post dilated this with a 3.5 NC up to 24 omari, again staying within the stent edge, with full expansion of the balloon. We took one final angiogram with the wire out showing good stent expansion and apposition, JOANNA 3 flow, no dissection or perforation. At the conclusion of the procedure, all catheters and wires were removed over a wire. The arterial sheath was removed and hemostasis achieved with standard TR band using patent hemostasis technique. There was no bleeding or hematoma. The patient tolerated the procedure well without complication. EBL <10 mL. Total contrast ~ 15 mL mL. See procedure log for fluoro time and radiation dose.      Michael Ornelas MD  Interventional  Cardiology  [facility]

## 2024-04-25 NOTE — PLAN OF CARE
Patient received at 1930, alert and oriented x4, Irish speaking with family at the bedside.  Sinus rhythm on tele.   Room air, lung sounds clear/diminished bilaterally. Non-productive cough noted. Patient and family educated on flutter valve use. Continuous pulse oximetry maintained. Denies shortness of breath and dizziness.   Complaints of bilateral knee pain. Pain med administered.  Continent of bowel and bladder.  Safety precautions maintained.    Discussed plan of care with patient. All questions answered.    Problem: Diabetes/Glucose Control  Goal: Glucose maintained within prescribed range  Description: INTERVENTIONS:  - Monitor Blood Glucose as ordered  - Assess for signs and symptoms of hyperglycemia and hypoglycemia  - Administer ordered medications to maintain glucose within target range  - Assess barriers to adequate nutritional intake and initiate nutrition consult as needed  - Instruct patient on self management of diabetes  Outcome: Progressing     Problem: Patient/Family Goals  Goal: Patient/Family Long Term Goal  Description: Patient's Long Term Goal: stay out of hospital    Interventions:  - medication compliance   - see primary care physician   - follow up with cardiology  - See additional Care Plan goals for specific interventions  Outcome: Progressing  Goal: Patient/Family Short Term Goal  Description: Patient's Short Term Goal: go home    Interventions:   -  Angiogram 4/24  - GI consult   - nephrology consult   - See additional Care Plan goals for specific interventions  Outcome: Progressing     Problem: CARDIOVASCULAR - ADULT  Goal: Maintains optimal cardiac output and hemodynamic stability  Description: INTERVENTIONS:  - Monitor vital signs, rhythm, and trends  - Monitor for bleeding, hypotension and signs of decreased cardiac output  - Evaluate effectiveness of vasoactive medications to optimize hemodynamic stability  - Monitor arterial and/or venous puncture sites for bleeding and/or  hematoma  - Assess quality of pulses, skin color and temperature  - Assess for signs of decreased coronary artery perfusion - ex. Angina  - Evaluate fluid balance, assess for edema, trend weights  Outcome: Progressing  Goal: Absence of cardiac arrhythmias or at baseline  Description: INTERVENTIONS:  - Continuous cardiac monitoring, monitor vital signs, obtain 12 lead EKG if indicated  - Evaluate effectiveness of antiarrhythmic and heart rate control medications as ordered  - Initiate emergency measures for life threatening arrhythmias  - Monitor electrolytes and administer replacement therapy as ordered  Outcome: Progressing

## 2024-04-25 NOTE — OCCUPATIONAL THERAPY NOTE
OCCUPATIONAL THERAPY EVALUATION - INPATIENT    Room Number: 8621/8621-A  Evaluation Date: 4/25/2024     Type of Evaluation: Initial  Presenting Problem: chest pain    Physician Order: IP Consult to Occupational Therapy  Reason for Therapy:  ADL/IADL Dysfunction and Discharge Planning      OCCUPATIONAL THERAPY ASSESSMENT   Patient is a 83 year old male admitted on 4/23/2024 with Presenting Problem: chest pain. Co-Morbidities : CAD, HTN, HLD, DMT2, BPH, CKD, nephrectomy  Patient is currently functioning near baseline with toileting, upper body dressing, lower body dressing, grooming, bed mobility, transfers, stating sitting balance, dynamic sitting balance, static standing balance, dynamic standing balance, maintaining seated position, and functional standing tolerance.  Prior to admission, per dtr who is present for session patient's baseline is Mod A for showering and LB dressing, Sup for toileting and UB dressing and self feeding.  Patient met all OT goals at baseline level.  Patient reports no further questions/concerns at this time.     Patient will benefit from continued skilled OT Services at discharge to promote functional independence and safety with additional support and return home with home health OT      WEIGHT BEARING RESTRICTION  Weight Bearing Restriction: None                Recommendations for nursing staff:   Transfers: sup  Toileting location: Toilet    EVALUATION SESSION:  Patient at start of session: supine in bed for session  FUNCTIONAL TRANSFER ASSESSMENT  Sit to Stand: Chair  Chair: Supervision  Toilet Transfer: Supervision    BED MOBILITY  Scooting: Sup in chair    BALANCE ASSESSMENT  Static Sitting: Supervision  Sitting Bilateral: Supervision  Static Standing: Supervision  Standing Bilateral: Supervision    FUNCTIONAL ADL ASSESSMENT  UB Dressing Seated: Supervision (for britney robe)  LB Dressing Seated: Moderate Assist (for socks and shoes, pt has assist at home)  Toileting Seated:  Supervision (at std toilet)      ACTIVITY TOLERANCE: vitals stable                         O2 SATURATIONS       COGNITION  Overall Cognitive Status:  WFL - within functional limits  COGNITION ASSESSMENTS       Upper Extremity:   ROM: within functional limits   Strength: is within functional limits   Coordination:  Gross motor: WNl  Fine motor: WNL  Sensation: Light touch:  intact    EDUCATION PROVIDED  Patient: Plan of Care; Role of Occupational Therapy  Patient's Response to Education: Verbalized Understanding; Returned Demonstration    Equipment used: RW  Demonstrates functional use    Therapist comments: Pt reported fatigue at home, pt educated on work simplification and energy conservation for at home to assist with independence with fatigue at home.    Patient End of Session: Up in chair;Call light within reach;Needs met;RN aware of session/findings;All patient questions and concerns addressed;Alarm set;Family present    OCCUPATIONAL PROFILE    HOME SITUATION  Type of Home: House  Home Layout: Two level;Elevator;Ramped entrance  Lives With:  (lives with his brother and his family)    Toilet and Equipment: Comfort height toilet  Shower/Tub and Equipment: Walk-in shower  Other Equipment: None    Occupation/Status: retired     Drives: No  Patient Regularly Uses: Glasses    Prior Level of Function: Prior to admission, per dtr who is present for session patient's baseline is Mod A for showering and LB dressing,    SUBJECTIVE  Pt stated, \"I can get up.\"    PAIN ASSESSMENT  Ratin  Location: no pain at this time       OBJECTIVE  Precautions: Bed/chair alarm; needed (primary language is Frisian)  Fall Risk: High fall risk    WEIGHT BEARING RESTRICTION  Weight Bearing Restriction: None                AM-PAC ‘6-Clicks’ Inpatient Daily Activity Short Form  -   Putting on and taking off regular lower body clothing?: A Little  -   Bathing (including washing, rinsing, drying)?: A Little  -   Toileting, which  includes using toilet, bedpan or urinal? : A Little  -   Putting on and taking off regular upper body clothing?: A Little  -   Taking care of personal grooming such as brushing teeth?: A Little  -   Eating meals?: A Little    AM-PAC Score:  Score: 18  Approx Degree of Impairment: 46.65%  Standardized Score (AM-PAC Scale): 38.66      ADDITIONAL TESTS     NEUROLOGICAL FINDINGS        PLAN   Patient has been evaluated and presents with no skilled Occupational Therapy needs at this time.  Patient discharged from Occupational Therapy services.  Please re-order if a new functional limitation presents during this admission.      Patient Evaluation Complexity Level:   Occupational Profile/Medical History LOW - Brief history including review of medical or therapy records    Specific performance deficits impacting engagement in ADL/IADL LOW  1 - 3 performance deficits    Client Assessment/Performance Deficits LOW - No comorbidities nor modifications of tasks    Clinical Decision Making LOW - Analysis of occupational profile, problem-focused assessments, limited treatment options    Overall Complexity LOW     OT Session Time: 15 minutes  Self-Care Home Management: 10 minutes  Therapeutic Activity: 0 minutes  Neuromuscular Re-education: 0 minutes  Therapeutic Exercise: 0 minutes  Cognitive Skills: 0 minutes  Sensory Integrative: 0 minutes  Orthotic Management and Trainin minutes  Can add/delete any of these

## 2024-04-25 NOTE — PROGRESS NOTES
[facility]  Consultation Note      Bonifacio Mccoy Patient Status:  Inpatient    1940 MRN [id number]   Location [facility] 8NE-A Attending Carlos Vaughn,    Hosp Day # 1 PCP Hiram Nugent MD     Reason for consult: CAD    Subjective: No CP or SOB    Impression:  83 year old male   Exertional angina  Multivessel CAD, not surgical candidate  CKD 4  HTN  DM2  Anemia - GI has seen, no e/o bleeding, likely related to CKD    Recommendations:  Renal recommendations reviewed, appreciate. Retacrit dose today. Will transfuse 1 u PRBC today, then plan for PCI tomorrow. IVF per renal.   Remains on ASA, plavix, statin, BB  Discussed with primary cardiologist Dr. Bryson.     Primary cardiologist: Cornell Bryson MD     History of Present Illness:  Bonifacio Mccoy is a 83 year old male with known MV CAD from cath , deemed high risk for CABG, CKD 4 with baseline Cr ~ 2.9, HTN who presented to [facility] on 2024 for planned PCI tomorrow. He will have renal consultation and IVF today. He has been experiencing burning chest pain with exertion relieved by rest, sometimes at rest lying in bed. Currently no symptoms. There are no reports of dyspnea, palpitations, leg swelling, orthopnea, PND, lightheadedness, syncope, claudication, stroke/TIA symptoms, or any outward signs of bleeding.          Medications:  Current Facility-Administered Medications   Medication Dose Route Frequency    pantoprazole (Protonix) DR tab 40 mg  40 mg Oral BID AC    iodixanol (VISIPAQUE) 320 MG/ML injection 100 mL  100 mL Injection ONCE PRN    sodium chloride 0.9% infusion   Intravenous Once    atorvastatin (Lipitor) tab 80 mg  80 mg Oral Nightly    carvedilol (Coreg) tab 6.25 mg  6.25 mg Oral Daily    clopidogrel (Plavix) tab 75 mg  75 mg Oral Daily    ezetimibe (Zetia) tab 10 mg  10 mg Oral Nightly    tamsulosin (Flomax) cap 0.4 mg  0.4 mg Oral Daily    glucose (Dex4) 15 GM/59ML oral  liquid 15 g  15 g Oral Q15 Min PRN    Or    glucose (Glutose) 40% oral gel 15 g  15 g Oral Q15 Min PRN    Or    glucose-vitamin C (Dex-4) chewable tab 4 tablet  4 tablet Oral Q15 Min PRN    Or    dextrose 50% injection 50 mL  50 mL Intravenous Q15 Min PRN    Or    glucose (Dex4) 15 GM/59ML oral liquid 30 g  30 g Oral Q15 Min PRN    Or    glucose (Glutose) 40% oral gel 30 g  30 g Oral Q15 Min PRN    Or    glucose-vitamin C (Dex-4) chewable tab 8 tablet  8 tablet Oral Q15 Min PRN    insulin aspart (NovoLOG) 100 Units/mL FlexPen 1-5 Units  1-5 Units Subcutaneous TID AC and HS    aspirin chewable tab 81 mg  81 mg Oral Daily    acetaminophen (Tylenol) tab 650 mg  650 mg Oral Q6H PRN       Past Medical History:    Arthritis    Congestive heart disease (HCC)    Diabetes (HCC)    Essential hypertension    High blood pressure    High cholesterol    History of blood clots    Muscle weakness    Renal disorder    Sleep apnea    non compliant with CPAP       Past Surgical History:   Procedure Laterality Date    Nephrectomy N/A     Other surgical history N/A     nephrectomy       Family History  family history is not on file.    Social History   reports that he has never smoked. He has never used smokeless tobacco. He reports that he does not drink alcohol and does not use drugs.     Allergies  Allergies   Allergen Reactions    Losartan UNKNOWN    Lisinopril ITCHING       Review of Systems:  As per HPI, otherwise 10 point ROS is negative in detail.    Physical Exam:  Blood pressure 137/59, pulse 71, temperature 98 °F (36.7 °C), temperature source Oral, resp. rate 21, weight 179 lb 1.6 oz (81.2 kg), SpO2 100%.  Temp (24hrs), Av.1 °F (36.7 °C), Min:97.8 °F (36.6 °C), Max:98.4 °F (36.9 °C)    Wt Readings from Last 3 Encounters:   24 179 lb 1.6 oz (81.2 kg)   24 180 lb (81.6 kg)   23 177 lb (80.3 kg)       General: Awake and alert; in no acute distress  HEENT: Extraocular movements are intact; sclerae are  anicteric; scalp is atraumatic  Neck: Supple; no JVD; no carotid bruits  Cardiac: Regular rate and regular rhythm; normal S1 and S2, no murmurs, rubs, or gallops are appreciated  Lungs: Clear to auscultation bilaterally; no accessory muscle use is noted, no wheezes, rhonci or rales  Abdomen: Soft, non-distended, non-tender; bowel sounds are normoactive  Extremities: Warm, no edema, clubbing or cyanosis; moves all 4 extremities normally, distal pulses intact and equal  Psychiatric: Normal mood and affect; answers questions appropriately  Dermatologic: No rashes; normal skin turgor    Diagnostic testing:    Labs:   No results found for: \"PT\", \"INR\"     Lab Results   Component Value Date    WBC 7.5 04/24/2024    HGB 10.1 04/24/2024    HCT 21.8 04/24/2024    .0 04/24/2024    CREATSERUM 2.62 04/24/2024    BUN 57 04/24/2024     04/24/2024    K 4.0 04/24/2024    CL 97 04/24/2024    CO2 24.0 04/24/2024     04/24/2024    CA 8.5 04/24/2024    PGLU 205 04/24/2024       Cardiac diagnostics:    Nuc stress 2/1/23  - Myocardial perfusion imaging: There is a small to moderate, mild,   reversible defect involving the basal and mid inferolateral wall(s),   consistent with ischemia.   - EF > 70%    EKG 8/19/22: SR 56 bpm, normal    Nuc stress 8/21: Normal perfusion    Echo 8/21  1. Left ventricle: The cavity size was normal. Wall thickness was normal.   Systolic function was normal. The estimated ejection fraction was 65-70%.   2. Mitral valve: Mildly calcified annulus.   3. Left atrium: The left atrium was mildly enlarged.     Informed consent:  Patient seen and examined independently. H and P reviewed. No changes in H and P. Risks and benefits of procedure were discussed with patient. Airway examined.  Patient is ASA class 3 and Mallampati class 2. Pt is appropriate for conscious sedation. No history of difficult airway. The risks, benefits, indications and alternatives of left heart catheterization and coronary  angigoraphy with possible percutaneous coronary intervention were discussed. The risks include, but are not limited to: bleeding, anemia requiring blood transfusion, allergic reaction, hypotension, infection, vascular injury, injury to upper or lower extremity requiring endovascular or open surgical repair,  kidney failure, stroke, cardiac or pulmonary artery perforation, pericardial effusion and tamponade requiring pericardiocentesis, myocardial infarction (heart attack), respiratory failure needing intubation, cardiac arrest, need for emergent surgery, and death. Benefits of the procedure include: symptomatic improvement, diagnosis of coronary artery disease or other forms of heart disease and possibly prevention of myocardial infarction. Alternatives to the procedure include: not performing cardiac catheterization, treatment with medications only, and observation. The patient and any accompanying family have considered the above, all questions have been answered to the best of my ability to their satisfaction, and have decided to proceed with the procedure. Appropriate candidate for Sedation/Analgesia: Yes. Plan for Sedation reviewed: Yes. Explained Anesthesia options and attendant risks, and have determined patient is an appropriate candidate. Yes. Consent for Sedation obtained: Yes. Patient reevaluated immediately prior to Sedation/Analgesia: Yes.       Thank you for allowing our practice to participate in the care of your patient. Please do not hesitate to contact me if you have any questions.    Michael Ornelas MD  Interventional Cardiology  [facility]  Office: [phone]

## 2024-04-25 NOTE — PHYSICAL THERAPY NOTE
PHYSICAL THERAPY EVALUATION - INPATIENT     Room Number: EH IVS/EH IVS [facility]  Evaluation Date: 4/25/2024  Type of Evaluation: Initial  Physician Order: PT Eval and Treat    Presenting Problem: chest pain of uncertain etiology  Co-Morbidities : CAD, HTN, HLD, DMT2, BPH, CKD, nephrectomy  Reason for Therapy: Mobility Dysfunction and Discharge Planning    PHYSICAL THERAPY ASSESSMENT   Patient is currently functioning near baseline with transfers, gait, and standing prolonged periods.  Prior to admission, patient's baseline is he resides with his brother, is ambulatory Bhavya using his rolling walker and does not drive.  Patient is requiring contact guard assist as a result of the following impairments: decreased functional strength and decreased endurance/aerobic capacity.  Physical Therapy will continue to follow for duration of hospitalization.    Patient will benefit from continued skilled PT Services at discharge to promote functional independence and safety with additional support and return home with home health PT.    PLAN  PT Treatment Plan: Bed mobility;Body mechanics;Endurance;Energy conservation;Patient education;Family education;Gait training;Strengthening;Stair training;Transfer training;Balance training  Rehab Potential : Good  Frequency (Obs): 3-5x/week  Number of Visits to Meet Established Goals: 4      CURRENT GOALS    Goal #1 Patient is able to demonstrate supine - sit EOB @ level: independent     Goal #2 Patient is able to demonstrate transfers Sit to/from Stand at assistance level: modified independent     Goal #3 Patient is able to ambulate 150 feet with assist device: walker - rolling at assistance level: supervision     Goal #4    Goal #5    Goal #6    Goal Comments: Goals established on 4/25/2024      PHYSICAL THERAPY MEDICAL/SOCIAL HISTORY  History related to current admission: Patient is a 83 year old male admitted on 4/23/2024 from home for pre-procedure IVF hydration and exertional  shortness of breath.  Patient also found to have anemia s/p 1 unit PRBC's 24. Pt diagnosed with chest pain of uncertain etiology.      HOME SITUATION  Type of Home: House   Home Layout: Two level;Elevator;Ramped entrance                Lives With:  (lives with his brother and his family)  Drives: No  Patient Owned Equipment: Rolling walker;Wheelchair  Patient Regularly Uses: Glasses    Prior Level of [place]: History mostly provided from female visitor at bedside patient functions mostly Bhavya using his rolling walker at home.     SUBJECTIVE  Agreeable to working with therapy      OBJECTIVE  Precautions: Bed/chair alarm; needed (primary language is Italian)  Fall Risk: High fall risk    WEIGHT BEARING RESTRICTION  Weight Bearing Restriction: None                PAIN ASSESSMENT  Ratin  Location: patient denies       COGNITION  Following Commands:  follows all commands and directions without difficulty    RANGE OF MOTION AND STRENGTH ASSESSMENT  Upper extremity ROM and strength - defer to OT assessment    Lower extremity ROM is within functional limits     Lower extremity strength is within functional limits       BALANCE  Static Sitting: Good  Dynamic Sitting: Fair +  Static Standing: Fair  Dynamic Standing: Fair    ADDITIONAL TESTS                                    ACTIVITY TOLERANCE  Pulse: 59  Heart Rate Source: Monitor                   O2 WALK  Oxygen Therapy  SPO2% on Room Air at Rest: 98    NEUROLOGICAL FINDINGS                        AM-PAC '6-Clicks' INPATIENT SHORT FORM - BASIC MOBILITY  How much difficulty does the patient currently have...  Patient Difficulty: Turning over in bed (including adjusting bedclothes, sheets and blankets)?: A Little   Patient Difficulty: Sitting down on and standing up from a chair with arms (e.g., wheelchair, bedside commode, etc.): A Little   Patient Difficulty: Moving from lying on back to sitting on the side of the bed?: A Little   How much help from  another person does the patient currently need...   Help from Another: Moving to and from a bed to a chair (including a wheelchair)?: A Little   Help from Another: Need to walk in hospital room?: A Little   Help from Another: Climbing 3-5 steps with a railing?: A Lot       AM-PAC Score:  Raw Score: 17   Approx Degree of Impairment: 50.57%   Standardized Score (AM-PAC Scale): 42.13   CMS Modifier (G-Code): CK    FUNCTIONAL ABILITY STATUS  Gait Assessment   Functional Mobility/Gait Assessment  Gait Assistance: Contact guard assist  Distance (ft): 60  Assistive Device: Rolling walker  Pattern: Shuffle    Skilled Therapy Provided     Bed Mobility:  Rolling: NT  Supine to sit: NT   Sit to supine: NT     Transfer Mobility:  Sit to stand: CGA, RW   Stand to sit: CGA, RW  Gait = x60 ft., CGA, RW    Therapist's Comments: Patient presents to PT sitting up in the bedside chair, VSS on room air and agreeable to working with therapy. OT present as well for safe mobility. Patient performs transfers and short distance ambulation using a RW within the halls with CGA. Distance limited by increased fatigue with patient requesting return to his room, seated in the bedside chair with BLE's propped. Plan for heart cath later today. Would benefit from follow-up following his procedure to progress his functional strength, activity tolerance and functional independence with mobility and gait. RN updated.     Exercise/Education Provided:  Energy conservation  Functional activity tolerated  Gait training  Posture  Strengthening  Transfer training    Patient End of Session: Up in chair;Needs met;Call light within reach;RN aware of session/findings;All patient questions and concerns addressed;Alarm set;Family present      Patient Evaluation Complexity Level:  History Low - no personal factors and/or co-morbidities   Examination of body systems Low - addressing 1-2 elements   Clinical Presentation Low - Stable   Clinical Decision Making Low -  Stable       PT Session Time: 30 minutes  Gait Training: 15 minutes  Therapeutic Activity: 0 minutes  Neuromuscular Re-education: 0 minutes  Therapeutic Exercise: 0 minutes

## 2024-04-25 NOTE — PROGRESS NOTES
[facility]   part of [facility]     Nephrology Progress Note    Bonifacio Mccoy Patient Status:  Inpatient    1940 MRN [id number]   Location [facility] 8NE-A Attending Carlos Vaughn, DO   Hosp Day # 2 PCP Hiram Nugent MD       SUBJECTIVE:  Stable this AM, awaiting angiogram        Physical Exam:   /53 (BP Location: Right arm)   Pulse 58   Temp 98.8 °F (37.1 °C) (Oral)   Resp 17   Wt 174 lb 13.2 oz (79.3 kg)   SpO2 98%   BMI 29.09 kg/m²   Temp (24hrs), Av.1 °F (36.7 °C), Min:97.9 °F (36.6 °C), Max:98.8 °F (37.1 °C)       Intake/Output Summary (Last 24 hours) at 2024 1119  Last data filed at 2024 0900  Gross per 24 hour   Intake 846.25 ml   Output 1160 ml   Net -313.75 ml     Last 3 Weights   24 0900 174 lb 13.2 oz (79.3 kg)   24 1129 179 lb 1.6 oz (81.2 kg)   24 1128 179 lb 1.6 oz (81.2 kg)   24 1844 180 lb (81.6 kg)   23 1337 177 lb (80.3 kg)   22 1316 189 lb (85.7 kg)   21 1825 188 lb (85.3 kg)     General: Alert and oriented in no apparent distress.  HEENT: No scleral icterus, MMM  Neck: Supple, no ANA or thyromegaly  Cardiac: Regular rate and rhythm, S1, S2 normal, no murmur or rub  Lungs: Clear without wheezes, rales, rhonchi.    Abdomen: Soft, non-tender. + bowel sounds, no palpable organomegaly  Extremities: tr edema.  Neurologic: Alert and oriented, cranial nerves grossly intact, moving all extremities  Skin: Warm and dry, no rash        Labs:     Recent Labs   Lab 24  1237 24  1831 24  0539 24  1830 24  0546   WBC 8.1  --  7.5  --  9.3   HGB 7.7* 8.4* 7.7* 10.1* 10.2*   MCV 93.9  --  89.3  --  90.9   .0  --  187.0  --  210.0       Recent Labs   Lab 24  1237 24  0539 24  0546   * 128* 137   K 4.4 4.0 4.5   CL 96* 97* 106   CO2 26.0 24.0 27.0   BUN 63* 57* 53*   CREATSERUM 2.92* 2.62* 2.56*   CA 8.9 8.5 8.9   MG  --   --  2.2   * 114* 118*        No results for input(s): \"ALT\", \"AST\", \"ALB\", \"AMYLASE\", \"LIPASE\", \"LDH\" in the last 168 hours.    Invalid input(s): \"ALPHOS\", \"TBIL\", \"DBIL\", \"TPROT\"    Recent Labs   Lab 04/24/24  1500 04/24/24  1655 04/24/24  2102 04/25/24  0531 04/25/24  1017   PGLU 147* 205* 166* 105* 98       Meds:   Current Facility-Administered Medications   Medication Dose Route Frequency    sodium chloride 0.9% infusion   Intravenous Continuous    pantoprazole (Protonix) DR tab 40 mg  40 mg Oral BID AC    iodixanol (VISIPAQUE) 320 MG/ML injection 100 mL  100 mL Injection ONCE PRN    sodium chloride 0.9% infusion   Intravenous Once    atorvastatin (Lipitor) tab 80 mg  80 mg Oral Nightly    carvedilol (Coreg) tab 6.25 mg  6.25 mg Oral Daily    clopidogrel (Plavix) tab 75 mg  75 mg Oral Daily    ezetimibe (Zetia) tab 10 mg  10 mg Oral Nightly    tamsulosin (Flomax) cap 0.4 mg  0.4 mg Oral Daily    glucose (Dex4) 15 GM/59ML oral liquid 15 g  15 g Oral Q15 Min PRN    Or    glucose (Glutose) 40% oral gel 15 g  15 g Oral Q15 Min PRN    Or    glucose-vitamin C (Dex-4) chewable tab 4 tablet  4 tablet Oral Q15 Min PRN    Or    dextrose 50% injection 50 mL  50 mL Intravenous Q15 Min PRN    Or    glucose (Dex4) 15 GM/59ML oral liquid 30 g  30 g Oral Q15 Min PRN    Or    glucose (Glutose) 40% oral gel 30 g  30 g Oral Q15 Min PRN    Or    glucose-vitamin C (Dex-4) chewable tab 8 tablet  8 tablet Oral Q15 Min PRN    insulin aspart (NovoLOG) 100 Units/mL FlexPen 1-5 Units  1-5 Units Subcutaneous TID AC and HS    aspirin chewable tab 81 mg  81 mg Oral Daily    acetaminophen (Tylenol) tab 650 mg  650 mg Oral Q6H PRN         Impression/Plan:      #1.  CKD IV- due to DM/HTN in setting of solitary kidney.  Most recent creat 2.9 mg/dl or so.  D/w pt and dtr the risk of clinically sig JIMI incl risk of ESRD due to angiogram.  Suspect likelihood of JIMI on the order of 5-10% or so with risk of ESRD less than this and prob 1-5% or so at most.  NS today  alysa-procedure     #2.  HTN- cont usual med regimen     #3. MV CAD- in setting of angina.  PCI planned this afternoon    #4.  Anemia- likely at least in part due to CKD.  Pt has not been receiving CHASE as outpt.  Received retacrit 4/24.  Also had packed RBCs yest.  He is fe def as well and will give venofer    Questions/concerns were discussed with patient and/or family by bedside.        Michael Peterson MD  4/25/2024  11:19 AM

## 2024-04-25 NOTE — PLAN OF CARE
Received patient at 0730. Alert and Oriented x4. Irish speaking. Tele Rhythm NSR. Pt on RA. Breath sounds clear. Bed is locked and in low position. Call light and personal items within reach. No C/O chest pain or shortness of breath. Pt voiding with no issue. Pt ambulates with stand by assist and walker. IV NS infusing pre procedure. Consent completed in chart. Reviewed plan of care and patient verbalizes understanding.      Plan:  Angiogram    Problem: Diabetes/Glucose Control  Goal: Glucose maintained within prescribed range  Description: INTERVENTIONS:  - Monitor Blood Glucose as ordered  - Assess for signs and symptoms of hyperglycemia and hypoglycemia  - Administer ordered medications to maintain glucose within target range  - Assess barriers to adequate nutritional intake and initiate nutrition consult as needed  - Instruct patient on self management of diabetes  Outcome: Progressing     Problem: Patient/Family Goals  Goal: Patient/Family Long Term Goal  Description: Patient's Long Term Goal: stay out of hospital    Interventions:  - medication compliance   - see primary care physician   - follow up with cardiology  - See additional Care Plan goals for specific interventions  Outcome: Progressing  Goal: Patient/Family Short Term Goal  Description: Patient's Short Term Goal: go home    Interventions:   -  Angiogram 4/24  - GI consult   - nephrology consult   - See additional Care Plan goals for specific interventions  Outcome: Progressing     Problem: CARDIOVASCULAR - ADULT  Goal: Maintains optimal cardiac output and hemodynamic stability  Description: INTERVENTIONS:  - Monitor vital signs, rhythm, and trends  - Monitor for bleeding, hypotension and signs of decreased cardiac output  - Evaluate effectiveness of vasoactive medications to optimize hemodynamic stability  - Monitor arterial and/or venous puncture sites for bleeding and/or hematoma  - Assess quality of pulses, skin color and temperature  - Assess for  signs of decreased coronary artery perfusion - ex. Angina  - Evaluate fluid balance, assess for edema, trend weights  Outcome: Progressing  Goal: Absence of cardiac arrhythmias or at baseline  Description: INTERVENTIONS:  - Continuous cardiac monitoring, monitor vital signs, obtain 12 lead EKG if indicated  - Evaluate effectiveness of antiarrhythmic and heart rate control medications as ordered  - Initiate emergency measures for life threatening arrhythmias  - Monitor electrolytes and administer replacement therapy as ordered  Outcome: Progressing

## 2024-04-25 NOTE — PLAN OF CARE
Problem: CARDIOVASCULAR - ADULT  Goal: Maintains optimal cardiac output and hemodynamic stability  Description: INTERVENTIONS:  - Monitor vital signs, rhythm, and trends  - Monitor for bleeding, hypotension and signs of decreased cardiac output  - Evaluate effectiveness of vasoactive medications to optimize hemodynamic stability  - Monitor arterial and/or venous puncture sites for bleeding and/or hematoma  - Assess quality of pulses, skin color and temperature  - Assess for signs of decreased coronary artery perfusion - ex. Angina  - Evaluate fluid balance, assess for edema, trend weights  Outcome: Progressing  Goal: Absence of cardiac arrhythmias or at baseline  Description: INTERVENTIONS:  - Continuous cardiac monitoring, monitor vital signs, obtain 12 lead EKG if indicated  - Evaluate effectiveness of antiarrhythmic and heart rate control medications as ordered  - Initiate emergency measures for life threatening arrhythmias  - Monitor electrolytes and administer replacement therapy as ordered  Outcome: Progressing  Received from cath lab. Pt A&Ox4. Denies any cp or sob. R wrist TR band on at 12cc, site D/I/C, no hematoma noted. Support person at bedside. Plan of care explained. 12 lead EKG done as per protocol, no changes from previous. Fall precautions continued.   Will continue to monitor. Labs and meds as ordered. Monitor R wrist for any oozing or hematoma. Monitor labs and hgb. Continue fall precautions.

## 2024-04-25 NOTE — PROGRESS NOTES
[facility]  Consultation Note      Bonifacio Mccoy Patient Status:  Inpatient    1940 MRN [id number]   Location [facility] 8NE-A Attending Carlos Vaughn,    Hosp Day # 2 PCP Hiram Nugent MD     Reason for consult: CAD    Subjective: No CP or SOB. S/p PCI RCA LEXIE x 1. Used only 15 mL contrast.     Impression:  83 year old male   Exertional angina  Multivessel CAD, not surgical candidate  S/p PCI prox RCA LEXIE x 1 (24)  CKD 4  HTN  DM2  Anemia - GI has seen, no e/o bleeding, likely related to CKD    Recommendations:  Used only 15 ml contrast. I think renal function should stay stable. Gave 800 mL NS during procedure.   Continue ASA, plavix, statin, BB  Iron, CHASE per outpatient renal  He has residual disease in proximal LAD. Can bring him back for staged PCI once renal function stable.   Likely home tomorrow.     Primary cardiologist: Cornell Bryson MD     History of Present Illness:  Bonifacio Mccoy is a 83 year old male with known MV CAD from cath , deemed high risk for CABG, CKD 4 with baseline Cr ~ 2.9, HTN who presented to [facility] on 2024 for planned PCI tomorrow. He will have renal consultation and IVF today. He has been experiencing burning chest pain with exertion relieved by rest, sometimes at rest lying in bed. Currently no symptoms. There are no reports of dyspnea, palpitations, leg swelling, orthopnea, PND, lightheadedness, syncope, claudication, stroke/TIA symptoms, or any outward signs of bleeding.          Medications:  Current Facility-Administered Medications   Medication Dose Route Frequency    sodium chloride 0.9% infusion   Intravenous Continuous    iron sucrose (Venofer) 20 mg/mL injection 200 mg  200 mg Intravenous Daily    pantoprazole (Protonix) DR tab 40 mg  40 mg Oral BID AC    sodium chloride 0.9% infusion   Intravenous Once    atorvastatin (Lipitor) tab 80 mg  80 mg Oral Nightly    carvedilol (Coreg) tab 6.25 mg  6.25  mg Oral Daily    clopidogrel (Plavix) tab 75 mg  75 mg Oral Daily    ezetimibe (Zetia) tab 10 mg  10 mg Oral Nightly    tamsulosin (Flomax) cap 0.4 mg  0.4 mg Oral Daily    glucose (Dex4) 15 GM/59ML oral liquid 15 g  15 g Oral Q15 Min PRN    Or    glucose (Glutose) 40% oral gel 15 g  15 g Oral Q15 Min PRN    Or    glucose-vitamin C (Dex-4) chewable tab 4 tablet  4 tablet Oral Q15 Min PRN    Or    dextrose 50% injection 50 mL  50 mL Intravenous Q15 Min PRN    Or    glucose (Dex4) 15 GM/59ML oral liquid 30 g  30 g Oral Q15 Min PRN    Or    glucose (Glutose) 40% oral gel 30 g  30 g Oral Q15 Min PRN    Or    glucose-vitamin C (Dex-4) chewable tab 8 tablet  8 tablet Oral Q15 Min PRN    insulin aspart (NovoLOG) 100 Units/mL FlexPen 1-5 Units  1-5 Units Subcutaneous TID AC and HS    aspirin chewable tab 81 mg  81 mg Oral Daily    acetaminophen (Tylenol) tab 650 mg  650 mg Oral Q6H PRN       Past Medical History:    Arthritis    Congestive heart disease (HCC)    Diabetes (HCC)    Essential hypertension    High blood pressure    High cholesterol    History of blood clots    Muscle weakness    Renal disorder    Sleep apnea    non compliant with CPAP       Past Surgical History:   Procedure Laterality Date    Nephrectomy N/A     Other surgical history N/A     nephrectomy       Family History  family history is not on file.    Social History   reports that he has never smoked. He has never used smokeless tobacco. He reports that he does not drink alcohol and does not use drugs.     Allergies  Allergies   Allergen Reactions    Losartan UNKNOWN    Lisinopril ITCHING       Review of Systems:  As per HPI, otherwise 10 point ROS is negative in detail.    Physical Exam:  Blood pressure 134/58, pulse 70, temperature 98.2 °F (36.8 °C), temperature source Oral, resp. rate 21, weight 174 lb 13.2 oz (79.3 kg), SpO2 99%.  Temp (24hrs), Av.2 °F (36.8 °C), Min:97.9 °F (36.6 °C), Max:98.8 °F (37.1 °C)    Wt Readings from Last 3  Encounters:   04/25/24 174 lb 13.2 oz (79.3 kg)   04/02/24 180 lb (81.6 kg)   03/16/23 177 lb (80.3 kg)       General: Awake and alert; in no acute distress  HEENT: Extraocular movements are intact; sclerae are anicteric; scalp is atraumatic  Neck: Supple; no JVD; no carotid bruits  Cardiac: Regular rate and regular rhythm; normal S1 and S2, no murmurs, rubs, or gallops are appreciated  Lungs: Clear to auscultation bilaterally; no accessory muscle use is noted, no wheezes, rhonci or rales  Abdomen: Soft, non-distended, non-tender; bowel sounds are normoactive  Extremities: Warm, no edema, clubbing or cyanosis; moves all 4 extremities normally, distal pulses intact and equal  Psychiatric: Normal mood and affect; answers questions appropriately  Dermatologic: No rashes; normal skin turgor    Diagnostic testing:    Labs:   No results found for: \"PT\", \"INR\"     Lab Results   Component Value Date    WBC 9.3 04/25/2024    HGB 10.2 04/25/2024    HCT 30.1 04/25/2024    .0 04/25/2024    CREATSERUM 2.56 04/25/2024    BUN 53 04/25/2024     04/25/2024    K 4.5 04/25/2024     04/25/2024    CO2 27.0 04/25/2024     04/25/2024    CA 8.9 04/25/2024    MG 2.2 04/25/2024    PGLU 91 04/25/2024       Cardiac diagnostics:    Nuc stress 2/1/23  - Myocardial perfusion imaging: There is a small to moderate, mild,   reversible defect involving the basal and mid inferolateral wall(s),   consistent with ischemia.   - EF > 70%    EKG 8/19/22: SR 56 bpm, normal    Nuc stress 8/21: Normal perfusion    Echo 8/21  1. Left ventricle: The cavity size was normal. Wall thickness was normal.   Systolic function was normal. The estimated ejection fraction was 65-70%.   2. Mitral valve: Mildly calcified annulus.   3. Left atrium: The left atrium was mildly enlarged.     Informed consent:  Patient seen and examined independently. H and P reviewed. No changes in H and P. Risks and benefits of procedure were discussed with patient.  Airway examined.  Patient is ASA class 3 and Mallampati class 2. Pt is appropriate for conscious sedation. No history of difficult airway. The risks, benefits, indications and alternatives of left heart catheterization and coronary angigoraphy with possible percutaneous coronary intervention were discussed. The risks include, but are not limited to: bleeding, anemia requiring blood transfusion, allergic reaction, hypotension, infection, vascular injury, injury to upper or lower extremity requiring endovascular or open surgical repair,  kidney failure, stroke, cardiac or pulmonary artery perforation, pericardial effusion and tamponade requiring pericardiocentesis, myocardial infarction (heart attack), respiratory failure needing intubation, cardiac arrest, need for emergent surgery, and death. Benefits of the procedure include: symptomatic improvement, diagnosis of coronary artery disease or other forms of heart disease and possibly prevention of myocardial infarction. Alternatives to the procedure include: not performing cardiac catheterization, treatment with medications only, and observation. The patient and any accompanying family have considered the above, all questions have been answered to the best of my ability to their satisfaction, and have decided to proceed with the procedure. Appropriate candidate for Sedation/Analgesia: Yes. Plan for Sedation reviewed: Yes. Explained Anesthesia options and attendant risks, and have determined patient is an appropriate candidate. Yes. Consent for Sedation obtained: Yes. Patient reevaluated immediately prior to Sedation/Analgesia: Yes.       Thank you for allowing our practice to participate in the care of your patient. Please do not hesitate to contact me if you have any questions.    Michael Ornelas MD  Interventional Cardiology  [facility]  Office: [phone]

## 2024-04-26 VITALS
OXYGEN SATURATION: 97 % | SYSTOLIC BLOOD PRESSURE: 146 MMHG | DIASTOLIC BLOOD PRESSURE: 54 MMHG | RESPIRATION RATE: 20 BRPM | HEART RATE: 58 BPM | BODY MASS INDEX: 29 KG/M2 | WEIGHT: 171.94 LBS | TEMPERATURE: 98 F

## 2024-04-26 LAB
ANION GAP SERPL CALC-SCNC: 6 MMOL/L (ref 0–18)
ATRIAL RATE: 65 BPM
BUN BLD-MCNC: 44 MG/DL (ref 9–23)
CALCIUM BLD-MCNC: 8.5 MG/DL (ref 8.5–10.1)
CHLORIDE SERPL-SCNC: 107 MMOL/L (ref 98–112)
CO2 SERPL-SCNC: 23 MMOL/L (ref 21–32)
CREAT BLD-MCNC: 2.21 MG/DL
EGFRCR SERPLBLD CKD-EPI 2021: 29 ML/MIN/1.73M2 (ref 60–?)
ERYTHROCYTE [DISTWIDTH] IN BLOOD BY AUTOMATED COUNT: 15.2 %
GLUCOSE BLD-MCNC: 111 MG/DL (ref 70–99)
GLUCOSE BLD-MCNC: 118 MG/DL (ref 70–99)
GLUCOSE BLD-MCNC: 120 MG/DL (ref 70–99)
GLUCOSE BLD-MCNC: 140 MG/DL (ref 70–99)
HCT VFR BLD AUTO: 27.2 %
HGB BLD-MCNC: 9.4 G/DL
MCH RBC QN AUTO: 31.4 PG (ref 26–34)
MCHC RBC AUTO-ENTMCNC: 34.6 G/DL (ref 31–37)
MCV RBC AUTO: 91 FL
OSMOLALITY SERPL CALC.SUM OF ELEC: 294 MOSM/KG (ref 275–295)
P AXIS: 47 DEGREES
P-R INTERVAL: 150 MS
PLATELET # BLD AUTO: 173 10(3)UL (ref 150–450)
POTASSIUM SERPL-SCNC: 4.2 MMOL/L (ref 3.5–5.1)
Q-T INTERVAL: 398 MS
QRS DURATION: 78 MS
QTC CALCULATION (BEZET): 413 MS
R AXIS: 15 DEGREES
RBC # BLD AUTO: 2.99 X10(6)UL
SODIUM SERPL-SCNC: 136 MMOL/L (ref 136–145)
T AXIS: 41 DEGREES
VENTRICULAR RATE: 65 BPM
WBC # BLD AUTO: 8.5 X10(3) UL (ref 4–11)

## 2024-04-26 PROCEDURE — 97116 GAIT TRAINING THERAPY: CPT

## 2024-04-26 PROCEDURE — 85027 COMPLETE CBC AUTOMATED: CPT | Performed by: HOSPITALIST

## 2024-04-26 PROCEDURE — 82962 GLUCOSE BLOOD TEST: CPT

## 2024-04-26 PROCEDURE — 99211 OFF/OP EST MAY X REQ PHY/QHP: CPT

## 2024-04-26 PROCEDURE — 80048 BASIC METABOLIC PNL TOTAL CA: CPT | Performed by: HOSPITALIST

## 2024-04-26 RX ORDER — ASPIRIN 81 MG/1
81 TABLET, CHEWABLE ORAL DAILY
Qty: 30 TABLET | Refills: 0 | Status: SHIPPED | OUTPATIENT
Start: 2024-04-27

## 2024-04-26 NOTE — PROGRESS NOTES
[facility]   part of [facility]     Nephrology Progress Note    Bonifacio Mccoy Patient Status:  Inpatient    1940 MRN [id number]   Location [facility] 8NE-A Attending Carlos Vaughn, DO   Hosp Day # 3 PCP Hiram Nugent MD       SUBJECTIVE:  Denies c/o this afternoon        Physical Exam:   /56 (BP Location: Right arm)   Pulse 58   Temp 98.4 °F (36.9 °C) (Oral)   Resp 17   Wt 171 lb 15.3 oz (78 kg)   SpO2 98%   BMI 28.62 kg/m²   Temp (24hrs), Av.6 °F (37 °C), Min:98.2 °F (36.8 °C), Max:99.1 °F (37.3 °C)       Intake/Output Summary (Last 24 hours) at 2024 1246  Last data filed at 2024 1203  Gross per 24 hour   Intake 400 ml   Output 700 ml   Net -300 ml     Last 3 Weights   24 0530 171 lb 15.3 oz (78 kg)   24 0900 174 lb 13.2 oz (79.3 kg)   24 1129 179 lb 1.6 oz (81.2 kg)   24 1128 179 lb 1.6 oz (81.2 kg)   24 1844 180 lb (81.6 kg)   23 1337 177 lb (80.3 kg)   22 1316 189 lb (85.7 kg)   21 1825 188 lb (85.3 kg)     General: Alert and oriented in no apparent distress.  HEENT: No scleral icterus, MMM  Neck: Supple, no ANA or thyromegaly  Cardiac: Regular rate and rhythm, S1, S2 normal, no murmur or rub  Lungs: Clear without wheezes, rales, rhonchi.    Abdomen: Soft, non-tender. + bowel sounds, no palpable organomegaly  Extremities: tr edema.  Neurologic: Alert and oriented, cranial nerves grossly intact, moving all extremities  Skin: Warm and dry, no rash        Labs:     Recent Labs   Lab 24  1237 24  1831 24  0539 24  1830 24  0546 24  0426   WBC 8.1  --  7.5  --  9.3 8.5   HGB 7.7* 8.4* 7.7* 10.1* 10.2* 9.4*   MCV 93.9  --  89.3  --  90.9 91.0   .0  --  187.0  --  210.0 173.0       Recent Labs   Lab 24  1237 24  0539 24  0546 24  0426   * 128* 137 136   K 4.4 4.0 4.5 4.2   CL 96* 97* 106 107   CO2 26.0 24.0 27.0 23.0   BUN 63* 57* 53* 44*    CREATSERUM 2.92* 2.62* 2.56* 2.21*   CA 8.9 8.5 8.9 8.5   MG  --   --  2.2  --    * 114* 118* 111*       No results for input(s): \"ALT\", \"AST\", \"ALB\", \"AMYLASE\", \"LIPASE\", \"LDH\" in the last 168 hours.    Invalid input(s): \"ALPHOS\", \"TBIL\", \"DBIL\", \"TPROT\"    Recent Labs   Lab 04/25/24  1230 04/25/24  1601 04/25/24  2101 04/26/24  0531 04/26/24  1203   PGLU 167* 91 185* 118* 140*       Meds:   Current Facility-Administered Medications   Medication Dose Route Frequency    iron sucrose (Venofer) 20 mg/mL injection 200 mg  200 mg Intravenous Daily    pantoprazole (Protonix) DR tab 40 mg  40 mg Oral BID AC    sodium chloride 0.9% infusion   Intravenous Once    atorvastatin (Lipitor) tab 80 mg  80 mg Oral Nightly    carvedilol (Coreg) tab 6.25 mg  6.25 mg Oral Daily    clopidogrel (Plavix) tab 75 mg  75 mg Oral Daily    ezetimibe (Zetia) tab 10 mg  10 mg Oral Nightly    tamsulosin (Flomax) cap 0.4 mg  0.4 mg Oral Daily    glucose (Dex4) 15 GM/59ML oral liquid 15 g  15 g Oral Q15 Min PRN    Or    glucose (Glutose) 40% oral gel 15 g  15 g Oral Q15 Min PRN    Or    glucose-vitamin C (Dex-4) chewable tab 4 tablet  4 tablet Oral Q15 Min PRN    Or    dextrose 50% injection 50 mL  50 mL Intravenous Q15 Min PRN    Or    glucose (Dex4) 15 GM/59ML oral liquid 30 g  30 g Oral Q15 Min PRN    Or    glucose (Glutose) 40% oral gel 30 g  30 g Oral Q15 Min PRN    Or    glucose-vitamin C (Dex-4) chewable tab 8 tablet  8 tablet Oral Q15 Min PRN    insulin aspart (NovoLOG) 100 Units/mL FlexPen 1-5 Units  1-5 Units Subcutaneous TID AC and HS    aspirin chewable tab 81 mg  81 mg Oral Daily    acetaminophen (Tylenol) tab 650 mg  650 mg Oral Q6H PRN         Impression/Plan:      #1.  CKD IV- due to DM/HTN in setting of solitary kidney.  Most recent creat 2.9 mg/dl or so.  D/w pt and dtr the risk of clinically sig JIMI incl risk of ESRD due to angiogram which was done 4/25.  He remains stable and has outpt nephrology appt next week      #2.  HTN- cont usual med regimen     #3. MV CAD- s/p PCI and will likely need another procedure.  Mgmt per cards    #4.  Anemia- likely at least in part due to CKD.  Pt has not been receiving CHASE as outpt.  Received retacrit 4/24.  Also had packed RBCs yest.  He is fe def as well and will give venofer    Questions/concerns were discussed with patient and/or family by bedside.    No objection to home from nephrology standpoint      Michael Peterson MD  4/26/2024  12:47 PM

## 2024-04-26 NOTE — PLAN OF CARE
NURSING DISCHARGE NOTE    Discharged Home via Wheelchair.  Accompanied by Spouse  Belongings Taken by patient/family.      Discharge instructions reviewed with patient and family member. New medications and follow up appointments reviewed. Post cath instructions reviewed. IV removed. Tele removed. All questions answered at this time. RN wheeled patient to vehicle.            Problem: Diabetes/Glucose Control  Goal: Glucose maintained within prescribed range  Description: INTERVENTIONS:  - Monitor Blood Glucose as ordered  - Assess for signs and symptoms of hyperglycemia and hypoglycemia  - Administer ordered medications to maintain glucose within target range  - Assess barriers to adequate nutritional intake and initiate nutrition consult as needed  - Instruct patient on self management of diabetes  4/26/2024 1821 by Mallory Carson RN  Outcome: Progressing  4/26/2024 1131 by Mallory Carson RN  Outcome: Progressing     Problem: Patient/Family Goals  Goal: Patient/Family Long Term Goal  Description: Patient's Long Term Goal: stay out of hospital    Interventions:  - medication compliance   - see primary care physician   - follow up with cardiology  - See additional Care Plan goals for specific interventions  4/26/2024 1821 by Mallory Carson RN  Outcome: Progressing  4/26/2024 1131 by Mallory Carson RN  Outcome: Progressing  Goal: Patient/Family Short Term Goal  Description: Patient's Short Term Goal: go home    Interventions:   -  Angiogram 4/24  - GI consult   - nephrology consult   - See additional Care Plan goals for specific interventions  4/26/2024 1821 by Mallory Carson RN  Outcome: Progressing  4/26/2024 1131 by Mallory Carson RN  Outcome: Progressing     Problem: CARDIOVASCULAR - ADULT  Goal: Maintains optimal cardiac output and hemodynamic stability  Description: INTERVENTIONS:  - Monitor vital signs, rhythm, and trends  - Monitor for bleeding, hypotension and signs of decreased cardiac output  -  Evaluate effectiveness of vasoactive medications to optimize hemodynamic stability  - Monitor arterial and/or venous puncture sites for bleeding and/or hematoma  - Assess quality of pulses, skin color and temperature  - Assess for signs of decreased coronary artery perfusion - ex. Angina  - Evaluate fluid balance, assess for edema, trend weights  4/26/2024 1821 by Mallory Carson RN  Outcome: Progressing  4/26/2024 1131 by Mallory Carson RN  Outcome: Progressing  Goal: Absence of cardiac arrhythmias or at baseline  Description: INTERVENTIONS:  - Continuous cardiac monitoring, monitor vital signs, obtain 12 lead EKG if indicated  - Evaluate effectiveness of antiarrhythmic and heart rate control medications as ordered  - Initiate emergency measures for life threatening arrhythmias  - Monitor electrolytes and administer replacement therapy as ordered  4/26/2024 1821 by Mallory Carson RN  Outcome: Progressing  4/26/2024 1131 by Mallory Carson, RN  Outcome: Progressing

## 2024-04-26 NOTE — CM/SW NOTE
04/26/24 1500   Discharge disposition   Expected discharge disposition Home-Health   Post Acute Care Provider   ([facility])   Discharge transportation Private car       Anticipate discharge today. Requested RN to fax AVS when available to [facility] [phone].    Ewa Sifuentes, MSW, LSW  Discharge Planner  [id number]

## 2024-04-26 NOTE — CARDIAC REHAB
CAD education completed.  Stent card given.  Education done with patient's son and daughter.  Pt to return for additional stent and will make cardiac rehab appt at that time.

## 2024-04-26 NOTE — CM/SW NOTE
Chart reviewed for discharge needs. PT anticipates returning home w/ HHC at discharge. SW sent HH referrals via [employer]. Orders entered.     Will provide choice list once available.     AMANDA Taveras, LSW  Discharge Planner  [id number]

## 2024-04-26 NOTE — PHYSICAL THERAPY NOTE
PHYSICAL THERAPY TREATMENT NOTE - INPATIENT    Room Number: 8621/8621-A     Session: 1     Number of Visits to Meet Established Goals: 4    Presenting Problem: chest pain of uncertain etiology  Co-Morbidities : CAD, HTN, HLD, DMT2, BPH, CKD, nephrectomy      PHYSICAL THERAPY MEDICAL/SOCIAL HISTORY  History related to current admission: Patient is a 83 year old male admitted on 4/23/2024 from home for pre-procedure IVF hydration and exertional shortness of breath.  Patient also found to have anemia s/p 1 unit PRBC's 4/24/24. Pt diagnosed with chest pain of uncertain etiology. Pt underwent cardiac cath 4/25/24        HOME SITUATION  Type of Home: House   Home Layout: Two level;Elevator;Ramped entrance     Lives With:  (lives with his brother and his family)  Drives: No  Patient Owned Equipment: Rolling walker;Wheelchair  Patient Regularly Uses: Glasses     Prior Level of [place]: History mostly provided from female visitor at bedside patient functions mostly Bhavya using his rolling walker at home.   ASSESSMENT   Patient demonstrates good  progress this session, goals  remain in progress.    Patient continues to function near baseline with bed mobility, transfers, gait, and standing prolonged periods.  Contributing factors to remaining limitations include decreased functional strength, decreased endurance/aerobic capacity, pain, and impaired standing balance.  Next session anticipate patient to progress bed mobility, transfers, and gait.  Physical Therapy will continue to follow patient for duration of hospitalization.    Patient continues to benefit from continued skilled PT services: at discharge to promote functional independence and safety with additional support and return home with home health PT.    PLAN  PT Treatment Plan: Bed mobility;Body mechanics;Endurance;Energy conservation;Patient education;Family education;Gait training;Strengthening;Stair training;Transfer training;Balance training  Rehab  Potential : Good  Frequency (Obs): 3-5x/week    CURRENT GOALS     Goal #1 Patient is able to demonstrate supine - sit EOB @ level: independent      Goal #2 Patient is able to demonstrate transfers Sit to/from Stand at assistance level: modified independent      Goal #3 Patient is able to ambulate 150 feet with assist device: walker - rolling at assistance level: supervision      Goal #4     Goal #5     Goal #6     Goal Comments: Goals established on 4/25/2024 4/26/2024 all goals ongoing     SUBJECTIVE  Pt states his knees hurt when walking.    OBJECTIVE  Precautions: Bed/chair alarm; needed (primary language is Setswana)    WEIGHT BEARING RESTRICTION  Weight Bearing Restriction: None                PAIN ASSESSMENT   Rating: Unable to rate  Location: knees       BALANCE                                                                                                                       Static Sitting: Good  Dynamic Sitting: Fair +           Static Standing: Fair  Dynamic Standing: Fair -    ACTIVITY TOLERANCE           BP: 134/53  BP Location: Right arm  BP Method: Automatic  Patient Position: Semi-Tom    O2 WALK  Oxygen Therapy  SPO2% on Room Air at Rest: 98      AM-PAC '6-Clicks' INPATIENT SHORT FORM - BASIC MOBILITY  How much difficulty does the patient currently have...  Patient Difficulty: Turning over in bed (including adjusting bedclothes, sheets and blankets)?: None   Patient Difficulty: Sitting down on and standing up from a chair with arms (e.g., wheelchair, bedside commode, etc.): A Little   Patient Difficulty: Moving from lying on back to sitting on the side of the bed?: A Little   How much help from another person does the patient currently need...   Help from Another: Moving to and from a bed to a chair (including a wheelchair)?: A Little   Help from Another: Need to walk in hospital room?: A Little   Help from Another: Climbing 3-5 steps with a railing?: A Lot       AM-PAC Score:  Raw Score:  18   Approx Degree of Impairment: 46.58%   Standardized Score (AM-PAC Scale): 43.63   CMS Modifier (G-Code): CK    FUNCTIONAL ABILITY STATUS  Gait Assessment   Functional Mobility/Gait Assessment  Gait Assistance: Contact guard assist  Distance (ft): 85  Assistive Device: Rolling walker  Pattern: Shuffle    Skilled Therapy Provided    Bed Mobility:   Supine<>Sit: supervision   Sit<>Supine: NT     Transfer Mobility:  Sit<>Stand: supervision   Stand<>Sit: supervision   Gait: Pt ambulated 85' with RW and CGA/SBA. Cued for more upright posture. Pt reported knee pain (chronic from arthritis) with walking.    Therapist's Comments: Pt sleeping; dtr at bedside and gave okay to wake pt for PT session at this time.       THERAPEUTIC EXERCISES  Lower Extremity Ankle pumps  Hip AB/AD  LAQ     Upper Extremity      Position Sitting     Repetitions   10   Sets   1     Patient End of Session: Up in chair;Needs met;Call light within reach;RN aware of session/findings;All patient questions and concerns addressed;Family present    PT Session Time: 19 minutes  Gait Trainin minutes    Therapeutic Exercise: 7 minutes

## 2024-04-26 NOTE — PLAN OF CARE
Pt A&Ox4. Family at bedside. R radial site CDI. JACQUE. NSR/SB on tele. Lungs clear on RA. Pt currently up in chair. Plan for possible discharge today. All needs addressed at this time. Call light and belongings within reach.        Problem: Diabetes/Glucose Control  Goal: Glucose maintained within prescribed range  Description: INTERVENTIONS:  - Monitor Blood Glucose as ordered  - Assess for signs and symptoms of hyperglycemia and hypoglycemia  - Administer ordered medications to maintain glucose within target range  - Assess barriers to adequate nutritional intake and initiate nutrition consult as needed  - Instruct patient on self management of diabetes  Outcome: Progressing     Problem: Patient/Family Goals  Goal: Patient/Family Long Term Goal  Description: Patient's Long Term Goal: stay out of hospital    Interventions:  - medication compliance   - see primary care physician   - follow up with cardiology  - See additional Care Plan goals for specific interventions  Outcome: Progressing  Goal: Patient/Family Short Term Goal  Description: Patient's Short Term Goal: go home    Interventions:   -  Angiogram 4/24  - GI consult   - nephrology consult   - See additional Care Plan goals for specific interventions  Outcome: Progressing     Problem: CARDIOVASCULAR - ADULT  Goal: Maintains optimal cardiac output and hemodynamic stability  Description: INTERVENTIONS:  - Monitor vital signs, rhythm, and trends  - Monitor for bleeding, hypotension and signs of decreased cardiac output  - Evaluate effectiveness of vasoactive medications to optimize hemodynamic stability  - Monitor arterial and/or venous puncture sites for bleeding and/or hematoma  - Assess quality of pulses, skin color and temperature  - Assess for signs of decreased coronary artery perfusion - ex. Angina  - Evaluate fluid balance, assess for edema, trend weights  Outcome: Progressing  Goal: Absence of cardiac arrhythmias or at baseline  Description:  INTERVENTIONS:  - Continuous cardiac monitoring, monitor vital signs, obtain 12 lead EKG if indicated  - Evaluate effectiveness of antiarrhythmic and heart rate control medications as ordered  - Initiate emergency measures for life threatening arrhythmias  - Monitor electrolytes and administer replacement therapy as ordered  Outcome: Progressing

## 2024-04-26 NOTE — PAYOR COMM NOTE
--------------  4/25:  CONTINUED STAY REVIEW    Payor: [facility]  Subscriber #:  [id number]  Authorization Number: [id number]    Admit date: 4/23/24  Admit time: 10:39 AM        OPERATIVE REPORT - CARDIAC CATHETERIZATION     Date of Procedure: 4/25/2024      Performing Physician: Michael Ornelas MD     Pre-Procedure Diagnosis:   1. CAD  2. Unstable angina     Post-Procedure Diagnosis:  1. Same as pre     Findings:  1. Left main: No stenosis  2. LAD: Proximal 75% diffuse moderately calcified plaque, short segment mid intramyocardial with mild systolic compression, several small diagonal branches  3. Left circumflex: Gives off high OM1 with 75% proximal plaquing, then  prox LCX.  4. RCA: Proximal to mid diffuse severe disease, worst > 90%. PDA and RPL luminal irregularities. Distal RPL supplies grade 3 collaterals to LCX backfilling to proximal OM.   5. Hemodynamics: LVEDP 10 mmHg. No LV-Ao gradient on pullback.      Conclusions / Recommendations:  1. Severe multivessel CAD, some progression since last angiogram from March 2023. We treated the more critical stenosis in the RCA today, which also supplies collaterals to the occluded LCX. The LAD has stably severe disease.    2. S/p IVUS guided PCI of prox-mid RCA with LEXIE x 1 (3.0 x 38 mm Synergy), post dilated 3.5 NC high pressure.   3. We used only 15 ml contrast, relying mostly on landmark and IVUS rather than angiography. I think his renal function should stay stable. We gave another 800 ml NS during the procedure as his LVEDP was normal ~ 10 mmHg.   4. Consider bringing him back after a month to treat the LAD. The OM1 is more of a medium sized branch and could be left for medical therapy if he is not suffering debilitating angina. .   5. Remove the TR band per protocol  6. Continue DAPT, statin, medical therapy     -----------------------     Procedures performed:   1. Left heart catheterization  2. Selective bilateral coronary angiography  3.  Moderate sedation  4. Access of right radial artery     Indications: This is a 83 year old male presenting for left heart catheterization with coronary angiography to evaluate for obstructive CAD.      Description of Procedure: Informed consent was obtained from the patient in writing. The risks and benefits of the procedure were reviewed in detail with the patient, including but not limited to the risk of myocardial infarction, stroke, renal failure, bleeding, and death. After a thorough discussion of these risks and benefits, the patient agreed to proceed and signed an informed consent document. The patient was brought to the cardiac catheterization laboratory in the fasting state. Moderate sedation was employed using a total of IV Versed 1 mg and IV fentanyl 25 mcg in divided doses.  I directly observed the patient from 1435 to 1535, and an independent trained observer was present and assisted in the monitoring of the patient's level of consciousness and physiological status, heart rate, blood pressure, oximetry, and rhythm. All operators present for the case performed standard pre-procedural prep including hand washing, sterile gloves, gown, mask, and cap. All aspects of the maximum sterile barrier technique were followed. A preprocedural time out was performed with all physicians, technologists, and nurses involved with the procedure. 1% lidocaine was infiltrated subcutaneously in the right wrist for local anesthesia. Access was obtained in the right radial artery with a 5/6F Slender Glidesheath using the Seldinger technique and a micropuncture needle with a single anterior wall puncture. Standard vasodilator cocktail was given with nitroglycerin 200 mcg and verapamil 2.5 mg through the arterial sheath. Weight based heparin given to target therapeutic ACT and was monitored routinely throughout the case with additional heparin given as needed to keep ACT in therapeutic range at least > 250 sec. A 6F TIG catheter  was advanced over a J tipped wire through the arterial sheath and placed in the aortic root, then used to selectively engage the left coronary artery. In an effort to conserve contrast, (patient extremely fearful of needing HD), we took only one cranial view to see the LAD, and this appeared stable from his angio from 3/23. The catheter was then prolapsed into the LV over a wire and placed at the base of the LV. LV systolic and end diastolic pressure was then recorded. The catheter was pulled back and pullback measurements of LV and aortic pressure and recorded. The catheter was then removed over a wire and exchanged over a wire for a 6FJR4 guide and the RCA was then engaged and we took a limited angiogram of the RCA showing mostly stable, perhaps some progression of disease. From here I tried to conserve contrast and wired the vessel with a Stefano blue, then predilated with a 2.0 x 30 mm balloon. IVUS would not pass, so I predilated again with a 3.0 x 20 mm balloon. IVUS again would not pass, so I tried to place a 3.0 x 38 mm Synergy stent, however again, it would not make the first bend. With the help of a Guideliner, I was then able to position the stent, and then deployed this at 14 omari, treating essentially up to the ostium. I then post dilated the length of the stent, staying within the stent edges with a 3.5 NC to 18 omari. I performed IVUS again, showing a focal segment of underexpansion in the proximal stent, so I again post dilated this with a 3.5 NC up to 24 omari, again staying within the stent edge, with full expansion of the balloon. We took one final angiogram with the wire out showing good stent expansion and apposition, JOANNA 3 flow, no dissection or perforation. At the conclusion of the procedure, all catheters and wires were removed over a wire. The arterial sheath was removed and hemostasis achieved with standard TR band using patent hemostasis technique. There was no bleeding or hematoma. The patient  tolerated the procedure well without complication. EBL <10 mL. Total contrast ~ 15 mL mL. See procedure log for fluoro time and radiation dose.       Michael Ornelas MD  Interventional Cardiology  [facility]        4/25 CARDIOLOGY:     Reason for consult: CAD     Subjective: No CP or SOB. S/p PCI RCA LEXIE x 1. Used only 15 mL contrast.      Impression:  83 year old male   Exertional angina  Multivessel CAD, not surgical candidate  S/p PCI prox RCA LEXIE x 1 (4/25/24)  CKD 4  HTN  DM2  Anemia - GI has seen, no e/o bleeding, likely related to CKD     Recommendations:  Used only 15 ml contrast. I think renal function should stay stable. Gave 800 mL NS during procedure.   Continue ASA, plavix, statin, BB  Iron, CHASE per outpatient renal  He has residual disease in proximal LAD. Can bring him back for staged PCI once renal function stable.   Likely home tomorrow.      Primary cardiologist: Cornell Bryson MD      History of Present Illness:  Bonifacio Mccoy is a 83 year old male with known MV CAD from cath 2/23, deemed high risk for CABG, CKD 4 with baseline Cr ~ 2.9, HTN who presented [facility] 4/[facility]/2024 for planned PCI tomorrow. He will have renal consultation and IVF today. He has been experiencing burning chest pain with exertion relieved by rest, sometimes at rest lying in bed. Currently no symptoms. There are no reports of dyspnea, palpitations, leg swelling, orthopnea, PND, lightheadedness, syncope, claudication, stroke/TIA symptoms, or any outward signs of bleeding.                 MEDICATIONS ADMINISTERED IN LAST 1 DAY:  aspirin chewable tab 81 mg       Date Action Dose Route User    4/25/2024 0754 Given 81 mg Oral Annia Bender RN          atorvastatin (Lipitor) tab 80 mg       Date Action Dose Route User    4/25/2024 2050 Given 80 mg Oral Dang Bell RN          carvedilol (Coreg) tab 6.25 mg       Date Action Dose Route User    4/25/2024 0754 Given 6.25 mg Oral Napoleon  JUNIOR Milner          clopidogrel (Plavix) tab 75 mg       Date Action Dose Route User    4/25/2024 0754 Given 75 mg Oral Annia Bender RN          ezetimibe (Zetia) tab 10 mg       Date Action Dose Route User    4/25/2024 2050 Given 10 mg Oral Dang Bell RN          insulin aspart (NovoLOG) 100 Units/mL FlexPen 1-5 Units       Date Action Dose Route User    4/25/2024 2104 Given 2 Units Subcutaneous (Right Upper Arm) Dang Bell RN          iodixanol (VISIPAQUE) 320 MG/ML injection 100 mL       Date Action Dose Route User    4/25/2024 1555 Given 50 mL Injection Michael Ornelas MD          iron sucrose (Venofer) 20 mg/mL injection 200 mg       Date Action Dose Route User    4/25/2024 1145 New Bag 200 mg Intravenous Annia Bender RN          pantoprazole (Protonix) DR tab 40 mg       Date Action Dose Route User    4/25/2024 1701 Given 40 mg Oral Dang Bell RN          sodium chloride 0.9% infusion       Date Action Dose Route User    4/25/2024 1103 New Bag (none) Intravenous Annia Bender RN          tamsulosin (Flomax) cap 0.4 mg       Date Action Dose Route User    4/25/2024 0754 Given 0.4 mg Oral Annia Bender RN            Vitals (last day)       Date/Time Temp Pulse Resp BP SpO2 Weight O2 Device O2 Flow Rate (L/min) [facility]    04/26/24 0530 98.2 °F (36.8 °C) 67 23 139/52 93 % 171 lb 15.3 oz None (Room air) 0 L/min MM    04/25/24 2340 98.6 °F (37 °C) 75 21 141/54 98 % -- None (Room air) 0 L/min MM    04/25/24 1607 98.5 °F (36.9 °C) 66 19 128/59 97 % -- None (Room air) 0 L/min JJ    04/25/24 1233 -- -- -- -- -- -- None (Room air) -- KR    04/25/24 1233 98.2 °F (36.8 °C) 70 21 134/58 99 % -- -- --     04/25/24 0928 -- 59 -- -- -- -- -- -- AK    04/25/24 0900 -- 58 17 -- 98 % 174 lb 13.2 oz -- --     04/25/24 0800 98.8 °F (37.1 °C) 66 16 137/53 99 % -- None (Room air) --     04/25/24 0530 97.9 °F (36.6 °C) 63 17 144/56 99 % -- None (Room air) 0 L/min MM               Blood Transfusion Record       Product Unit Status Volume Start End            Transfuse RBC       24  [id number]  [id number] Stopped 376.25 mL 04/24/24 1406 04/24/24 1712

## 2024-04-26 NOTE — DISCHARGE SUMMARY
[facility] Discharge Summary    Patient ID  Bonifacio Mccoy  [id number]  83 year old  9/30/1940    Admit date: 4/23/2024    Discharge date: 04/26/24    Attending: Carlos Vaughn DO     Primary Care Physician: Hiram Nugent MD      Reason for admission: chest pain    Discharge condition: stable    Disposition: home    Important follow up:  -PCP within 7d  -specialists:  cards / renal as directed       Additional patient instructions       Discharge med list     Medication List        ASK your doctor about these medications      allopurinol 300 MG Tabs  Commonly known as: Zyloprim     atorvastatin 80 MG Tabs  Commonly known as: Lipitor  Ask about: Which instructions should I use?     calcitriol 0.25 MCG Caps  Commonly known as: Rocaltrol     carvedilol 6.25 MG Tabs  Commonly known as: Coreg     cholecalciferol 25 MCG (1000 UT) Tabs     clopidogrel 75 MG Tabs  Commonly known as: Plavix  Ask about: Which instructions should I use?     ezetimibe 10 MG Tabs  Commonly known as: Zetia     insulin glargine 100 UNIT/ML Soln  Commonly known as: Lantus  Ask about: Which instructions should I use?     losartan 25 MG Tabs  Commonly known as: Cozaar     Omeprazole 40 MG Cpdr     potassium chloride 20 MEQ Pack  Commonly known as: Klor-Con     tamsulosin 0.4 MG Caps  Commonly known as: Flomax  Ask about: Which instructions should I use?     torsemide 20 MG Tabs  Commonly known as: Demadex  Ask about: Which instructions should I use?              Discharge Diagnoses:    -CAD with exertional angina  -HTN  -HLD     -CKD 4 with hx of nephrectomy  -hyponatremia, chronic  -DM 2     Anemia      Consults:  IP CONSULT TO SOCIAL WORK  IP CONSULT TO NEPHROLOGY  IP CONSULT TO VASCULAR ACCESS TEAM  IP CONSULT TO GASTROENTEROLOGY  IP CONSULT TO SOCIAL WORK    Radiology:  CATH ANGIO    Result Date: 4/25/2024  This exam has been completed. Please refer to Notes for the results to this procedure.    CATH ANGIO    Result Date:  4/25/2024  Michael Ornelas MD     4/25/2024  4:02 PM OPERATIVE REPORT - CARDIAC CATHETERIZATION Date of Procedure: 4/25/2024 Performing Physician: Michael Ornelas MD Pre-Procedure Diagnosis: 1. CAD 2. Unstable angina Post-Procedure Diagnosis: 1. Same as pre Findings: 1. Left main: No stenosis 2. LAD: Proximal 75% diffuse moderately calcified plaque, short segment mid intramyocardial with mild systolic compression, several small diagonal branches 3. Left circumflex: Gives off high OM1 with 75% proximal plaquing, then  prox LCX. 4. RCA: Proximal to mid diffuse severe disease, worst > 90%. PDA and RPL luminal irregularities. Distal RPL supplies grade 3 collaterals to LCX backfilling to proximal OM. 5. Hemodynamics: LVEDP 10 mmHg. No LV-Ao gradient on pullback. Conclusions / Recommendations: 1. Severe multivessel CAD, some progression since last angiogram from March 2023. We treated the more critical stenosis in the RCA today, which also supplies collaterals to the occluded LCX. The LAD has stably severe disease.  2. S/p IVUS guided PCI of prox-mid RCA with LEXIE x 1 (3.0 x 38 mm Synergy), post dilated 3.5 NC high pressure. 3. We used only 15 ml contrast, relying mostly on landmark and IVUS rather than angiography. I think his renal function should stay stable. We gave another 800 ml NS during the procedure as his LVEDP was normal ~ 10 mmHg. 4. Consider bringing him back after a month to treat the LAD. The OM1 is more of a medium sized branch and could be left for medical therapy if he is not suffering debilitating angina. . 5. Remove the TR band per protocol 6. Continue DAPT, statin, medical therapy ----------------------- Procedures performed: 1. Left heart catheterization 2. Selective bilateral coronary angiography 3. Moderate sedation 4. Access of right radial artery Indications: This is a 83 year old male presenting for left heart catheterization with coronary angiography to evaluate for obstructive CAD. Description  of Procedure: Informed consent was obtained from the patient in writing. The risks and benefits of the procedure were reviewed in detail with the patient, including but not limited to the risk of myocardial infarction, stroke, renal failure, bleeding, and death. After a thorough discussion of these risks and benefits, the patient agreed to proceed and signed an informed consent document. The patient was brought to the cardiac catheterization laboratory in the fasting state. Moderate sedation was employed using a total of IV Versed 1 mg and IV fentanyl 25 mcg in divided doses.  I directly observed the patient from 1435 to 1535, and an independent trained observer was present and assisted in the monitoring of the patient's level of consciousness and physiological status, heart rate, blood pressure, oximetry, and rhythm. All operators present for the case performed standard pre-procedural prep including hand washing, sterile gloves, gown, mask, and cap. All aspects of the maximum sterile barrier technique were followed. A preprocedural time out was performed with all physicians, technologists, and nurses involved with the procedure. 1% lidocaine was infiltrated subcutaneously in the right wrist for local anesthesia. Access was obtained in the right radial artery with a 5/6F Slender Glidesheath using the Seldinger technique and a micropuncture needle with a single anterior wall puncture. Standard vasodilator cocktail was given with nitroglycerin 200 mcg and verapamil 2.5 mg through the arterial sheath. Weight based heparin given to target therapeutic ACT and was monitored routinely throughout the case with additional heparin given as needed to keep ACT in therapeutic range at least > 250 sec. A 6F TIG catheter was advanced over a J tipped wire through the arterial sheath and placed in the aortic root, then used to selectively engage the left coronary artery. In an effort to conserve contrast, (patient extremely fearful  of needing HD), we took only one cranial view to see the LAD, and this appeared stable from his angio from 3/23. The catheter was then prolapsed into the LV over a wire and placed at the base of the LV. LV systolic and end diastolic pressure was then recorded. The catheter was pulled back and pullback measurements of LV and aortic pressure and recorded. The catheter was then removed over a wire and exchanged over a wire for a 6FJR4 guide and the RCA was then engaged and we took a limited angiogram of the RCA showing mostly stable, perhaps some progression of disease. From here I tried to conserve contrast and wired the vessel with a Stefano blue, then predilated with a 2.0 x 30 mm balloon. IVUS would not pass, so I predilated again with a 3.0 x 20 mm balloon. IVUS again would not pass, so I tried to place a 3.0 x 38 mm Synergy stent, however again, it would not make the first bend. With the help of a Guideliner, I was then able to position the stent, and then deployed this at 14 omari, treating essentially up to the ostium. I then post dilated the length of the stent, staying within the stent edges with a 3.5 NC to 18 omari. I performed IVUS again, showing a focal segment of underexpansion in the proximal stent, so I again post dilated this with a 3.5 NC up to 24 omari, again staying within the stent edge, with full expansion of the balloon. We took one final angiogram with the wire out showing good stent expansion and apposition, JOANNA 3 flow, no dissection or perforation. At the conclusion of the procedure, all catheters and wires were removed over a wire. The arterial sheath was removed and hemostasis achieved with standard TR band using patent hemostasis technique. There was no bleeding or hematoma. The patient tolerated the procedure well without complication. EBL <10 mL. Total contrast ~ 15 mL mL. See procedure log for fluoro time and radiation dose.  Michael Ornelas MD Interventional Cardiology [facility]       XR CHEST AP PORTABLE  (CPT=71045)    Result Date: 4/2/2024  PROCEDURE:  XR CHEST AP PORTABLE  (CPT=71045)  TECHNIQUE:  AP chest radiograph was obtained.  COMPARISON:  None.  INDICATIONS:  Chest pressure and heavyness, SOB, neck and jaw pain, 90/60s HX of diabetesx CAD  PATIENT STATED HISTORY: (As transcribed by Technologist)  Patient offered no additional history at this time.    FINDINGS:  Lung volumes are satisfactory.  No focal consolidation.  CP angles are sharp.  There is mild to moderate nonspecific peribronchial cuffing.  In the acute setting consider bronchitis or viral illness.  Heart and pulmonary vessels are normal caliber allowing for portable technique.  Mediastinal contours are smooth.  Atherosclerotic calcifications are seen in the thoracic aorta.  No pneumothorax.             CONCLUSION:  Peribronchial cuffing.   LOCATION:  [facility]      Dictated by (CST): Robby Dennis MD on 4/02/2024 at 7:12 PM     Finalized by (CST): Robby Dennis MD on 4/02/2024 at 7:13 PM         Operative reports:      Hospital course:    Patient is a 83 year old male with PMH sig for CAD, htn, hld, dm2, bph, CKD with hx of nephrectomy, here for scheduled procedure     *CAD  -with SOLANO, plan for staged PCI, not a surgical candidate   -sp angiogram 04/25   Conclusions / Recommendations:  1. Severe multivessel CAD, some progression since last angiogram from March 2023. We treated the more critical stenosis in the RCA today, which also supplies collaterals to the occluded LCX. The LAD has stably severe disease.    2. S/p IVUS guided PCI of prox-mid RCA with LEXIE x 1 (3.0 x 38 mm Synergy), post dilated 3.5 NC high pressure.   3. We used only 15 ml contrast, relying mostly on landmark and IVUS rather than angiography. I think his renal function should stay stable. We gave another 800 ml NS during the procedure as his LVEDP was normal ~ 10 mmHg.   4. Consider bringing him back after a month to treat the LAD. The OM1 is more of a medium  sized branch and could be left for medical therapy if he is not suffering debilitating angina. .   5. Remove the TR band per protocol  6. Continue DAPT, statin, medical therapy    -cont aspirin, plavix     *anemia  -hgb on admission 7.7.  Two weeks ago was 9.2 and was 11 last year  -iron levels low, ferritin low normal  No gross bleeding noted  -dw cards, rec gi eval prior to angiogram. GI consulted  -gi saw, no further intervention. Ok to proceed with angio from their standpoint  -anemia likel from chronic disease / renal disease but slow chronic blood loss unable to be ruled out. Has had cscope and egd 2 years ago, gastritis and polyps noted  Transfused 1 unit to keep> 8 gvien angina      *HLD  -statin     *HTN  -on coreg     *CKD 4  -alysa op hydration with ivfs  -renal consulted  -Cr stable after procedure     *DM 2  -ISS /accuchecks     *BPH  -flomax        Day of discharge exam:  Vitals:    04/26/24 1041   BP:    Pulse: 63   Resp: 24   Temp:      Was up in chair earlier,no cp sob  Now sleeping  Dw daughter at [facility] answered  If pt is able to ambulate later and no sob / cp, ok to dc    Total time coordinating care 32 min      Patient and/or family had opportunity to ask questions and expressed understanding and agreement with therapeutic plan as outlined         Carlos [facility] Hospitalist  [phone]  Answering Service: [phone]

## 2024-04-26 NOTE — CM/SW NOTE
SW met with pt at bedside to discuss discharge planning. PT recommending HHC at discharge, SW provided [place] list w/ [facility] option [facility]. [facility]. Reserved agency in [place]. Will send AVS once available.     Anticipate discharge today.     Ewa Sifuentes, MSW, LSW  Discharge Planner  [id number]

## 2024-04-26 NOTE — DISCHARGE INSTRUCTIONS
HOME CARE INSTRUCTIONS FOLLOWING CORONARY ANGIOGRAPHY, ANGIOPLASTY (PTCA/PTA) OR INSERTION OF STENT IN THE CORONARY ARTERIES        Activity  DO NOT drive after the procedure.  You may resume driving late the following day according to the nurse or physician's instructions  Plan on resting and relaxing tonight and tomorrow  Resume your normal activity after 48 hours, or as instructed by your physician  Do not lift anything over 10 pounds for the next 24 hours  Avoid drinking alcohol for the next 24 hours  If the groin site was used, avoid repeated stair use and excessive walking for the next 24 hours  If the wrist was used, avoid bending/flexing of the wrist for the next 24 hours     What is Normal?  A small lump at the procedure site associated with mild tenderness when touched  The procedure site may be bruised or discolored  There may be a small amount of drainage on the bandage     Special Instructions  Drink plenty of fluids during the next 24 hours to \"flush\" the contrast from your system  The bandage is to remain in place for 24 hours  Keep the bandage clean and dry  DO NOT submerge the procedure site for 72 hours (no bath tubs or pools)  This includes dishwashing/submersion of the wrist, if the wrist was used  After 24 hours, you must remove the bandage  You should shower after removing the bandage, and wash the procedure site gently with soap and water  If you choose to wear a bandage for a few days, make sure it remains clean and dry and that it is changed daily     When you should NOTIFY YOUR PHYSICIAN  Bleeding can occur at the procedure site - both on the outside of the skin and/or beneath the surface of the skin  Swelling or a large lump at the procedure site can occur, which may be accompanied by moderate to severe pain     If either of the above occurs, lie down flat.  Have someone apply pressure to the procedure site with both hands, as instructed by the nurse.  Hold pressure for 20 minutes and  the bleeding should stop.  Notify your physician of the occurrence  If the bleeding does not stop, call 911 and continue to apply pressure     If you experience signs of a fever, temperature > 101°, chills, infection (redness, swelling, thick yellow drainage, or a foul odor from the procedure site)  If you notice any numbness, tingling, or loss of feeling to your leg or foot or groin access  If you notice any numbness, tingling, or loss of feeling to your fingers or hand, if wrist access was utilized     If You Received a Stent:     You will remain on an antiplatelet drug and/or aspirin.  Antiplatelet medications are usually taken for six months to one year and should not be stopped unless your cardiologist directs you to do so.  These medications help to prevent blockage at the stent site.  If another physician or dentist asks you to stop your antiplatelet medication, you need to consult your cardiologist first.  Together, your cardiologist and other physician can discuss the risks that may be involved if you are not taking the antiplatelet medication   If an MRI is necessary, it may be done 4-6 weeks after your procedure.  Verify this with your cardiologist  Keep your stent card with you at all times!  If you need an MRI in the future, your stent card will need to be shown to the technologist before performing the MRI.  A duplicate card CANNOT be reproduced.     Other  You may resume your present diet, unless otherwise specified by your physician.  You may resume all of your medications as prescribed, unless otherwise directed by your physician.  A list of your medications was provided to you at discharge.  Continue the walking program initiated in the hospital and progress your walking as directed.  Or, gradually resume your previous aerobic exercise schedule as tolerated.  Please call your physician’s office for a follow-up appointment.  You should be seen in 2 weeks.      [employer]  [address] [place] [address]  [address]  [place] [address]  Phone: [phone]  Fax: [phone]    Sometimes managing your health at home requires assistance.  The [facility] team has recognized your preference to use [facility]: [phone]. A representative from the home health agency will contact you or your family to schedule your first visit.

## 2024-04-27 NOTE — PROGRESS NOTES
[facility] Cardiology  Consultation Note      Bonifacio Mccoy Patient Status:  Inpatient    1940 MRN [id number]   Location [facility] 8NE-A Attending Carlos Vaughn,    Hosp Day # 3 PCP Hiram Nugent MD     Reason for consult: CAD    Subjective: No CP or SOB. S/p PCI RCA LEXIE x 1. Used only 15 mL contrast.     Impression:  83 year old male   Exertional angina  Multivessel CAD, not surgical candidate  S/p PCI prox RCA LEXIE x 1 (24)  CKD 4  HTN  DM2  Anemia - GI has seen, no e/o bleeding, likely related to CKD    Recommendations:  Used only 15 ml contrast. I think renal function should stay stable. Gave 800 mL NS during procedure.   Continue ASA, plavix, statin, BB  Iron, CHASE per outpatient renal  He has residual disease in proximal LAD. Can bring him back for staged PCI after a month, once renal function stable  Ok for home.     Primary cardiologist: Cornell Bryson MD     History of Present Illness:  Bonifacio Mccoy is a 83 year old male with known MV CAD from cath , deemed high risk for CABG, CKD 4 with baseline Cr ~ 2.9, HTN who presented to [facility] on 2024 for planned PCI tomorrow. He will have renal consultation and IVF today. He has been experiencing burning chest pain with exertion relieved by rest, sometimes at rest lying in bed. Currently no symptoms. There are no reports of dyspnea, palpitations, leg swelling, orthopnea, PND, lightheadedness, syncope, claudication, stroke/TIA symptoms, or any outward signs of bleeding.          Medications:  Current Facility-Administered Medications   Medication Dose Route Frequency    iron sucrose (Venofer) 20 mg/mL injection 200 mg  200 mg Intravenous Daily    pantoprazole (Protonix) DR tab 40 mg  40 mg Oral BID AC    sodium chloride 0.9% infusion   Intravenous Once    atorvastatin (Lipitor) tab 80 mg  80 mg Oral Nightly    carvedilol (Coreg) tab 6.25 mg  6.25 mg Oral Daily    clopidogrel (Plavix) tab 75 mg  75 mg  Oral Daily    ezetimibe (Zetia) tab 10 mg  10 mg Oral Nightly    tamsulosin (Flomax) cap 0.4 mg  0.4 mg Oral Daily    glucose (Dex4) 15 GM/59ML oral liquid 15 g  15 g Oral Q15 Min PRN    Or    glucose (Glutose) 40% oral gel 15 g  15 g Oral Q15 Min PRN    Or    glucose-vitamin C (Dex-4) chewable tab 4 tablet  4 tablet Oral Q15 Min PRN    Or    dextrose 50% injection 50 mL  50 mL Intravenous Q15 Min PRN    Or    glucose (Dex4) 15 GM/59ML oral liquid 30 g  30 g Oral Q15 Min PRN    Or    glucose (Glutose) 40% oral gel 30 g  30 g Oral Q15 Min PRN    Or    glucose-vitamin C (Dex-4) chewable tab 8 tablet  8 tablet Oral Q15 Min PRN    insulin aspart (NovoLOG) 100 Units/mL FlexPen 1-5 Units  1-5 Units Subcutaneous TID AC and HS    aspirin chewable tab 81 mg  81 mg Oral Daily    acetaminophen (Tylenol) tab 650 mg  650 mg Oral Q6H PRN       Past Medical History:    Arthritis    Congestive heart disease (HCC)    Diabetes (HCC)    Essential hypertension    High blood pressure    High cholesterol    History of blood clots    Muscle weakness    Renal disorder    Sleep apnea    non compliant with CPAP       Past Surgical History:   Procedure Laterality Date    Nephrectomy N/A     Other surgical history N/A     nephrectomy       Family History  family history is not on file.    Social History   reports that he has never smoked. He has never used smokeless tobacco. He reports that he does not drink alcohol and does not use drugs.     Allergies  Allergies   Allergen Reactions    Losartan UNKNOWN    Lisinopril ITCHING       Review of Systems:  As per HPI, otherwise 10 point ROS is negative in detail.    Physical Exam:  Blood pressure 146/54, pulse 58, temperature 98.2 °F (36.8 °C), temperature source Oral, resp. rate 20, weight 171 lb 15.3 oz (78 kg), SpO2 97%.  Temp (24hrs), Av.5 °F (36.9 °C), Min:98.2 °F (36.8 °C), Max:99.1 °F (37.3 °C)    Wt Readings from Last 3 Encounters:   24 171 lb 15.3 oz (78 kg)   24 180 lb  (81.6 kg)   03/16/23 177 lb (80.3 kg)       General: Awake and alert; in no acute distress  HEENT: Extraocular movements are intact; sclerae are anicteric; scalp is atraumatic  Neck: Supple; no JVD; no carotid bruits  Cardiac: Regular rate and regular rhythm; normal S1 and S2, no murmurs, rubs, or gallops are appreciated  Lungs: Clear to auscultation bilaterally; no accessory muscle use is noted, no wheezes, rhonci or rales  Abdomen: Soft, non-distended, non-tender; bowel sounds are normoactive  Extremities: Warm, no edema, clubbing or cyanosis; moves all 4 extremities normally, distal pulses intact and equal  Psychiatric: Normal mood and affect; answers questions appropriately  Dermatologic: No rashes; normal skin turgor    Diagnostic testing:    Labs:   No results found for: \"PT\", \"INR\"     Lab Results   Component Value Date    WBC 8.5 04/26/2024    HGB 9.4 04/26/2024    HCT 27.2 04/26/2024    .0 04/26/2024    CREATSERUM 2.21 04/26/2024    BUN 44 04/26/2024     04/26/2024    K 4.2 04/26/2024     04/26/2024    CO2 23.0 04/26/2024     04/26/2024    CA 8.5 04/26/2024    PGLU 120 04/26/2024       Cardiac diagnostics:    Nuc stress 2/1/23  - Myocardial perfusion imaging: There is a small to moderate, mild,   reversible defect involving the basal and mid inferolateral wall(s),   consistent with ischemia.   - EF > 70%    EKG 8/19/22: SR 56 bpm, normal    Nuc stress 8/21: Normal perfusion    Echo 8/21  1. Left ventricle: The cavity size was normal. Wall thickness was normal.   Systolic function was normal. The estimated ejection fraction was 65-70%.   2. Mitral valve: Mildly calcified annulus.   3. Left atrium: The left atrium was mildly enlarged.    Thank you for allowing our practice to participate in the care of your patient. Please do not hesitate to contact me if you have any questions.    Michael Ornelas MD  Interventional Cardiology  [facility]  Office: [phone]

## 2024-04-30 ENCOUNTER — PATIENT OUTREACH (OUTPATIENT)
Dept: CASE MANAGEMENT | Age: 84
End: 2024-04-30

## 2024-04-30 NOTE — PROGRESS NOTES
Hospital follow up.    Michael Ornelas MD  Cardiology  [address]   [address]  [place], [place] [id number] [phone]  ASAP    No answer, left a voicemail with callback information.

## 2024-05-01 NOTE — PROGRESS NOTES
Hospital follow up.    Michael Ornelas MD  Cardiology  [address]   [address]  [place], [place] [id number] [phone]  ASAP    No answer, left a voicemail with callback information.    Closing encounter.

## 2024-05-21 ENCOUNTER — EXTERNAL RECORD (OUTPATIENT)
Dept: HEALTH INFORMATION MANAGEMENT | Facility: OTHER | Age: 84
End: 2024-05-21

## 2024-05-22 ENCOUNTER — CLINICAL ABSTRACT (OUTPATIENT)
Dept: INFUSION THERAPY | Age: 84
End: 2024-05-22

## 2024-05-22 PROBLEM — D63.1 ANEMIA DUE TO STAGE 4 CHRONIC KIDNEY DISEASE  (CMD): Status: ACTIVE | Noted: 2024-05-22

## 2024-05-22 PROBLEM — N18.4 ANEMIA DUE TO STAGE 4 CHRONIC KIDNEY DISEASE  (CMD): Status: ACTIVE | Noted: 2024-05-22

## 2024-05-22 RX ORDER — DIPHENHYDRAMINE HYDROCHLORIDE 50 MG/ML
50 INJECTION INTRAMUSCULAR; INTRAVENOUS
OUTPATIENT
Start: 2024-05-22

## 2024-05-22 RX ORDER — ALBUTEROL SULFATE 90 UG/1
2 AEROSOL, METERED RESPIRATORY (INHALATION)
OUTPATIENT
Start: 2024-05-22

## 2024-05-22 RX ORDER — 0.9 % SODIUM CHLORIDE 0.9 %
10 VIAL (ML) INJECTION PRN
OUTPATIENT
Start: 2024-05-22

## 2024-05-22 RX ORDER — SODIUM CHLORIDE 9 MG/ML
INJECTION, SOLUTION INTRAVENOUS CONTINUOUS PRN
OUTPATIENT
Start: 2024-05-22

## 2024-05-22 RX ORDER — FAMOTIDINE 10 MG/ML
20 INJECTION, SOLUTION INTRAVENOUS
OUTPATIENT
Start: 2024-05-22

## 2024-05-28 ENCOUNTER — HOSPITAL ENCOUNTER (OUTPATIENT)
Dept: INTERVENTIONAL RADIOLOGY/VASCULAR | Facility: HOSPITAL | Age: 84
Discharge: HOME | End: 2024-05-28
Attending: INTERNAL MEDICINE

## 2024-05-30 ENCOUNTER — TELEPHONE (OUTPATIENT)
Dept: INFUSION THERAPY | Age: 84
End: 2024-05-30

## 2024-06-04 ENCOUNTER — HOSPITAL ENCOUNTER (OUTPATIENT)
Dept: INFUSION THERAPY | Age: 84
Discharge: STILL ADMITTED OR ADMINISTRATIVE | End: 2024-06-04
Attending: INTERNAL MEDICINE

## 2024-06-04 VITALS
SYSTOLIC BLOOD PRESSURE: 126 MMHG | TEMPERATURE: 98.3 F | RESPIRATION RATE: 16 BRPM | BODY MASS INDEX: 32.23 KG/M2 | OXYGEN SATURATION: 100 % | WEIGHT: 165.01 LBS | DIASTOLIC BLOOD PRESSURE: 76 MMHG | HEART RATE: 57 BPM

## 2024-06-04 DIAGNOSIS — D63.1 ANEMIA DUE TO STAGE 4 CHRONIC KIDNEY DISEASE  (CMD): Primary | ICD-10-CM

## 2024-06-04 DIAGNOSIS — N18.4 ANEMIA DUE TO STAGE 4 CHRONIC KIDNEY DISEASE  (CMD): Primary | ICD-10-CM

## 2024-06-04 PROCEDURE — 10002800 HB RX 250 W HCPCS: Performed by: INTERNAL MEDICINE

## 2024-06-04 PROCEDURE — 10002807 HB RX 258: Performed by: INTERNAL MEDICINE

## 2024-06-04 PROCEDURE — 96374 THER/PROPH/DIAG INJ IV PUSH: CPT

## 2024-06-04 RX ORDER — MIDODRINE HYDROCHLORIDE 5 MG/1
TABLET ORAL
COMMUNITY

## 2024-06-04 RX ORDER — SODIUM CHLORIDE 9 MG/ML
INJECTION, SOLUTION INTRAVENOUS CONTINUOUS PRN
OUTPATIENT
Start: 2024-06-11

## 2024-06-04 RX ORDER — FAMOTIDINE 10 MG/ML
20 INJECTION, SOLUTION INTRAVENOUS
OUTPATIENT
Start: 2024-06-11

## 2024-06-04 RX ORDER — FAMOTIDINE 20 MG/1
TABLET, FILM COATED ORAL
COMMUNITY
Start: 2024-05-17

## 2024-06-04 RX ORDER — 0.9 % SODIUM CHLORIDE 0.9 %
10 VIAL (ML) INJECTION PRN
OUTPATIENT
Start: 2024-06-11

## 2024-06-04 RX ORDER — DIPHENHYDRAMINE HYDROCHLORIDE 50 MG/ML
50 INJECTION INTRAMUSCULAR; INTRAVENOUS
OUTPATIENT
Start: 2024-06-11

## 2024-06-04 RX ORDER — ALBUTEROL SULFATE 90 UG/1
2 AEROSOL, METERED RESPIRATORY (INHALATION)
OUTPATIENT
Start: 2024-06-11

## 2024-06-04 RX ORDER — 0.9 % SODIUM CHLORIDE 0.9 %
10 VIAL (ML) INJECTION PRN
Status: DISCONTINUED | OUTPATIENT
Start: 2024-06-04 | End: 2024-06-06 | Stop reason: HOSPADM

## 2024-06-04 RX ADMIN — SODIUM CHLORIDE 25 ML: 9 INJECTION, SOLUTION INTRAVENOUS at 15:17

## 2024-06-04 RX ADMIN — FERUMOXYTOL 510 MG: 510 INJECTION INTRAVENOUS at 15:18

## 2024-06-04 ASSESSMENT — PAIN SCALES - GENERAL
PAINLEVEL_OUTOF10: 0
PAINLEVEL: 0

## 2024-06-11 ENCOUNTER — APPOINTMENT (OUTPATIENT)
Dept: INFUSION THERAPY | Age: 84
End: 2024-06-11
Attending: INTERNAL MEDICINE

## 2024-06-11 VITALS
DIASTOLIC BLOOD PRESSURE: 62 MMHG | OXYGEN SATURATION: 100 % | WEIGHT: 161.05 LBS | TEMPERATURE: 98.1 F | HEART RATE: 56 BPM | RESPIRATION RATE: 16 BRPM | SYSTOLIC BLOOD PRESSURE: 134 MMHG | BODY MASS INDEX: 31.45 KG/M2

## 2024-06-11 DIAGNOSIS — N18.4 ANEMIA DUE TO STAGE 4 CHRONIC KIDNEY DISEASE  (CMD): Primary | ICD-10-CM

## 2024-06-11 DIAGNOSIS — D63.1 ANEMIA DUE TO STAGE 4 CHRONIC KIDNEY DISEASE  (CMD): Primary | ICD-10-CM

## 2024-06-11 PROCEDURE — 10002800 HB RX 250 W HCPCS: Performed by: INTERNAL MEDICINE

## 2024-06-11 PROCEDURE — 96374 THER/PROPH/DIAG INJ IV PUSH: CPT

## 2024-06-11 PROCEDURE — 10002807 HB RX 258: Performed by: INTERNAL MEDICINE

## 2024-06-11 RX ORDER — FAMOTIDINE 10 MG/ML
20 INJECTION, SOLUTION INTRAVENOUS
OUTPATIENT
Start: 2024-06-11

## 2024-06-11 RX ORDER — 0.9 % SODIUM CHLORIDE 0.9 %
10 VIAL (ML) INJECTION PRN
Status: DISCONTINUED | OUTPATIENT
Start: 2024-06-11 | End: 2024-06-11

## 2024-06-11 RX ORDER — SODIUM CHLORIDE 9 MG/ML
INJECTION, SOLUTION INTRAVENOUS CONTINUOUS PRN
OUTPATIENT
Start: 2024-06-11

## 2024-06-11 RX ORDER — 0.9 % SODIUM CHLORIDE 0.9 %
10 VIAL (ML) INJECTION PRN
OUTPATIENT
Start: 2024-06-11

## 2024-06-11 RX ORDER — ALBUTEROL SULFATE 90 UG/1
2 AEROSOL, METERED RESPIRATORY (INHALATION)
OUTPATIENT
Start: 2024-06-11

## 2024-06-11 RX ORDER — DIPHENHYDRAMINE HYDROCHLORIDE 50 MG/ML
50 INJECTION INTRAMUSCULAR; INTRAVENOUS
OUTPATIENT
Start: 2024-06-11

## 2024-06-11 RX ADMIN — SODIUM CHLORIDE 25 ML: 9 INJECTION, SOLUTION INTRAVENOUS at 12:56

## 2024-06-11 RX ADMIN — FERUMOXYTOL 510 MG: 510 INJECTION INTRAVENOUS at 12:57

## 2024-06-15 ENCOUNTER — HOSPITAL ENCOUNTER (OUTPATIENT)
Facility: HOSPITAL | Age: 84
Setting detail: OBSERVATION
Discharge: HOME HEALTH | End: 2024-06-16
Attending: EMERGENCY MEDICINE | Admitting: HOSPITALIST

## 2024-06-15 ENCOUNTER — APPOINTMENT (OUTPATIENT)
Dept: GENERAL RADIOLOGY | Facility: HOSPITAL | Age: 84
End: 2024-06-15
Attending: EMERGENCY MEDICINE

## 2024-06-15 DIAGNOSIS — E87.1 HYPONATREMIA: ICD-10-CM

## 2024-06-15 DIAGNOSIS — N18.4 CKD (CHRONIC KIDNEY DISEASE), STAGE IV (HCC): ICD-10-CM

## 2024-06-15 DIAGNOSIS — R55 SYNCOPE AND COLLAPSE: Primary | ICD-10-CM

## 2024-06-15 PROBLEM — N17.9 ACUTE KIDNEY INJURY (HCC): Status: ACTIVE | Noted: 2024-06-15

## 2024-06-15 LAB
ALBUMIN SERPL-MCNC: 3.4 G/DL (ref 3.4–5)
ALBUMIN/GLOB SERPL: 0.9 {RATIO} (ref 1–2)
ALP LIVER SERPL-CCNC: 99 U/L
ALT SERPL-CCNC: 41 U/L
ANION GAP SERPL CALC-SCNC: 10 MMOL/L (ref 0–18)
AST SERPL-CCNC: 41 U/L (ref 15–37)
ATRIAL RATE: 53 BPM
BASOPHILS # BLD AUTO: 0.04 X10(3) UL (ref 0–0.2)
BASOPHILS NFR BLD AUTO: 0.5 %
BILIRUB SERPL-MCNC: 0.9 MG/DL (ref 0.1–2)
BILIRUB UR QL STRIP.AUTO: NEGATIVE
BUN BLD-MCNC: 47 MG/DL (ref 9–23)
CALCIUM BLD-MCNC: 8.9 MG/DL (ref 8.5–10.1)
CHLORIDE SERPL-SCNC: 99 MMOL/L (ref 98–112)
CLARITY UR REFRACT.AUTO: CLEAR
CO2 SERPL-SCNC: 22 MMOL/L (ref 21–32)
COLOR UR AUTO: COLORLESS
CREAT BLD-MCNC: 3 MG/DL
EGFRCR SERPLBLD CKD-EPI 2021: 20 ML/MIN/1.73M2 (ref 60–?)
EOSINOPHIL # BLD AUTO: 0.19 X10(3) UL (ref 0–0.7)
EOSINOPHIL NFR BLD AUTO: 2.2 %
ERYTHROCYTE [DISTWIDTH] IN BLOOD BY AUTOMATED COUNT: 13.5 %
GLOBULIN PLAS-MCNC: 3.8 G/DL (ref 2.8–4.4)
GLUCOSE BLD-MCNC: 130 MG/DL (ref 70–99)
GLUCOSE BLD-MCNC: 213 MG/DL (ref 70–99)
GLUCOSE UR STRIP.AUTO-MCNC: 50 MG/DL
HCT VFR BLD AUTO: 28.8 %
HGB BLD-MCNC: 10.2 G/DL
IMM GRANULOCYTES # BLD AUTO: 0.04 X10(3) UL (ref 0–1)
IMM GRANULOCYTES NFR BLD: 0.5 %
KETONES UR STRIP.AUTO-MCNC: NEGATIVE MG/DL
LEUKOCYTE ESTERASE UR QL STRIP.AUTO: NEGATIVE
LYMPHOCYTES # BLD AUTO: 3.02 X10(3) UL (ref 1–4)
LYMPHOCYTES NFR BLD AUTO: 34.4 %
MCH RBC QN AUTO: 31.7 PG (ref 26–34)
MCHC RBC AUTO-ENTMCNC: 35.4 G/DL (ref 31–37)
MCV RBC AUTO: 89.4 FL
MONOCYTES # BLD AUTO: 0.86 X10(3) UL (ref 0.1–1)
MONOCYTES NFR BLD AUTO: 9.8 %
NEUTROPHILS # BLD AUTO: 4.64 X10 (3) UL (ref 1.5–7.7)
NEUTROPHILS # BLD AUTO: 4.64 X10(3) UL (ref 1.5–7.7)
NEUTROPHILS NFR BLD AUTO: 52.6 %
NITRITE UR QL STRIP.AUTO: NEGATIVE
OSMOLALITY SERPL CALC.SUM OF ELEC: 286 MOSM/KG (ref 275–295)
P AXIS: 24 DEGREES
P-R INTERVAL: 166 MS
PH UR STRIP.AUTO: 6.5 [PH] (ref 5–8)
PLATELET # BLD AUTO: 198 10(3)UL (ref 150–450)
POTASSIUM SERPL-SCNC: 3.8 MMOL/L (ref 3.5–5.1)
PROT SERPL-MCNC: 7.2 G/DL (ref 6.4–8.2)
PROT UR STRIP.AUTO-MCNC: NEGATIVE MG/DL
Q-T INTERVAL: 436 MS
QRS DURATION: 82 MS
QTC CALCULATION (BEZET): 409 MS
R AXIS: 8 DEGREES
RBC # BLD AUTO: 3.22 X10(6)UL
RBC UR QL AUTO: NEGATIVE
SODIUM SERPL-SCNC: 131 MMOL/L (ref 136–145)
SP GR UR STRIP.AUTO: 1.01 (ref 1–1.03)
T AXIS: 19 DEGREES
TROPONIN I SERPL HS-MCNC: 15 NG/L
TROPONIN I SERPL HS-MCNC: 16 NG/L
UROBILINOGEN UR STRIP.AUTO-MCNC: NORMAL MG/DL
VENTRICULAR RATE: 53 BPM
WBC # BLD AUTO: 8.8 X10(3) UL (ref 4–11)

## 2024-06-15 PROCEDURE — 36415 COLL VENOUS BLD VENIPUNCTURE: CPT

## 2024-06-15 PROCEDURE — 93010 ELECTROCARDIOGRAM REPORT: CPT

## 2024-06-15 PROCEDURE — 93005 ELECTROCARDIOGRAM TRACING: CPT

## 2024-06-15 PROCEDURE — 85025 COMPLETE CBC W/AUTO DIFF WBC: CPT

## 2024-06-15 PROCEDURE — 84484 ASSAY OF TROPONIN QUANT: CPT

## 2024-06-15 PROCEDURE — 94660 CPAP INITIATION&MGMT: CPT

## 2024-06-15 PROCEDURE — 99285 EMERGENCY DEPT VISIT HI MDM: CPT

## 2024-06-15 PROCEDURE — 84484 ASSAY OF TROPONIN QUANT: CPT | Performed by: EMERGENCY MEDICINE

## 2024-06-15 PROCEDURE — 81003 URINALYSIS AUTO W/O SCOPE: CPT | Performed by: HOSPITALIST

## 2024-06-15 PROCEDURE — 82962 GLUCOSE BLOOD TEST: CPT

## 2024-06-15 PROCEDURE — 97116 GAIT TRAINING THERAPY: CPT

## 2024-06-15 PROCEDURE — 97162 PT EVAL MOD COMPLEX 30 MIN: CPT

## 2024-06-15 PROCEDURE — 80053 COMPREHEN METABOLIC PANEL: CPT | Performed by: EMERGENCY MEDICINE

## 2024-06-15 PROCEDURE — 71045 X-RAY EXAM CHEST 1 VIEW: CPT | Performed by: EMERGENCY MEDICINE

## 2024-06-15 PROCEDURE — 85025 COMPLETE CBC W/AUTO DIFF WBC: CPT | Performed by: EMERGENCY MEDICINE

## 2024-06-15 PROCEDURE — 84484 ASSAY OF TROPONIN QUANT: CPT | Performed by: HOSPITALIST

## 2024-06-15 PROCEDURE — 80053 COMPREHEN METABOLIC PANEL: CPT

## 2024-06-15 RX ORDER — ACETAMINOPHEN 500 MG
500 TABLET ORAL EVERY 4 HOURS PRN
Status: DISCONTINUED | OUTPATIENT
Start: 2024-06-15 | End: 2024-06-16

## 2024-06-15 RX ORDER — MIDODRINE HYDROCHLORIDE 5 MG/1
5 TABLET ORAL 3 TIMES DAILY
COMMUNITY

## 2024-06-15 RX ORDER — CARVEDILOL 6.25 MG/1
6.25 TABLET ORAL
Status: DISCONTINUED | OUTPATIENT
Start: 2024-06-16 | End: 2024-06-15

## 2024-06-15 RX ORDER — TAMSULOSIN HYDROCHLORIDE 0.4 MG/1
0.4 CAPSULE ORAL AS DIRECTED
COMMUNITY

## 2024-06-15 RX ORDER — INSULIN DEGLUDEC 100 U/ML
8 INJECTION, SOLUTION SUBCUTANEOUS NIGHTLY
Status: DISCONTINUED | OUTPATIENT
Start: 2024-06-15 | End: 2024-06-16

## 2024-06-15 RX ORDER — SODIUM CHLORIDE 9 MG/ML
INJECTION, SOLUTION INTRAVENOUS CONTINUOUS
Status: ACTIVE | OUTPATIENT
Start: 2024-06-15 | End: 2024-06-16

## 2024-06-15 RX ORDER — METOCLOPRAMIDE HYDROCHLORIDE 5 MG/ML
5 INJECTION INTRAMUSCULAR; INTRAVENOUS EVERY 8 HOURS PRN
Status: DISCONTINUED | OUTPATIENT
Start: 2024-06-15 | End: 2024-06-16

## 2024-06-15 RX ORDER — EZETIMIBE 10 MG/1
10 TABLET ORAL NIGHTLY
Status: DISCONTINUED | OUTPATIENT
Start: 2024-06-15 | End: 2024-06-16

## 2024-06-15 RX ORDER — ACETAMINOPHEN 500 MG
1000 TABLET ORAL ONCE
Status: COMPLETED | OUTPATIENT
Start: 2024-06-15 | End: 2024-06-15

## 2024-06-15 RX ORDER — TORSEMIDE 20 MG/1
20 TABLET ORAL DAILY
COMMUNITY

## 2024-06-15 RX ORDER — PANTOPRAZOLE SODIUM 40 MG/1
40 TABLET, DELAYED RELEASE ORAL
Status: DISCONTINUED | OUTPATIENT
Start: 2024-06-16 | End: 2024-06-16

## 2024-06-15 RX ORDER — EZETIMIBE 10 MG/1
10 TABLET ORAL NIGHTLY
COMMUNITY

## 2024-06-15 RX ORDER — POTASSIUM CHLORIDE 14.9 MG/ML
20 INJECTION INTRAVENOUS ONCE
Status: DISCONTINUED | OUTPATIENT
Start: 2024-06-15 | End: 2024-06-16

## 2024-06-15 RX ORDER — CLOPIDOGREL BISULFATE 75 MG/1
75 TABLET ORAL DAILY
Status: DISCONTINUED | OUTPATIENT
Start: 2024-06-16 | End: 2024-06-16

## 2024-06-15 RX ORDER — FAMOTIDINE 20 MG/1
20 TABLET, FILM COATED ORAL NIGHTLY PRN
COMMUNITY

## 2024-06-15 RX ORDER — ONDANSETRON 2 MG/ML
4 INJECTION INTRAMUSCULAR; INTRAVENOUS EVERY 6 HOURS PRN
Status: DISCONTINUED | OUTPATIENT
Start: 2024-06-15 | End: 2024-06-16

## 2024-06-15 RX ORDER — ATORVASTATIN CALCIUM 80 MG/1
80 TABLET, FILM COATED ORAL NIGHTLY
Status: DISCONTINUED | OUTPATIENT
Start: 2024-06-15 | End: 2024-06-16

## 2024-06-15 RX ORDER — ONDANSETRON 2 MG/ML
4 INJECTION INTRAMUSCULAR; INTRAVENOUS EVERY 4 HOURS PRN
Status: DISCONTINUED | OUTPATIENT
Start: 2024-06-15 | End: 2024-06-15 | Stop reason: HOSPADM

## 2024-06-15 RX ORDER — CALCITRIOL 0.25 UG/1
0.25 CAPSULE, LIQUID FILLED ORAL DAILY
Status: DISCONTINUED | OUTPATIENT
Start: 2024-06-16 | End: 2024-06-16

## 2024-06-15 RX ORDER — ALLOPURINOL 300 MG/1
300 TABLET ORAL DAILY
Status: DISCONTINUED | OUTPATIENT
Start: 2024-06-16 | End: 2024-06-16

## 2024-06-15 RX ORDER — POTASSIUM CHLORIDE 20 MEQ/1
20 TABLET, EXTENDED RELEASE ORAL ONCE
Status: COMPLETED | OUTPATIENT
Start: 2024-06-15 | End: 2024-06-15

## 2024-06-15 RX ORDER — HYDROMORPHONE HYDROCHLORIDE 1 MG/ML
0.5 INJECTION, SOLUTION INTRAMUSCULAR; INTRAVENOUS; SUBCUTANEOUS EVERY 30 MIN PRN
Status: ACTIVE | OUTPATIENT
Start: 2024-06-15 | End: 2024-06-15

## 2024-06-15 RX ORDER — HEPARIN SODIUM 5000 [USP'U]/ML
5000 INJECTION, SOLUTION INTRAVENOUS; SUBCUTANEOUS EVERY 8 HOURS SCHEDULED
Status: DISCONTINUED | OUTPATIENT
Start: 2024-06-15 | End: 2024-06-16

## 2024-06-15 RX ORDER — ASPIRIN 81 MG/1
81 TABLET, CHEWABLE ORAL DAILY
Status: DISCONTINUED | OUTPATIENT
Start: 2024-06-16 | End: 2024-06-16

## 2024-06-15 RX ORDER — TAMSULOSIN HYDROCHLORIDE 0.4 MG/1
0.4 CAPSULE ORAL
Status: DISCONTINUED | OUTPATIENT
Start: 2024-06-16 | End: 2024-06-16

## 2024-06-15 NOTE — ED QUICK NOTES
Orders for admission, patient is aware of plan and ready to go upstairs. Any questions, please call ED RN Michael at extension [phone].     Patient Covid vaccination status: Fully vaccinated     COVID Test Ordered in ED: None    COVID Suspicion at Admission: N/A    Running Infusions:  None    Mental Status/LOC at time of transport: Alert    Other pertinent information:   CIWA score: N/A   NIH score:  N/A

## 2024-06-15 NOTE — H&P
[facility]   part of [facility]    History and Physical     Bonifacio Mccoy Patient Status:  Observation    1940 MRN [id number]   Location [facility] 3NE-A Attending Pete Sandoval MD   Hosp Day # 0 PCP Hiram Nugent MD     Chief Complaint:   Chief Complaint   Patient presents with    Syncope       History of Present Illness: Bonifacio Mccoy is a 83 year old male with chronic diastolic CHF, DM2, HTN/HL, CKD 3B who presented to the ED following a syncopal event this morning.  Patient primarily speaks rigid but history is provided by family at bedside.  Medical treatment was offered but they preferred to interpret.    Per family, patient has been endorsing dizziness throughout the day with some associated nausea/vomiting before he suddenly lost consciousness and lowered himself to the ground.  It was witnessed, per family there was no head trauma and he was unconscious for approximately 20 to 30 seconds.  Upon waking he did not have any residual symptoms or deficits.    Per family, patient has had kidney injury in the past and is afraid of needing to be on dialysis, so we will monitor urine output closely and will often take additional doses of Lasix when he notices he is not peeing much.  They think he might have done so in the last day or 2 due to decreased urine output.  His daughter reports that he is also about 10 pounds below his usual weight.    On arrival to the ED he was afebrile, pulse 49, BP 90/51, SpO2 100% on room air.  Labs were notable for creatinine 3.0 (baseline 2.2-2.6), sodium 131, otherwise unremarkable from baseline.  Troponin was normal and EKG without acute ischemic changes.  He was admitted for observation and cardiology was consulted.    Following admission to the floor, he was noted to be hypotensive to 81/47.  He was symptomatic during orthostatics    Past Medical History:  Past Medical History:    Arthritis    Congestive heart disease (HCC)    Diabetes  (HCC)    Essential hypertension    High blood pressure    High cholesterol    History of blood clots    Muscle weakness    Renal disorder    Sleep apnea    non compliant with CPAP        Past Surgical History:   Past Surgical History:   Procedure Laterality Date    Nephrectomy N/A     Other surgical history N/A     nephrectomy       Social History:  reports that he has never smoked. He has never used smokeless tobacco. He reports that he does not drink alcohol and does not use drugs.    Family History: History reviewed. No pertinent family history.    Allergies:   Allergies   Allergen Reactions    Losartan UNKNOWN    Lisinopril ITCHING       Medications:    No current facility-administered medications on file prior to encounter.     Current Outpatient Medications on File Prior to Encounter   Medication Sig Dispense Refill    torsemide 20 MG Oral Tab Take 1 tablet (20 mg total) by mouth daily.      ezetimibe 10 MG Oral Tab Take 1 tablet (10 mg total) by mouth nightly.      midodrine 5 MG Oral Tab Take 1 tablet (5 mg total) by mouth in the morning and 1 tablet (5 mg total) at noon and 1 tablet (5 mg total) in the evening.      famotidine 20 MG Oral Tab Take 1 tablet (20 mg total) by mouth nightly as needed for Heartburn.      aspirin 81 MG Oral Chew Tab Chew 1 tablet (81 mg total) by mouth daily. 30 tablet 0    insulin glargine 100 UNIT/ML Subcutaneous Solution Inject 8 Units into the skin nightly.      atorvastatin 80 MG Oral Tab Take 1 tablet (80 mg total) by mouth nightly.      clopidogrel 75 MG Oral Tab Take 1 tablet (75 mg total) by mouth daily.      calcitriol 0.25 MCG Oral Cap Take 1 capsule (0.25 mcg total) by mouth every other day.      Vitamin D3, Cholecalciferol, 25 MCG (1000 UT) Oral Tab Take 1 tablet (1,000 Units total) by mouth daily.      tamsulosin HCl 0.4 MG Oral Cap Take 1 capsule (0.4 mg total) by mouth daily.      tamsulosin 0.4 MG Oral Cap Take 1 capsule (0.4 mg total) by mouth As Directed.       allopurinol 100 MG Oral Tab Take 1 tablet (100 mg total) by mouth as needed (1 tablet daily).         Review of Systems:   A comprehensive 14 point review of systems was completed.    Pertinent positives and negatives noted in the HPI.    Physical Exam:    /49   Pulse 53   Temp 96.5 °F (35.8 °C) (Temporal)   Resp 16   Wt 171 lb 15.3 oz (78 kg)   SpO2 100%   BMI 28.62 kg/m²     General: No acute distress. Comfortable, nontoxic   HEENT: Normocephalic atraumatic. Dry mucous membranes; Sclera anicteric.  Neck: Supple, no JVD  Respiratory: Clear to auscultation bilaterally. Reg resp rate & effort, no wheezes/crackles   Cardiovascular: S1, S2. Regular rate and rhythm. No murmurs appreciable   Chest and Back: No tenderness or deformity.  Abdomen: Soft, nontender, nondistended.  Positive bowel sounds. No rebound, guarding or organomegaly.  Neurologic: No focal neurological deficits. CNII-XII grossly intact.  Musculoskeletal: Moves all extremities.  Extremities: No edema or cyanosis.  Skin: No rashes or lesions.  Poor skin turgor noted  Psychiatric: Appropriate mood and affect.      Diagnostic Data:      Labs:  Recent Labs   Lab 06/15/24  1233   WBC 8.8   HGB 10.2*   MCV 89.4   .0       Recent Labs   Lab 06/15/24  1233   *   BUN 47*   CREATSERUM 3.00*   CA 8.9   ALB 3.4   *   K 3.8   CL 99   CO2 22.0   ALKPHO 99   AST 41*   ALT 41   BILT 0.9   TP 7.2       Estimated Creatinine Clearance: 16.8 mL/min (A) (based on SCr of 3 mg/dL (H)).    No results for input(s): \"PTP\", \"INR\" in the last 168 hours.    No results for input(s): \"TROP\", \"CK\" in the last 168 hours.    Imaging: Imaging data reviewed in Epic.      ASSESSMENT / PLAN:     Bonifacio Mcocy Is a a 83 year old male who presents with syncopal episode, suspect due to hypotension secondary to dehydration    Problem List / Diagnoses    Syncope  Hypotension  JIMI on CKD 3B  CAD  Chronic diastolic  CHF  Bradycardia  HTN/HL  DM2    Plan    Syncope  Hypotension  -Suspect secondary to hypotension in the setting of dehydration, due to overdiuresis  - Give 500 cc normal saline x 1, continue maintenance fluids at 100 mL/h x 12 hours  -Repeat orthostatics in a.m.  - Bradycardia may also be contributor, holding Coreg and all antihypertensives  -Cardiology consulted, will follow-up recommendations  - Initial troponin negative, trend to rule out ACS  - Continue to monitor on telemetry  - PT/OT prior to discharge    JIMI on CKD 3B  -Creatinine 3.0, baseline 2.2-2.6  - Likely secondary to overdiuresis, per above  - Renally dose meds, avoid nephrotoxic agents  - Holding ARB  - Consider nephrology consult in a.m. if no improvement with IV fluids    CAD  Chronic diastolic CHF  Bradycardia  HTN/HL  -Appears dry on exam  -Holding ARB, diuretics per above  - Strict I/O's, daily weights  - Resume aspirin, statin, Plavix    DM2  -Resume home long-acting insulin 8 units nightly  - Holding oral antihyperglycemics while admitted  - Accu-Cheks, SSI-low    DVT Mechanical Prophylaxis:   SCDs,    DVT Pharmacologic Prophylaxis   Medication    heparin (Porcine) 5000 UNIT/ML injection 5,000 Units      DVT Pharmacologic prophylaxis: Aspirin 162 mg     Code Status: Full-confirmed with patient and family on admission    Dispo: Observation; CHANTEL tomorrow pending improved creatinine, improvement in blood pressure, cardiology evaluation and clearance    Plan of care discussed with patient and/or family at bedside.    DEONNA Sandoval MD  [facility]   [phone]

## 2024-06-15 NOTE — PROGRESS NOTES
Orthostats     06/15/24 1648 06/15/24 1650 06/15/24 1651   Vitals   BP (!) 81/47 (!) 81/39 (!) 74/39   MAP (mmHg) (!) 58 (!) 52 (!) 51   BP Location Right arm Right arm Right arm   BP Method Automatic Automatic Automatic   Patient Position Lying Sitting Standing

## 2024-06-15 NOTE — ED INITIAL ASSESSMENT (HPI)
Patient to the ED via [facility] from home where he experienced a syncopal episode. Patient only speaks Upper sorbian. Patient has hx of CKD, with kidney removal. Patient also had recent cardiac stents placed. Patient has rec'd care at both [facility] and [facility].     Per grandamberly, unresponsive for 15 seconds at home. Rec's iron transfusions at [facility] on Tuesday. Has not had a strong appetite. Goes to PT, most recent yesterday.     Per grandson did not hit head.

## 2024-06-15 NOTE — PLAN OF CARE
NURSING ADMISSION NOTE      Patient admitted via Cart  Oriented to room.  Safety precautions initiated.  Bed in low position.  Call light in reach.    Assumed pt care this morning at 0730.  Pt is A&Ox4, following commands.   Pt on room air, VSS.  SB on tele.  Pt denies pain.  Pt up ad bridget.  Pt on carb control diet. Tolerating diet.   L forearm NS at 100 mL/hr.  Bed in lowest position, call light in reach.

## 2024-06-15 NOTE — PHYSICAL THERAPY NOTE
PHYSICAL THERAPY EVALUATION - INPATIENT     Room Number: 3604/3604-A  Evaluation Date: 6/15/2024  Type of Evaluation: Initial  Physician Order: PT Eval and Treat    Presenting Problem: syncopal episode  Co-Morbidities : anemia, CKD4, CAD, hyponatremia  Reason for Therapy: Mobility Dysfunction and Discharge Planning    Recent Admission:  4/23-4/26/24: chest pain of unknown etiology --> [facility]    PHYSICAL THERAPY ASSESSMENT   Patient is currently functioning near baseline with bed mobility, transfers, gait, maintaining seated position, and standing prolonged periods.  Prior to admission, patient's baseline is Alex with RW.  Patient is requiring contact guard assist as a result of the following impairments: decreased functional strength, decreased endurance/aerobic capacity, impaired static and dynamic standing balance, impaired coordination, impaired motor planning, decreased muscular endurance, and medical status.  Physical Therapy will continue to follow for duration of hospitalization.    Patient will benefit from continued skilled PT Services at discharge to promote functional independence and safety with additional support and return home with home health PT.    PLAN  PT Treatment Plan: Bed mobility;Body mechanics;Coordination;Endurance;Energy conservation;Patient education;Family education;Gait training;Neuromuscular re-educate;Range of motion;Strengthening;Transfer training;Balance training  Rehab Potential : Good  Frequency (Obs): 3-5x/week  Number of Visits to Meet Established Goals: 4      CURRENT GOALS    Goal #1 Patient is able to demonstrate supine - sit EOB @ level: modified independent     Goal #2 Patient is able to demonstrate transfers Sit to/from Stand at assistance level: modified independent     Goal #3 Patient is able to ambulate 50 feet with assist device: walker - rolling at assistance level: supervision     Goal #4 Pt able to ambulate 100 feet w/ RW at supervision.   Goal #5    Goal #6    Goal  Comments: Goals established on 6/15/2024    PHYSICAL THERAPY MEDICAL/SOCIAL HISTORY  History related to current admission: Patient is a 83 year old male admitted on 6/15/2024 from home for syncopal episode.    HOME SITUATION  Type of Home: House   Home Layout: Two level;Ramped entrance;Elevator                Lives With: Spouse;Son  Drives: No  Patient Owned Equipment: Rolling walker;Cane  Patient Regularly Uses: None    Prior Level of [place]: Per grandson (providing info as pt is Welsh speaking)- pt lives in two story home with ramped entrance in garage, and elevator access to second floor. Lives with spouse, son, daughter in law, and grandkids. Ambulates with RW for household distances. Uses wheelchair for community distances. Does not leave home much. Sometimes needs Karina to transfer sit>stand from surfaces. Sleeps in adjustable bed. No recent falls. Does not drive.     SUBJECTIVE  \"Dizzy\"    OBJECTIVE  Precautions: Bed/chair alarm; needed (Welsh speaking)  Fall Risk: High fall risk    WEIGHT BEARING RESTRICTION  Weight Bearing Restriction: None                PAIN ASSESSMENT  Rating: Unable to rate  Location: posterior neck (family reports chronic neck pain)  Management Techniques: Activity promotion;Body mechanics;Repositioning    COGNITION  Overall Cognitive Status:  WFL - within functional limits    RANGE OF MOTION AND STRENGTH ASSESSMENT  Upper extremity ROM and strength are within functional limits   Lower extremity ROM is within functional limits   Lower extremity strength is within functional limits     BALANCE  Static Sitting: Fair +  Dynamic Sitting: Fair  Static Standing: Fair -  Dynamic Standing: Fair -    ADDITIONAL TESTS                                    ACTIVITY TOLERANCE                         O2 WALK       NEUROLOGICAL FINDINGS                        AM-PAC '6-Clicks' INPATIENT SHORT FORM - BASIC MOBILITY  How much difficulty does the patient currently have...  Patient  Difficulty: Turning over in bed (including adjusting bedclothes, sheets and blankets)?: A Little   Patient Difficulty: Sitting down on and standing up from a chair with arms (e.g., wheelchair, bedside commode, etc.): A Little   Patient Difficulty: Moving from lying on back to sitting on the side of the bed?: A Little   How much help from another person does the patient currently need...   Help from Another: Moving to and from a bed to a chair (including a wheelchair)?: A Little   Help from Another: Need to walk in hospital room?: A Little   Help from Another: Climbing 3-5 steps with a railing?: A Lot       AM-PAC Score:  Raw Score: 17   Approx Degree of Impairment: 50.57%   Standardized Score (AM-PAC Scale): 42.13   CMS Modifier (G-Code): CK    FUNCTIONAL ABILITY STATUS  Gait Assessment   Functional Mobility/Gait Assessment  Gait Assistance: Contact guard assist  Distance (ft): 50  Assistive Device: Rolling walker  Pattern: Shuffle    Skilled Therapy Provided     Bed Mobility:  Rolling: NT  Supine to sit: w/ HOB slightly elevated (pt sleeps in adjustable bed) - CGA to reach sitting foot flat position EOB  Sit to supine: NT     Transfer Mobility:  Sit to stand: CGA to RW- v/c for hand placement   Stand to sit: CGA  Gait = CGA w/ RW x 50 feet, shuffle pattern. Standing rest breaks due to dizziness.     Therapist's Comments:   Patient presents sitting up in bed. Grandson present in room at beginning of session, multiple family members present as session went on. Discussed role and goal of physical therapy in hospital setting. Pt in agreement to session. At rest denies pain, but with movement c/o posterior neck pain (family reports chronic in nature). Bed mobility with HOB elevated a bit, at CGA. Sitting EOB, pt endorses dizziness but it subsided over a minute or two. Sit to stand CGA to RW. Ambulated 50 feet w/ RW at CGA. About FPC through ambulation pt reporting incr dizziness- encouraged to stop and focus on  single point/object/keep eyes open. Pt wanting to get back to room to sit down. BP taken in sitting at 86/39- pt c/o significant dizziness. Reclined in chair. BP 4 minutes later at 106/43- RN aware. Pt upright in chair at end of session. Discussed importance of continued out of bed mobility. Encouraged continued ambulation with nursing staff. Pt verbalizes understanding.    Exercise/Education Provided:  Bed mobility  Body mechanics  Energy conservation  Functional activity tolerated  Gait training  Posture  Transfer training    Patient End of Session: Up in chair;Needs met;Call light within reach;RN aware of session/findings;All patient questions and concerns addressed;Family present;Discussed recommendations with /      Patient Evaluation Complexity Level:  History Moderate - 1 or 2 personal factors and/or co-morbidities   Examination of body systems Low - addressing 1-2 elements   Clinical Presentation Moderate - Evolving   Clinical Decision Making Moderate - Evolving     PT Session Time: 30 minutes  Gait Trainin minutes  Therapeutic Activity: 5 minutes

## 2024-06-15 NOTE — ED PROVIDER NOTES
Patient Seen in: [facility] Emergency Department      History     Chief Complaint   Patient presents with    Syncope     Stated Complaint: Syncope in bathroom    Subjective:   HPI    83-year-old male with history of chronic kidney disease and recent cardiac stent placement noted to have an episode of syncope.  Family says that the patient was feeling dizzy throughout the day and had an episode of fainting while in the bathroom.  The patient been having some nausea and vomiting.  Denies any chest pain complains of some neck pain.  No reported head injury with this fall.  Family says the patient received an iron transfusion and advocate on 6/11/2024 on review of his records due to his anemia.  He was told he may have some nausea after the infusions.  He denies any chest pain or palpitations.  Paramedics were called and brought the patient to the hospital for evaluation because of the episode of syncope.    Objective:   Past Medical History:    Arthritis    Congestive heart disease (HCC)    Diabetes (HCC)    Essential hypertension    High blood pressure    High cholesterol    History of blood clots    Muscle weakness    Renal disorder    Sleep apnea    non compliant with CPAP              Past Surgical History:   Procedure Laterality Date    Nephrectomy N/A     Other surgical history N/A     nephrectomy                Social History     Socioeconomic History    Marital status:    Tobacco Use    Smoking status: Never    Smokeless tobacco: Never   Vaping Use    Vaping status: Never Used   Substance and Sexual Activity    Alcohol use: Never    Drug use: Never     Social Determinants of Health     Food Insecurity: No Food Insecurity (4/23/2024)    Food Insecurity     Food Insecurity: Never true   Transportation Needs: No Transportation Needs (4/23/2024)    Transportation Needs     Lack of Transportation: No   Housing Stability: Low Risk  (4/23/2024)    Housing Stability     Housing Instability: No               Review of Systems    Positive for stated complaint: Syncope in bathroom  Other systems are as noted in HPI.  Constitutional and vital signs reviewed.      All other systems reviewed and negative except as noted above.    Physical Exam     ED Triage Vitals [06/15/24 1225]   BP 90/51   Pulse (!) 49   Resp 12   Temp 96.5 °F (35.8 °C)   Temp src Temporal   SpO2 100 %   O2 Device None (Room air)       Current Vitals:   Vital Signs  BP: 118/48  Pulse: 56  Resp: 13  Temp: 96.5 °F (35.8 °C)  Temp src: Temporal  MAP (mmHg): 70    Oxygen Therapy  SpO2: 100 %  O2 Device: None (Room air)            Physical Exam  General: This a pleasant nontoxic appearing patient in no apparent distress alert and oriented ×3  HEENT: Pupils are equal reactive to light.  Extra ocular motions are intact.  No scleral icterus or conjunctival pallor: Neck is supple without tenderness on palpation.  Head is atraumatic normocephalic.  Oral mucosa moist.  Tongue is midline.  No posterior pharyngeal lesions.    Lungs: Clear to auscultation bilaterally.  No wheezes, rhonchi, or rales appreciated.  No accessory muscle use noted for breathing.  Cardiac: Regular rate and rhythm.  Normal S1 and 2 without murmurs or ectopy appreciated  Abdomen: Soft on examination without tenderness to deep palpation or to percussion.  No masses appreciated.  Bowel sounds are normoactive.  No CVA tenderness.  Extremities: No cyanosis, no edema or clubbing.  Pulses are +2.  Full range of motion is noted of the extremities without deformities.  No tenderness.  Neurologically intact.       ED Course     Labs Reviewed   COMP METABOLIC PANEL (14) - Abnormal; Notable for the following components:       Result Value    Glucose 130 (*)     Sodium 131 (*)     BUN 47 (*)     Creatinine 3.00 (*)     eGFR-Cr 20 (*)     AST 41 (*)     A/G Ratio 0.9 (*)     All other components within normal limits   CBC W/ DIFFERENTIAL - Abnormal; Notable for the following components:    RBC 3.22  (*)     HGB 10.2 (*)     HCT 28.8 (*)     All other components within normal limits   TROPONIN I HIGH SENSITIVITY - Normal   CBC WITH DIFFERENTIAL WITH PLATELET    Narrative:     The following orders were created for panel order CBC With Differential With Platelet.  Procedure                               Abnormality         Status                     ---------                               -----------         ------                     CBC W/ DIFFERENTIAL[[id number]]          Abnormal            Final result                 Please view results for these tests on the individual orders.   RAINBOW DRAW LAVENDER   RAINBOW DRAW LIGHT GREEN   RAINBOW DRAW BLUE     EKG    Rate, intervals and axes as noted on EKG Report.  Rate: 53  Rhythm: Sinus Rhythm  Reading: Sinus bradycardia otherwise normal EKG         Narrative  PROCEDURE:  XR CHEST AP PORTABLE  (CPT=71045)     TECHNIQUE:  AP chest radiograph was obtained.     COMPARISON:  [facility] , XR, XR CHEST AP PORTABLE  (CPT=71045), 4/02/2024, 6:19 PM.     INDICATIONS:  Syncope in bathroom     PATIENT STATED HISTORY: (As transcribed by Technologist)  Patient's family shares that the patient was dizzy and was vomiting earlier this morning.                         Impression  CONCLUSION:       Stable cardiac and mediastinal contours.  No pulmonary edema or focal airspace consolidation.  The pleural spaces are clear.        LOCATION:  [facility]                 Dictated by (CST): Lucille Soriano MD on 6/15/2024 at 1:58 PM      Finalized by (CST): Lucille Soriano MD on 6/15/2024 at 1:59 PM                   MDM    Differential diagnosis includes but is not limited to cardiac ischemia, cardiac arrhythmia, electrolyte abnormality, symptomatic anemia, vasomotor syncope.  Admission disposition: 6/15/2024  1:10 PM       With the patient's significant risks of congestive heart failure, cardiac disease, and age he would benefit from a period of observation in the hospital with cardiology  consultation.  Troponin was normal.  Hemoglobin of 10.2 hematocrit 28.8.  Glucose 130s of 131.  BUN of 47 creatinine 3.0.  I contacted the hospitalist service to arrange for admission.  Cardiology service will be contacted.  The patient was admitted to the hospital for continued care.  I reviewed the x-rays and agree with the radiologist report that showed stable cardiomediastinal contours.  No pulmonary edema or focal airspace consolidation.  The pleural spaces are clear.  The patient was admitted to the hospital for continued care.  Admitted in good condition.                         Medical Decision Making      Disposition and Plan     Clinical Impression:  1. Syncope and collapse    2. CKD (chronic kidney disease), stage IV (HCC)    3. Hyponatremia         Disposition:  Admit  6/15/2024  1:10 pm    Follow-up:  No follow-up provider specified.        Medications Prescribed:  Current Discharge Medication List                            Hospital Problems       Present on Admission  Date Reviewed: 3/29/2023            ICD-10-CM Noted POA    * (Principal) Syncope and collapse R55 6/15/2024 Unknown    Acute kidney injury (HCC) N17.9 6/15/2024 Yes

## 2024-06-16 ENCOUNTER — APPOINTMENT (OUTPATIENT)
Dept: CV DIAGNOSTICS | Facility: HOSPITAL | Age: 84
End: 2024-06-16
Attending: INTERNAL MEDICINE

## 2024-06-16 VITALS
SYSTOLIC BLOOD PRESSURE: 122 MMHG | TEMPERATURE: 98 F | BODY MASS INDEX: 29 KG/M2 | DIASTOLIC BLOOD PRESSURE: 51 MMHG | OXYGEN SATURATION: 100 % | WEIGHT: 171.94 LBS | HEART RATE: 54 BPM | RESPIRATION RATE: 16 BRPM

## 2024-06-16 LAB
ANION GAP SERPL CALC-SCNC: 7 MMOL/L (ref 0–18)
BASOPHILS # BLD AUTO: 0.03 X10(3) UL (ref 0–0.2)
BASOPHILS NFR BLD AUTO: 0.4 %
BUN BLD-MCNC: 45 MG/DL (ref 9–23)
CALCIUM BLD-MCNC: 8.2 MG/DL (ref 8.5–10.1)
CHLORIDE SERPL-SCNC: 107 MMOL/L (ref 98–112)
CO2 SERPL-SCNC: 22 MMOL/L (ref 21–32)
CREAT BLD-MCNC: 2.76 MG/DL
EGFRCR SERPLBLD CKD-EPI 2021: 22 ML/MIN/1.73M2 (ref 60–?)
EOSINOPHIL # BLD AUTO: 0.25 X10(3) UL (ref 0–0.7)
EOSINOPHIL NFR BLD AUTO: 3.2 %
ERYTHROCYTE [DISTWIDTH] IN BLOOD BY AUTOMATED COUNT: 13.7 %
GLUCOSE BLD-MCNC: 102 MG/DL (ref 70–99)
GLUCOSE BLD-MCNC: 136 MG/DL (ref 70–99)
GLUCOSE BLD-MCNC: 95 MG/DL (ref 70–99)
HCT VFR BLD AUTO: 25.5 %
HGB BLD-MCNC: 9.1 G/DL
IMM GRANULOCYTES # BLD AUTO: 0.03 X10(3) UL (ref 0–1)
IMM GRANULOCYTES NFR BLD: 0.4 %
LYMPHOCYTES # BLD AUTO: 2.57 X10(3) UL (ref 1–4)
LYMPHOCYTES NFR BLD AUTO: 33.2 %
MCH RBC QN AUTO: 32.4 PG (ref 26–34)
MCHC RBC AUTO-ENTMCNC: 35.7 G/DL (ref 31–37)
MCV RBC AUTO: 90.7 FL
MONOCYTES # BLD AUTO: 0.93 X10(3) UL (ref 0.1–1)
MONOCYTES NFR BLD AUTO: 12 %
NEUTROPHILS # BLD AUTO: 3.94 X10 (3) UL (ref 1.5–7.7)
NEUTROPHILS # BLD AUTO: 3.94 X10(3) UL (ref 1.5–7.7)
NEUTROPHILS NFR BLD AUTO: 50.8 %
NT-PROBNP SERPL-MCNC: 937 PG/ML (ref ?–450)
OSMOLALITY SERPL CALC.SUM OF ELEC: 294 MOSM/KG (ref 275–295)
PLATELET # BLD AUTO: 176 10(3)UL (ref 150–450)
POTASSIUM SERPL-SCNC: 4.6 MMOL/L (ref 3.5–5.1)
POTASSIUM SERPL-SCNC: 4.6 MMOL/L (ref 3.5–5.1)
RBC # BLD AUTO: 2.81 X10(6)UL
SODIUM SERPL-SCNC: 136 MMOL/L (ref 136–145)
WBC # BLD AUTO: 7.8 X10(3) UL (ref 4–11)

## 2024-06-16 PROCEDURE — 93306 TTE W/DOPPLER COMPLETE: CPT | Performed by: INTERNAL MEDICINE

## 2024-06-16 PROCEDURE — 84132 ASSAY OF SERUM POTASSIUM: CPT | Performed by: HOSPITALIST

## 2024-06-16 PROCEDURE — 82962 GLUCOSE BLOOD TEST: CPT

## 2024-06-16 PROCEDURE — 85025 COMPLETE CBC W/AUTO DIFF WBC: CPT | Performed by: HOSPITALIST

## 2024-06-16 PROCEDURE — 94799 UNLISTED PULMONARY SVC/PX: CPT

## 2024-06-16 PROCEDURE — 83880 ASSAY OF NATRIURETIC PEPTIDE: CPT | Performed by: INTERNAL MEDICINE

## 2024-06-16 PROCEDURE — 80048 BASIC METABOLIC PNL TOTAL CA: CPT | Performed by: HOSPITALIST

## 2024-06-16 RX ORDER — CARVEDILOL 3.12 MG/1
3.12 TABLET ORAL 2 TIMES DAILY WITH MEALS
Status: DISCONTINUED | OUTPATIENT
Start: 2024-06-16 | End: 2024-06-16

## 2024-06-16 NOTE — PROGRESS NOTES
[facility]   part of [facility]    Progress Note     Bonifacio Mccoy Patient Status:  Observation    1940 MRN [id number]   Location [facility] 3NE-A Attending Pete Sandoval MD   Hosp Day # 0 PCP Hiram Nugent MD     Follow up for: The primary encounter diagnosis was Syncope and collapse. Diagnoses of CKD (chronic kidney disease), stage IV (HCC) and Hyponatremia were also pertinent to this visit.      Interval History/Subjective:     MERCEDES overnight  Afebrile, HDS heart rate stable in the 50s overnight  Serial troponins negative  Cr improved 2.76    Feels well, no further weakness/instability, hopeful to go home today    Vital signs:  Temp:  [96.5 °F (35.8 °C)-98.2 °F (36.8 °C)] 98.2 °F (36.8 °C)  Pulse:  [49-62] 51  Resp:  [0-18] 18  BP: ()/(39-58) 113/55  SpO2:  [100 %] 100 %    Physical Exam:    General: NAD, Comfortable, Nontoxic   Respiratory: CTAB; reg resp rate & effort, no wheezes/crackles  Cardiovascular: S1, S2. Regular rate and rhythm. No murmurs appreciated  Abdomen: Soft, NTND, no guarding/rebound   Neurologic: No focal neurological deficits.   Extremities: No edema.   Skin: Dry, no rashes, ulcers or lesions     Diagnostic Data:      Labs:  Recent Labs   Lab 06/15/24  1233 24  0705   WBC 8.8 7.8   HGB 10.2* 9.1*   MCV 89.4 90.7   .0 176.0       Recent Labs   Lab 06/15/24  1233 24  0705   * 102*   BUN 47* 45*   CREATSERUM 3.00* 2.76*   CA 8.9 8.2*   ALB 3.4  --    * 136   K 3.8 4.6  4.6   CL 99 107   CO2 22.0 22.0   ALKPHO 99  --    AST 41*  --    ALT 41  --    BILT 0.9  --    TP 7.2  --        No results for input(s): \"PTP\", \"INR\" in the last 168 hours.    No results for input(s): \"TROP\", \"CK\" in the last 168 hours.         Imaging: Imaging data reviewed in Epic.    Medications:    allopurinol  300 mg Oral Daily    aspirin  81 mg Oral Daily    atorvastatin  80 mg Oral Nightly    calcitriol  0.25 mcg Oral Daily    clopidogrel  75 mg  Oral Daily    ezetimibe  10 mg Oral Nightly    insulin degludec  8 Units Subcutaneous Nightly    pantoprazole  40 mg Oral QAM AC    tamsulosin  0.4 mg Oral Daily @ 0700    heparin  5,000 Units Subcutaneous Q8H JEFERSON    potassium chloride  20 mEq Intravenous Once    insulin aspart  1-5 Units Subcutaneous TID AC and HS       ASSESSMENT / PLAN:     Bonifacio Mccoy Is a a 83 year old male who presents with syncopal episode, suspect due to hypotension secondary to dehydration     Problem List / Diagnoses     Syncope  Hypotension  JIMI on CKD 3B  CAD  Chronic diastolic CHF  Bradycardia  HTN/HL  DM2     Plan     Syncope  Hypotension  -Suspect secondary to hypotension in the setting of dehydration, due to overdiuresis  - Give 500 cc normal saline x 1, continue maintenance fluids at 100 mL/h x 12 hours  - Repeat orthostatics this morning borderline, encourage p.o. intake, continue holding diuretics today  - Bradycardia may also be contributor, holding Coreg and all antihypertensives  -Cardiology consulted, plan discussed; check echo  - Serial troponins negative effectively ruling out ACS;   - Continue to monitor on telemetry  - PT/OT -> home     JIMI on CKD 3B-improved  -Creatinine 3.0, baseline 2.2-2.6  - Likely secondary to overdiuresis, per above  - Renally dose meds, avoid nephrotoxic agents  - Holding ARB  - Creatinine improved with gentle IV fluids, encourage hydration, hold ARB and diuretics today, can likely resume at discharge     CAD  Chronic diastolic CHF  Bradycardia  HTN/HL  -Appears dry on exam  -Holding ARB, diuretics per above  - Strict I/O's, daily weights  - Resume aspirin, statin, Plavix  -Home Coreg held due to bradycardia, discussed with cardiology, hold coreg for now, to follow-up with cardiologist outpatient     DM2  -Resume home long-acting insulin 8 units nightly  - Holding oral antihyperglycemics while admitted  - Accu-Cheks, SSI-low       DVT Mechanical Prophylaxis:   SCDs,    DVT Pharmacologic  Prophylaxis   Medication    heparin (Porcine) 5000 UNIT/ML injection 5,000 Units      DVT Pharmacologic prophylaxis: Aspirin 162 mg       Code Status: FULL     Dispo: observation; CHANTEL later today pending normal echo, clinical stability     Plan of care discussed with patient and/or family at bedside.    DEONNA Sandoval MD  [facility]   [phone]      This note was created using voice recognition technology. It may include inadvertent transcriptional errors. Any such errors should be contextually interpreted and should not be taken to alter the content or the meaning.     Note to Patient: The 21st Century Cures Act makes medical notes like these available to patients in the interest of transparency. However, be advised this is a medical document. It is intended as peer to peer communication. It is written in medical language and may contain abbreviations or verbiage that are unfamiliar. It may appear blunt or direct. Medical documents are intended to carry relevant information, facts as evident, and the clinical opinion of the practitioner and not intended to be the primary source of your information.  Please refer directly to myself or clinical staff for information regarding plan of care.

## 2024-06-16 NOTE — PROGRESS NOTES
06/16/24 0854 06/16/24 0855 06/16/24 0856   Vitals   /51 94/46 112/52   MAP (mmHg) 70 (!) 61 70   BP Location Right arm Right arm Right arm   BP Method Automatic Automatic Automatic   Patient Position Lying Sitting Standing

## 2024-06-16 NOTE — CONSULTS
[facility] Cardiology  Report of Consultation    Bonifacio Mccoy Patient Status:  Observation    1940 MRN [id number]   [facility] 3NE-A Attending Pete Sandoval MD   Hosp Day # 0 PCP Hiram Nugent MD     Reason for Consultation:   Near syncope.    History of Present Illness:   Bonifacio Mccoy is a(n) 83 year old male with a histroy of CKD 4, CAD (s/p PCI by Johnson), HTN, dyslipidemia who had a near syncopal event (actually 2 such events).  The history was provided by the patient and his daughter who provided some translation although the patient appears to understand english well.    He was awake late at night.  Evidently he sleeps very little and is usually awake all night and on the phone with friends in [place].  He may have taken a second dose of his diuretic.  He then went to the bath room and was urinating and became dizzy and fell down.  He was able to use his walker to get up and went to the bed and a relative was there with him.  He ate 1/2 a slice of delbert and vomited it up.  He became very dizzy and then fell back in bed.  He was not unconscious, however was \"out of it\" for about 20s.  The dizziness then passed, EMS was called, and he was taken to the ER.    He notes no preceding symptoms.  Specifically no SOB, CP, palpitations.    He denies significant palpitations at baseline.  He has not had a history of falls.    The family is pretty sure he took an extra dose of diuretic.  Evidently the patient follows his own urine output an takes an extra dose of diuretic when it drops. In this case, he likely was dehydrated to start and took an extra dose of lasix, to which the patient appears to agree.    He is currently doing well.  He has no CP, SOLANO, or dizziness.    At baseline he can walk from parking lot to store or MD office without difficulty or limitation.  No CP/angina (and since recent PCI) no SOLANO.    Past Medical History:   Past Medical History:    Arthritis     Congestive heart disease (HCC)    Diabetes (HCC)    Essential hypertension    High blood pressure    High cholesterol    History of blood clots    Muscle weakness    Renal disorder    Sleep apnea    non compliant with CPAP       Social History:   Smoking:  None  Alcohol:  None    Family History:   No family history of premature arthrosclerotic heart disease     Medications:   Scheduled:    allopurinol  300 mg Oral Daily    aspirin  81 mg Oral Daily    atorvastatin  80 mg Oral Nightly    calcitriol  0.25 mcg Oral Daily    clopidogrel  75 mg Oral Daily    ezetimibe  10 mg Oral Nightly    insulin degludec  8 Units Subcutaneous Nightly    pantoprazole  40 mg Oral QAM AC    tamsulosin  0.4 mg Oral Daily @ 0700    heparin  5,000 Units Subcutaneous Q8H JEFERSON    potassium chloride  20 mEq Intravenous Once    insulin aspart  1-5 Units Subcutaneous TID AC and HS       Continuous Infusion:       PRN Medications:     ondansetron    metoclopramide    acetaminophen    Outpatient Medications:   Current Facility-Administered Medications on File Prior to Encounter   Medication Dose Route Frequency Provider Last Rate Last Admin    [] sodium chloride 0.9% infusion   Intravenous Continuous Michael Peterson MD 83 mL/hr at 24 1103 New Bag at 24 1103    [COMPLETED] lidocaine PF (Xylocaine-MPF) 1 % injection             [COMPLETED] heparin (Porcine) 5000 UNIT/ML injection             [COMPLETED] heparin (Porcine) 5000 UNIT/ML injection             [COMPLETED] fentaNYL (Sublimaze) 50 mcg/mL injection             [COMPLETED] midazolam (Versed) 2 MG/2ML injection             [COMPLETED] verapamil (Isoptin) 2.5 mg/mL injection             [COMPLETED] Nitroglycerin in D5W 200-5 MCG/ML-% injection             [COMPLETED] epoetin shannan (Epogen, Procrit) 98359 UNIT/ML injection 20,000 Units  20,000 Units Subcutaneous Once Michael Peterson MD   20,000 Units at 24 0935    [COMPLETED] iodixanol (VISIPAQUE) 320 MG/ML injection  100 mL  100 mL Injection ONCE PRN Michael Ornelas MD   50 mL at 24 1555    [COMPLETED] lidocaine PF (Xylocaine-MPF) 1 % injection             [COMPLETED] heparin (Porcine) 5000 UNIT/ML injection             [] ondansetron (Zofran) 4 MG/2ML injection 4 mg  4 mg Intravenous Q4H PRN Lalo Neal MD        [] sodium chloride 0.9% infusion   Intravenous On Call Tuyet White APRN        [COMPLETED] aspirin DR tab 325 mg  325 mg Oral Once Tuyet White APRN   325 mg at 24 1303    [] sodium chloride 0.9% infusion   Intravenous Continuous Michael Peterson MD   Stopped at 24 1800    [COMPLETED] aspirin chewable tab 81 mg  81 mg Oral Once Lalo Neal MD   81 mg at 24 192    [COMPLETED] famotidine (Pepcid) 20 mg/2mL injection 20 mg  20 mg Intravenous Once Lalo Neal MD   20 mg at 24     Current Outpatient Medications on File Prior to Encounter   Medication Sig Dispense Refill    torsemide 20 MG Oral Tab Take 1 tablet (20 mg total) by mouth daily.      ezetimibe 10 MG Oral Tab Take 1 tablet (10 mg total) by mouth nightly.      midodrine 5 MG Oral Tab Take 1 tablet (5 mg total) by mouth in the morning and 1 tablet (5 mg total) at noon and 1 tablet (5 mg total) in the evening.      famotidine 20 MG Oral Tab Take 1 tablet (20 mg total) by mouth nightly as needed for Heartburn.      aspirin 81 MG Oral Chew Tab Chew 1 tablet (81 mg total) by mouth daily. 30 tablet 0    insulin glargine 100 UNIT/ML Subcutaneous Solution Inject 8 Units into the skin nightly.      atorvastatin 80 MG Oral Tab Take 1 tablet (80 mg total) by mouth nightly.      clopidogrel 75 MG Oral Tab Take 1 tablet (75 mg total) by mouth daily.      calcitriol 0.25 MCG Oral Cap Take 1 capsule (0.25 mcg total) by mouth every other day.      Vitamin D3, Cholecalciferol, 25 MCG (1000 UT) Oral Tab Take 1 tablet (1,000 Units total) by mouth daily.      tamsulosin HCl 0.4 MG Oral Cap Take 1 capsule  (0.4 mg total) by mouth daily.      tamsulosin 0.4 MG Oral Cap Take 1 capsule (0.4 mg total) by mouth As Directed.      allopurinol 100 MG Oral Tab Take 1 tablet (100 mg total) by mouth as needed (1 tablet daily).         Allergies:   Allergies   Allergen Reactions    Losartan UNKNOWN    Lisinopril ITCHING       Review of Systems:   No fevers, chills, change in weight or bowel habits.  Ten point review of systems is otherwise negative or unremarkable.    Physical Exam:   Vitals:    06/16/24 0856   BP: 112/52   Pulse: 62   Resp:    Temp:      Wt Readings from Last 3 Encounters:   06/15/24 171 lb 15.3 oz (78 kg)   04/26/24 171 lb 15.3 oz (78 kg)   04/02/24 180 lb (81.6 kg)           General: Well developed, well nourished male.  Pt is in no acute distress.  HEENT:   Normocephalic.  Atraumatic.  Eyes with no scleral icterus.  Neck: Supple.  No JVD.  Carotids 2+ and equal in symmetric fashion.  No bruits are noted.  Cardiac: Regular rate and rhythm.   There is a normal S1 and S2.  No S3 or S4.  No murmurs, rubs, or gallops.  PMI is non-displaced with a normal apical impulse.  Lungs: Clear to ascultation bilaterally.  No focal rales, rhonchi, or wheezes.  Good air movement is noted throughout all lung fields.  Abdomen: Soft.  Non-distended.  Non-tender.  Bowel sounds are present and normoactive.  No guarding or rebound.   Extremities: Extremities do not demonstrate any evidence of peripheral edema.   No cyanosis or clubbing of the digits is appreciated.  Femoral, Dorsalis Pedis, and Posterior Tibialis  pulses are 2+ and equal in a symmetric fashion.  Neurologic: Alert and oriented, normal affect.  No gross deficit appreciated.  Integument:  No visible rashes are appreciated.      Laboratories and Data:   Labs:    Recent Labs   Lab 06/15/24  1233 06/16/24  0705   * 102*   BUN 47* 45*   CREATSERUM 3.00* 2.76*   CA 8.9 8.2*   ALB 3.4  --    * 136   K 3.8 4.6  4.6   CL 99 107   CO2 22.0 22.0   ALKPHO 99  --     AST 41*  --    ALT 41  --    BILT 0.9  --    TP 7.2  --        Recent Labs   Lab 06/15/24  1233 06/16/24  0705   RBC 3.22* 2.81*   HGB 10.2* 9.1*   HCT 28.8* 25.5*   MCV 89.4 90.7   MCH 31.7 32.4   MCHC 35.4 35.7   RDW 13.5 13.7   NEPRELIM 4.64 3.94   WBC 8.8 7.8   .0 176.0       No results for input(s): \"PTP\", \"INR\" in the last 168 hours.    No results for input(s): \"TROP\", \"CK\" in the last 168 hours.    Diagnostics:   Tele: SB, rate 48.  EKG: SB, rate 53.  No sig abnormalities.  Echo:   2021  1. Left ventricle: The cavity size was normal. Wall thickness was normal.      Systolic function was normal. The estimated ejection fraction was 65-70%.   2. Mitral valve: Mildly calcified annulus.   3. Left atrium: The left atrium was mildly enlarged.   Cath:   4/2024  Findings:  1. Left main: No stenosis  2. LAD: Proximal 75% diffuse moderately calcified plaque, short segment mid intramyocardial with mild systolic compression, several small diagonal branches  3. Left circumflex: Gives off high OM1 with 75% proximal plaquing, then  prox LCX.  4. RCA: Proximal to mid diffuse severe disease, worst > 90%. PDA and RPL luminal irregularities. Distal RPL supplies grade 3 collaterals to LCX backfilling to proximal OM.   5. Hemodynamics: LVEDP 10 mmHg. No LV-Ao gradient on pullback.   Conclusions / Recommendations:  1. Severe multivessel CAD, some progression since last angiogram from March 2023. We treated the more critical stenosis in the RCA today, which also supplies collaterals to the occluded LCX. The LAD has stably severe disease.    2. S/p IVUS guided PCI of prox-mid RCA with LEXIE x 1 (3.0 x 38 mm Synergy), post dilated 3.5 NC high pressure.   3. We used only 15 ml contrast, relying mostly on landmark and IVUS rather than angiography. I think his renal function should stay stable. We gave another 800 ml NS during the procedure as his LVEDP was normal ~ 10 mmHg.   4. Consider bringing him back after a month to  treat the LAD. The OM1 is more of a medium sized branch and could be left for medical therapy if he is not suffering debilitating angina. .   5. Remove the TR band per protocol  6. Continue DAPT, statin, medical therapy      Assessment:  1. Pre-syncope  2. Dizziness  3. Insomnia  4. CKD4  5. CAD, s/p PCI with severe disease  6. Bradycardia (50s usually)  7. Anemia      Plan:  1. Pre-syncope/dizziness: Likely due to dehydration. BP has improved with IVF.  No urine sodium or BNP drawn but seems likely.  Will check echo.  Will ambulate and check orthostatics.  He appears to be on midodrine 5mg TID PRN.  Will check orthostatics and ambulate.  If echo ok and BP improved with good ambulation and no dizziness, can consider DC this afternoon..    2. CAD: FU for PCI with Dr. Ornelas.  DAPT.  No signs/symptoms c/w ACS.  Echo pending.  3. CKD: Follows with nephrology.  4. Bradycardia: HR high 40s-50s.  Usuallly this is not sufficient to cause dizziness or syncope.  Will check echo.  Is not on BB as far as I can tell (they are noted in prior clinic notes but not currently on list).  Would avoid rate controlling agents of BB for now.  If he somehow is on one, cut dose in 1/2.    [facility] MD Darryl Ortiz MD  6/16/2024  9:24 AM

## 2024-06-16 NOTE — CM/SW NOTE
HOME SITUATION  Type of Home: House   Home Layout: Two level;Ramped entrance;Elevator   Lives With: Spouse;Son  Drives: No  Patient Owned Equipment: Rolling walker;Cane  Patient Regularly Uses: None  (PT evaluation note)     Patient admitted from home with family for syncopal episode.  Therapy recommending home health PT for discharge.  Patient has recent history of home health services with [facility] per chart review; CM sent referral to agency in aidin system.  CM/SW will follow up to confirm services for discharge.     Update:  1430: CM called [facility] ([phone]) for referral status update, left message for call back. Will endorse to care coordination team for follow up tomorrow.     1630: CM reopened aidin  referral and sent to [facility] per patient request.    1800: [facility]  accepted aidin referral, agency reserved and follow up requested.  CM called patient's home number ([phone]) for update, left message for call back.       Morenita Izaguirre RN Case Manager [phone]

## 2024-06-16 NOTE — DISCHARGE INSTRUCTIONS
A Home Health referral has been sent to [facility] [employer]. For review.  Home Health services are pending agency confirmation at this time.  Please call agency at [phone] for follow up.

## 2024-06-16 NOTE — PLAN OF CARE
Problem: CARDIOVASCULAR - ADULT  Goal: Maintains optimal cardiac output and hemodynamic stability  Description: INTERVENTIONS:  - Monitor vital signs, rhythm, and trends  - Monitor for bleeding, hypotension and signs of decreased cardiac output  - Evaluate effectiveness of vasoactive medications to optimize hemodynamic stability  - Monitor arterial and/or venous puncture sites for bleeding and/or hematoma  - Assess quality of pulses, skin color and temperature  - Assess for signs of decreased coronary artery perfusion - ex. Angina  - Evaluate fluid balance, assess for edema, trend weights  6/16/2024 1534 by Margarette Landa, RN  Outcome: Completed  6/16/2024 1017 by Margarette Landa, RN  Outcome: Progressing

## 2024-06-16 NOTE — PLAN OF CARE
Pt is A&O x4. Primarily A[place]i speaking. Prefers family members interpret. VSS- SB on tele. RA. CYRUS- CPAP @ noc. Lung sounds clear. Pt denies pain/nausea/SOB. Pt endorsed dizziness when standing. BP remains stable. IVF- 0.9NS @ 100mL/hr until 0500. K replaced per protocol. SubQ heparin for VTE proph. Cardiac diet with accuchecks QID. Insulin given per MAR. U/A collected. Pt up with 1 assist and walker.   Cardiology following case.     Problem: CARDIOVASCULAR - ADULT  Goal: Maintains optimal cardiac output and hemodynamic stability  Description: INTERVENTIONS:  - Monitor vital signs, rhythm, and trends  - Monitor for bleeding, hypotension and signs of decreased cardiac output  - Evaluate effectiveness of vasoactive medications to optimize hemodynamic stability  - Monitor arterial and/or venous puncture sites for bleeding and/or hematoma  - Assess quality of pulses, skin color and temperature  - Assess for signs of decreased coronary artery perfusion - ex. Angina  - Evaluate fluid balance, assess for edema, trend weights  Outcome: Progressing     Problem: RESPIRATORY - ADULT  Goal: Achieves optimal ventilation and oxygenation  Description: INTERVENTIONS:  - Assess for changes in respiratory status  - Assess for changes in mentation and behavior  - Position to facilitate oxygenation and minimize respiratory effort  - Oxygen supplementation based on oxygen saturation or ABGs  - Provide Smoking Cessation handout, if applicable  - Encourage broncho-pulmonary hygiene including cough, deep breathe, Incentive Spirometry  - Assess the need for suctioning and perform as needed  - Assess and instruct to report SOB or any respiratory difficulty  - Respiratory Therapy support as indicated  - Manage/alleviate anxiety  - Monitor for signs/symptoms of CO2 retention  Outcome: Progressing     Problem: METABOLIC/FLUID AND ELECTROLYTES - ADULT  Goal: Glucose maintained within prescribed range  Description: INTERVENTIONS:  - Monitor Blood  Glucose as ordered  - Assess for signs and symptoms of hyperglycemia and hypoglycemia  - Administer ordered medications to maintain glucose within target range  - Assess barriers to adequate nutritional intake and initiate nutrition consult as needed  - Instruct patient on self management of diabetes  Outcome: Progressing  Goal: Electrolytes maintained within normal limits  Description: INTERVENTIONS:  - Monitor labs and rhythm and assess patient for signs and symptoms of electrolyte imbalances  - Administer electrolyte replacement as ordered  - Monitor response to electrolyte replacements, including rhythm and repeat lab results as appropriate  - Fluid restriction as ordered  - Instruct patient on fluid and nutrition restrictions as appropriate  Outcome: Progressing

## 2024-06-16 NOTE — PROGRESS NOTES
NURSING DISCHARGE NOTE    Discharged Home via Wheelchair.  Accompanied by Family member  Belongings Taken by patient/family.    Discharged instructions discussed with patient/family.  Instructed to continue to his home medications as prescribed.  Instructed to follow up with his cardiologist and primary MD.  IV/telemetry removed.  Family verbalizes good understanding of the instructions.

## 2024-06-16 NOTE — PLAN OF CARE
Assumed patient care @ 07:30.  Alert and oriented x 4.  Room air.  SB on tele.  Denies pain.  Cardiac diet.  QID accucheck.  Safety fall precautions maintained.  Needs attended, call light within his reach.  Bed in lowest position, bed alarm is on.  Family at bedside.  Problem: CARDIOVASCULAR - ADULT  Goal: Maintains optimal cardiac output and hemodynamic stability  Description: INTERVENTIONS:  - Monitor vital signs, rhythm, and trends  - Monitor for bleeding, hypotension and signs of decreased cardiac output  - Evaluate effectiveness of vasoactive medications to optimize hemodynamic stability  - Monitor arterial and/or venous puncture sites for bleeding and/or hematoma  - Assess quality of pulses, skin color and temperature  - Assess for signs of decreased coronary artery perfusion - ex. Angina  - Evaluate fluid balance, assess for edema, trend weights  Outcome: Progressing

## 2024-06-21 NOTE — DISCHARGE SUMMARY
General Medicine Discharge Summary     Patient ID:  Bonifacio Mccoy  83 year old  9/30/1940    Admit date: 6/15/2024    Discharge date and time: 6/16/2024     Attending Physician: No att. providers found     Primary Care Physician: Hiram Nugent MD     Discharge Diagnoses: Syncope and collapse [R55]  Hyponatremia [E87.1]  CKD (chronic kidney disease), stage IV (HCC) [N18.4]    Primary Diagnosis at discharge from Hospital: Other: syncope; no TCM follow-up needed    Please note that only IHP DMG and EMG patients enrolled in the Medicare ACO, BCBS ACO and [facility] HMOs will be handled by the [facility] Care Management team.  For all other patients, please follow usual protocol for discharge care transition.    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    Discharge Condition: stable    Disposition: home      Important Follow up:  - PCP within   - Consults:     Hiram Nugent MD  [address]  [facility] [address]  [place] [address] [phone]    Schedule an appointment as soon as possible for a visit in 1 week(s)      Cornell Bryson MD  [address]   [facility] [address]  [place] [address] [phone]    Schedule an appointment as soon as possible for a visit in 1 week(s)  With Dr. Sarabia or one of the Cardiology APNs    - Labs: n/a  - Radiology: n/a    Hospital Course:      For further details, please see daily progress notes. 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-improved  -Creatinine 3.0, baseline 2.2-2.6  - Likely secondary to overdiuresis, per above  - Renally dose meds, avoid nephrotoxic agents  - Holding ARB  - Creatinine improved with gentle IV fluids, encourage hydration while inpt   -- Ok to resume ARB & home torsemide on discharge     CAD  Chronic diastolic CHF  Bradycardia  HTN/HL  -Appears dry on exam  -Holding ARB, diuretics per above  - Strict I/O's, daily weights  - Resume aspirin, statin, Plavix  -Home Coreg held due to bradycardia, discussed with cardiology, hold coreg for now, to follow-up with cardiologist outpatient     DM2  -Resume home long-acting insulin 8 units nightly  - Holding oral antihyperglycemics while admitted  - Accu-Cheks, SSI-low        Consults: IP CONSULT TO CARDIOLOGY  IP CONSULT TO SOCIAL WORK    Operative Procedures:        Patient instructions:      All medications personally reviewed and reconciled on day of discharge.     Discharge Medication List as of 6/16/2024  2:13 PM        CONTINUE these medications which have NOT CHANGED    Details   !! tamsulosin 0.4 MG Oral Cap Take 1 capsule (0.4 mg total) by mouth As Directed., Historical      torsemide 20 MG Oral Tab Take 1 tablet (20 mg total) by mouth daily., Historical      ezetimibe 10 MG Oral Tab Take 1 tablet (10 mg total) by mouth nightly., Historical      midodrine 5 MG Oral Tab Take 1 tablet (5 mg total) by mouth in the  morning and 1 tablet (5 mg total) at noon and 1 tablet (5 mg total) in the evening., Historical      famotidine 20 MG Oral Tab Take 1 tablet (20 mg total) by mouth nightly as needed for Heartburn., Historical      aspirin 81 MG Oral Chew Tab Chew 1 tablet (81 mg total) by mouth daily., Print Script, Disp-30 tablet, R-0      insulin glargine 100 UNIT/ML Subcutaneous Solution Inject 8 Units into the skin nightly., Historical      atorvastatin 80 MG Oral Tab Take 1 tablet (80 mg total) by mouth nightly., Historical      allopurinol 100 MG Oral Tab Take 1 tablet (100 mg total) by mouth as needed (1 tablet daily)., Historical      clopidogrel 75 MG Oral Tab Take 1 tablet (75 mg total) by mouth daily., Historical      calcitriol 0.25 MCG Oral Cap Take 1 capsule (0.25 mcg total) by mouth every other day., Historical      Vitamin D3, Cholecalciferol, 25 MCG (1000 UT) Oral Tab Take 1 tablet (1,000 Units total) by mouth daily., Historical      !! tamsulosin HCl 0.4 MG Oral Cap Take 1 capsule (0.4 mg total) by mouth daily., Historical       !! - Potential duplicate medications found. Please discuss with provider.        STOP taking these medications       Omeprazole 40 MG Oral Capsule Delayed Release              Activity: activity as tolerated  Diet: cardiac diet  Wound Care: as directed  Code Status: Full Code      Exam on day of discharge:     Vitals:    06/16/24 1215   BP: 122/51   Pulse: 54   Resp: 16   Temp: 97.6 °F (36.4 °C)       General: nad, comfortable, nontoxic   Heart: RRR, no murmurs appreciated   Lungs: clear bilaterally, reg resp rate & effort, no wheezes/crackles   Abdomen: soft, ntnd, no guarding/rebound   Extremities: WWP, no XAVIER   Neuro: CN inact, no focal deficits      Total time coordinating care for discharge: 35 minutes    DEONNA Sandoval MD  [facility] Hospitalist  [phone]

## 2024-07-15 NOTE — H&P
Darryl Ortiz MD   Physician  Cardiology     Consults     Signed     Date of Service: 2024  9:23 AM  Consult Orders   Consult to Cardiology [[id number]] ordered by Abbe Bello MD at 06/15/24 1311          Signed       Expand All Collapse All    [facility] Cardiology  Report of Consultation           Bonifacio Mccoy Patient Status:  Observation    1940 MRN [id number]   [facility] 3NE-A Attending Pete Sandoval MD   Hosp Day # 0 PCP Hiram Nugent MD      Reason for Consultation:   Near syncope.     History of Present Illness:   Bonifacio Mccoy is a(n) 83 year old male with a histroy of CKD 4, CAD (s/p PCI by Johnson), HTN, dyslipidemia who had a near syncopal event (actually 2 such events).  The history was provided by the patient and his daughter who provided some translation although the patient appears to understand english well.     He was awake late at night.  Evidently he sleeps very little and is usually awake all night and on the phone with friends in [place].  He may have taken a second dose of his diuretic.  He then went to the bath room and was urinating and became dizzy and fell down.  He was able to use his walker to get up and went to the bed and a relative was there with him.  He ate 1/2 a slice of delbert and vomited it up.  He became very dizzy and then fell back in bed.  He was not unconscious, however was \"out of it\" for about 20s.  The dizziness then passed, EMS was called, and he was taken to the ER.     He notes no preceding symptoms.  Specifically no SOB, CP, palpitations.     He denies significant palpitations at baseline.  He has not had a history of falls.     The family is pretty sure he took an extra dose of diuretic.  Evidently the patient follows his own urine output an takes an extra dose of diuretic when it drops. In this case, he likely was dehydrated to start and took an extra dose of lasix, to which the patient appears to agree.     He  is currently doing well.  He has no CP, SOLANO, or dizziness.     At baseline he can walk from parking lot to store or MD office without difficulty or limitation.  No CP/angina (and since recent PCI) no SOLANO.     Past Medical History:   Past Medical History       Past Medical History:    Arthritis    Congestive heart disease (HCC)    Diabetes (HCC)    Essential hypertension    High blood pressure    High cholesterol    History of blood clots    Muscle weakness    Renal disorder    Sleep apnea     non compliant with CPAP            Social History:   Smoking:  None  Alcohol:  None     Family History:   No family history of premature arthrosclerotic heart disease      Medications:   Scheduled:   Scheduled Medications    allopurinol  300 mg Oral Daily    aspirin  81 mg Oral Daily    atorvastatin  80 mg Oral Nightly    calcitriol  0.25 mcg Oral Daily    clopidogrel  75 mg Oral Daily    ezetimibe  10 mg Oral Nightly    insulin degludec  8 Units Subcutaneous Nightly    pantoprazole  40 mg Oral QAM AC    tamsulosin  0.4 mg Oral Daily @ 0700    heparin  5,000 Units Subcutaneous Q8H JEFERSON    potassium chloride  20 mEq Intravenous Once    insulin aspart  1-5 Units Subcutaneous TID AC and HS            Continuous Infusion:   Medication Infusions            PRN Medications:     PRN Medications     ondansetron    metoclopramide    acetaminophen        Outpatient Medications:   Medications Ordered Prior to Encounter             Current Facility-Administered Medications on File Prior to Encounter   Medication Dose Route Frequency Provider Last Rate Last Admin    [] sodium chloride 0.9% infusion   Intravenous Continuous Michael Peterson MD 83 mL/hr at 24 1103 New Bag at 24 1103    [COMPLETED] lidocaine PF (Xylocaine-MPF) 1 % injection                [COMPLETED] heparin (Porcine) 5000 UNIT/ML injection                [COMPLETED] heparin (Porcine) 5000 UNIT/ML injection                [COMPLETED] fentaNYL (Sublimaze) 50  mcg/mL injection                [COMPLETED] midazolam (Versed) 2 MG/2ML injection                [COMPLETED] verapamil (Isoptin) 2.5 mg/mL injection                [COMPLETED] Nitroglycerin in D5W 200-5 MCG/ML-% injection                [COMPLETED] epoetin shannan (Epogen, Procrit) 75045 UNIT/ML injection 20,000 Units  20,000 Units Subcutaneous Once Michael Peterson MD   20,000 Units at 24 0935    [COMPLETED] iodixanol (VISIPAQUE) 320 MG/ML injection 100 mL  100 mL Injection ONCE PRN Michael Ornelas MD   50 mL at 24 1555    [COMPLETED] lidocaine PF (Xylocaine-MPF) 1 % injection                [COMPLETED] heparin (Porcine) 5000 UNIT/ML injection                [] ondansetron (Zofran) 4 MG/2ML injection 4 mg  4 mg Intravenous Q4H PRN Lalo Neal MD        [] sodium chloride 0.9% infusion   Intravenous On Call Tuyet White APRN        [COMPLETED] aspirin DR tab 325 mg  325 mg Oral Once Tuyet White APRN   325 mg at 24 1303    [] sodium chloride 0.9% infusion   Intravenous Continuous Michael Peterson MD   Stopped at 24 1800    [COMPLETED] aspirin chewable tab 81 mg  81 mg Oral Once Lalo Neal MD   81 mg at 24 192    [COMPLETED] famotidine (Pepcid) 20 mg/2mL injection 20 mg  20 mg Intravenous Once Lalo Neal MD   20 mg at 24             Current Outpatient Medications on File Prior to Encounter   Medication Sig Dispense Refill    torsemide 20 MG Oral Tab Take 1 tablet (20 mg total) by mouth daily.        ezetimibe 10 MG Oral Tab Take 1 tablet (10 mg total) by mouth nightly.        midodrine 5 MG Oral Tab Take 1 tablet (5 mg total) by mouth in the morning and 1 tablet (5 mg total) at noon and 1 tablet (5 mg total) in the evening.        famotidine 20 MG Oral Tab Take 1 tablet (20 mg total) by mouth nightly as needed for Heartburn.        aspirin 81 MG Oral Chew Tab Chew 1 tablet (81 mg total) by mouth daily. 30 tablet 0    insulin glargine  100 UNIT/ML Subcutaneous Solution Inject 8 Units into the skin nightly.        atorvastatin 80 MG Oral Tab Take 1 tablet (80 mg total) by mouth nightly.        clopidogrel 75 MG Oral Tab Take 1 tablet (75 mg total) by mouth daily.        calcitriol 0.25 MCG Oral Cap Take 1 capsule (0.25 mcg total) by mouth every other day.        Vitamin D3, Cholecalciferol, 25 MCG (1000 UT) Oral Tab Take 1 tablet (1,000 Units total) by mouth daily.        tamsulosin HCl 0.4 MG Oral Cap Take 1 capsule (0.4 mg total) by mouth daily.        tamsulosin 0.4 MG Oral Cap Take 1 capsule (0.4 mg total) by mouth As Directed.        allopurinol 100 MG Oral Tab Take 1 tablet (100 mg total) by mouth as needed (1 tablet daily).                Allergies:   Allergies        Allergies   Allergen Reactions    Losartan UNKNOWN    Lisinopril ITCHING            Review of Systems:   No fevers, chills, change in weight or bowel habits.  Ten point review of systems is otherwise negative or unremarkable.     Physical Exam:       Vitals:     06/16/24 0856   BP: 112/52   Pulse: 62   Resp:     Temp:            Wt Readings from Last 3 Encounters:   06/15/24 171 lb 15.3 oz (78 kg)   04/26/24 171 lb 15.3 oz (78 kg)   04/02/24 180 lb (81.6 kg)              General: Well developed, well nourished male.  Pt is in no acute distress.  HEENT:   Normocephalic.  Atraumatic.  Eyes with no scleral icterus.  Neck: Supple.  No JVD.  Carotids 2+ and equal in symmetric fashion.  No bruits are noted.  Cardiac: Regular rate and rhythm.   There is a normal S1 and S2.  No S3 or S4.  No murmurs, rubs, or gallops.  PMI is non-displaced with a normal apical impulse.  Lungs: Clear to ascultation bilaterally.  No focal rales, rhonchi, or wheezes.  Good air movement is noted throughout all lung fields.  Abdomen: Soft.  Non-distended.  Non-tender.  Bowel sounds are present and normoactive.  No guarding or rebound.   Extremities: Extremities do not demonstrate any evidence of peripheral  edema.   No cyanosis or clubbing of the digits is appreciated.  Femoral, Dorsalis Pedis, and Posterior Tibialis  pulses are 2+ and equal in a symmetric fashion.  Neurologic: Alert and oriented, normal affect.  No gross deficit appreciated.  Integument:  No visible rashes are appreciated.        Laboratories and Data:   Labs:         Recent Labs   Lab 06/15/24  1233 06/16/24  0705   * 102*   BUN 47* 45*   CREATSERUM 3.00* 2.76*   CA 8.9 8.2*   ALB 3.4  --    * 136   K 3.8 4.6  4.6   CL 99 107   CO2 22.0 22.0   ALKPHO 99  --    AST 41*  --    ALT 41  --    BILT 0.9  --    TP 7.2  --               Recent Labs   Lab 06/15/24  1233 06/16/24  0705   RBC 3.22* 2.81*   HGB 10.2* 9.1*   HCT 28.8* 25.5*   MCV 89.4 90.7   MCH 31.7 32.4   MCHC 35.4 35.7   RDW 13.5 13.7   NEPRELIM 4.64 3.94   WBC 8.8 7.8   .0 176.0         No results for input(s): \"PTP\", \"INR\" in the last 168 hours.     No results for input(s): \"TROP\", \"CK\" in the last 168 hours.     Diagnostics:   Tele: SB, rate 48.  EKG: SB, rate 53.  No sig abnormalities.  Echo:   2021  1. Left ventricle: The cavity size was normal. Wall thickness was normal.      Systolic function was normal. The estimated ejection fraction was 65-70%.   2. Mitral valve: Mildly calcified annulus.   3. Left atrium: The left atrium was mildly enlarged.   Cath:   4/2024  Findings:  1. Left main: No stenosis  2. LAD: Proximal 75% diffuse moderately calcified plaque, short segment mid intramyocardial with mild systolic compression, several small diagonal branches  3. Left circumflex: Gives off high OM1 with 75% proximal plaquing, then  prox LCX.  4. RCA: Proximal to mid diffuse severe disease, worst > 90%. PDA and RPL luminal irregularities. Distal RPL supplies grade 3 collaterals to LCX backfilling to proximal OM.   5. Hemodynamics: LVEDP 10 mmHg. No LV-Ao gradient on pullback.   Conclusions / Recommendations:  1. Severe multivessel CAD, some progression since last  angiogram from March 2023. We treated the more critical stenosis in the RCA today, which also supplies collaterals to the occluded LCX. The LAD has stably severe disease.    2. S/p IVUS guided PCI of prox-mid RCA with LEXIE x 1 (3.0 x 38 mm Synergy), post dilated 3.5 NC high pressure.   3. We used only 15 ml contrast, relying mostly on landmark and IVUS rather than angiography. I think his renal function should stay stable. We gave another 800 ml NS during the procedure as his LVEDP was normal ~ 10 mmHg.   4. Consider bringing him back after a month to treat the LAD. The OM1 is more of a medium sized branch and could be left for medical therapy if he is not suffering debilitating angina. .   5. Remove the TR band per protocol  6. Continue DAPT, statin, medical therapy        Assessment:  1. Pre-syncope  2. Dizziness  3. Insomnia  4. CKD4  5. CAD, s/p PCI with severe disease  6. Bradycardia (50s usually)  7. Anemia        Plan:  1. Pre-syncope/dizziness: Likely due to dehydration. BP has improved with IVF.  No urine sodium or BNP drawn but seems likely.  Will check echo.  Will ambulate and check orthostatics.  He appears to be on midodrine 5mg TID PRN.  Will check orthostatics and ambulate.  If echo ok and BP improved with good ambulation and no dizziness, can consider DC this afternoon..    2. CAD: FU for PCI with Dr. Ornelas.  DAPT.  No signs/symptoms c/w ACS.  Echo pending.  3. CKD: Follows with nephrology.  4. Bradycardia: HR high 40s-50s.  Usuallly this is not sufficient to cause dizziness or syncope.  Will check echo.  Is not on BB as far as I can tell (they are noted in prior clinic notes but not currently on list).  Would avoid rate controlling agents of BB for now.  If he somehow is on one, cut dose in 1/2.     [facility] MD Darryl Ortiz MD  6/16/2024  9:24 AM               Electronically signed by Darryl Ortiz MD at 6/16/2024  9:52 AM         ED to Hosp-Admission (Discharged) on 6/15/2024            Detailed  Report          Note shared with patient  Chart Review: Note Routing History    No routing

## 2024-07-15 NOTE — H&P
Darryl Ortiz MD   Physician  Cardiology     Consults     Signed     Date of Service: 2024  9:23 AM  Consult Orders   Consult to Cardiology [[id number]] ordered by Abbe Bello MD at 06/15/24 1311          Signed       Expand All Collapse All    [facility] Cardiology  Report of Consultation           Bonifacio Mccoy Patient Status:  Observation    1940 MRN [id number]   [facility] 3NE-A Attending Pete Sandoval MD   Hosp Day # 0 PCP Hiram Nugent MD      Reason for Consultation:   Near syncope.     History of Present Illness:   Bonifacio Mccoy is a(n) 83 year old male with a histroy of CKD 4, CAD (s/p PCI by Johnson), HTN, dyslipidemia who had a near syncopal event (actually 2 such events).  The history was provided by the patient and his daughter who provided some translation although the patient appears to understand english well.     He was awake late at night.  Evidently he sleeps very little and is usually awake all night and on the phone with friends in [place].  He may have taken a second dose of his diuretic.  He then went to the bath room and was urinating and became dizzy and fell down.  He was able to use his walker to get up and went to the bed and a relative was there with him.  He ate 1/2 a slice of delbert and vomited it up.  He became very dizzy and then fell back in bed.  He was not unconscious, however was \"out of it\" for about 20s.  The dizziness then passed, EMS was called, and he was taken to the ER.     He notes no preceding symptoms.  Specifically no SOB, CP, palpitations.     He denies significant palpitations at baseline.  He has not had a history of falls.     The family is pretty sure he took an extra dose of diuretic.  Evidently the patient follows his own urine output an takes an extra dose of diuretic when it drops. In this case, he likely was dehydrated to start and took an extra dose of lasix, to which the patient appears to agree.      He is currently doing well.  He has no CP, SOLANO, or dizziness.     At baseline he can walk from parking lot to store or MD office without difficulty or limitation.  No CP/angina (and since recent PCI) no SOLANO.     Past Medical History:   Past Medical History       Past Medical History:    Arthritis    Congestive heart disease (HCC)    Diabetes (HCC)    Essential hypertension    High blood pressure    High cholesterol    History of blood clots    Muscle weakness    Renal disorder    Sleep apnea     non compliant with CPAP            Social History:   Smoking:  None  Alcohol:  None     Family History:   No family history of premature arthrosclerotic heart disease      Medications:   Scheduled:   Scheduled Medications    allopurinol  300 mg Oral Daily    aspirin  81 mg Oral Daily    atorvastatin  80 mg Oral Nightly    calcitriol  0.25 mcg Oral Daily    clopidogrel  75 mg Oral Daily    ezetimibe  10 mg Oral Nightly    insulin degludec  8 Units Subcutaneous Nightly    pantoprazole  40 mg Oral QAM AC    tamsulosin  0.4 mg Oral Daily @ 0700    heparin  5,000 Units Subcutaneous Q8H JEFERSON    potassium chloride  20 mEq Intravenous Once    insulin aspart  1-5 Units Subcutaneous TID AC and HS            Continuous Infusion:   Medication Infusions            PRN Medications:     PRN Medications     ondansetron    metoclopramide    acetaminophen        Outpatient Medications:   Medications Ordered Prior to Encounter             Current Facility-Administered Medications on File Prior to Encounter   Medication Dose Route Frequency Provider Last Rate Last Admin    [] sodium chloride 0.9% infusion   Intravenous Continuous Michael Peterson MD 83 mL/hr at 24 1103 New Bag at 24 1103    [COMPLETED] lidocaine PF (Xylocaine-MPF) 1 % injection                [COMPLETED] heparin (Porcine) 5000 UNIT/ML injection                [COMPLETED] heparin (Porcine) 5000 UNIT/ML injection                [COMPLETED] fentaNYL  (Sublimaze) 50 mcg/mL injection                [COMPLETED] midazolam (Versed) 2 MG/2ML injection                [COMPLETED] verapamil (Isoptin) 2.5 mg/mL injection                [COMPLETED] Nitroglycerin in D5W 200-5 MCG/ML-% injection                [COMPLETED] epoetin shannan (Epogen, Procrit) 03975 UNIT/ML injection 20,000 Units  20,000 Units Subcutaneous Once Michael Peterson MD   20,000 Units at 24 0935    [COMPLETED] iodixanol (VISIPAQUE) 320 MG/ML injection 100 mL  100 mL Injection ONCE PRN Michael Ornelas MD   50 mL at 24 1555    [COMPLETED] lidocaine PF (Xylocaine-MPF) 1 % injection                [COMPLETED] heparin (Porcine) 5000 UNIT/ML injection                [] ondansetron (Zofran) 4 MG/2ML injection 4 mg  4 mg Intravenous Q4H PRN Lalo Neal MD        [] sodium chloride 0.9% infusion   Intravenous On Call Tuyet White APRN        [COMPLETED] aspirin DR tab 325 mg  325 mg Oral Once Tuyet White APRN   325 mg at 24 1303    [] sodium chloride 0.9% infusion   Intravenous Continuous Michael Peterson MD   Stopped at 24 1800    [COMPLETED] aspirin chewable tab 81 mg  81 mg Oral Once Lalo Neal MD   81 mg at 24 192    [COMPLETED] famotidine (Pepcid) 20 mg/2mL injection 20 mg  20 mg Intravenous Once Lalo Neal MD   20 mg at 24             Current Outpatient Medications on File Prior to Encounter   Medication Sig Dispense Refill    torsemide 20 MG Oral Tab Take 1 tablet (20 mg total) by mouth daily.        ezetimibe 10 MG Oral Tab Take 1 tablet (10 mg total) by mouth nightly.        midodrine 5 MG Oral Tab Take 1 tablet (5 mg total) by mouth in the morning and 1 tablet (5 mg total) at noon and 1 tablet (5 mg total) in the evening.        famotidine 20 MG Oral Tab Take 1 tablet (20 mg total) by mouth nightly as needed for Heartburn.        aspirin 81 MG Oral Chew Tab Chew 1 tablet (81 mg total) by mouth daily. 30 tablet 0     insulin glargine 100 UNIT/ML Subcutaneous Solution Inject 8 Units into the skin nightly.        atorvastatin 80 MG Oral Tab Take 1 tablet (80 mg total) by mouth nightly.        clopidogrel 75 MG Oral Tab Take 1 tablet (75 mg total) by mouth daily.        calcitriol 0.25 MCG Oral Cap Take 1 capsule (0.25 mcg total) by mouth every other day.        Vitamin D3, Cholecalciferol, 25 MCG (1000 UT) Oral Tab Take 1 tablet (1,000 Units total) by mouth daily.        tamsulosin HCl 0.4 MG Oral Cap Take 1 capsule (0.4 mg total) by mouth daily.        tamsulosin 0.4 MG Oral Cap Take 1 capsule (0.4 mg total) by mouth As Directed.        allopurinol 100 MG Oral Tab Take 1 tablet (100 mg total) by mouth as needed (1 tablet daily).                Allergies:   Allergies        Allergies   Allergen Reactions    Losartan UNKNOWN    Lisinopril ITCHING            Review of Systems:   No fevers, chills, change in weight or bowel habits.  Ten point review of systems is otherwise negative or unremarkable.     Physical Exam:       Vitals:     06/16/24 0856   BP: 112/52   Pulse: 62   Resp:     Temp:            Wt Readings from Last 3 Encounters:   06/15/24 171 lb 15.3 oz (78 kg)   04/26/24 171 lb 15.3 oz (78 kg)   04/02/24 180 lb (81.6 kg)              General: Well developed, well nourished male.  Pt is in no acute distress.  HEENT:   Normocephalic.  Atraumatic.  Eyes with no scleral icterus.  Neck: Supple.  No JVD.  Carotids 2+ and equal in symmetric fashion.  No bruits are noted.  Cardiac: Regular rate and rhythm.   There is a normal S1 and S2.  No S3 or S4.  No murmurs, rubs, or gallops.  PMI is non-displaced with a normal apical impulse.  Lungs: Clear to ascultation bilaterally.  No focal rales, rhonchi, or wheezes.  Good air movement is noted throughout all lung fields.  Abdomen: Soft.  Non-distended.  Non-tender.  Bowel sounds are present and normoactive.  No guarding or rebound.   Extremities: Extremities do not demonstrate any  evidence of peripheral edema.   No cyanosis or clubbing of the digits is appreciated.  Femoral, Dorsalis Pedis, and Posterior Tibialis  pulses are 2+ and equal in a symmetric fashion.  Neurologic: Alert and oriented, normal affect.  No gross deficit appreciated.  Integument:  No visible rashes are appreciated.        Laboratories and Data:   Labs:         Recent Labs   Lab 06/15/24  1233 06/16/24  0705   * 102*   BUN 47* 45*   CREATSERUM 3.00* 2.76*   CA 8.9 8.2*   ALB 3.4  --    * 136   K 3.8 4.6  4.6   CL 99 107   CO2 22.0 22.0   ALKPHO 99  --    AST 41*  --    ALT 41  --    BILT 0.9  --    TP 7.2  --               Recent Labs   Lab 06/15/24  1233 06/16/24  0705   RBC 3.22* 2.81*   HGB 10.2* 9.1*   HCT 28.8* 25.5*   MCV 89.4 90.7   MCH 31.7 32.4   MCHC 35.4 35.7   RDW 13.5 13.7   NEPRELIM 4.64 3.94   WBC 8.8 7.8   .0 176.0         No results for input(s): \"PTP\", \"INR\" in the last 168 hours.     No results for input(s): \"TROP\", \"CK\" in the last 168 hours.     Diagnostics:   Tele: SB, rate 48.  EKG: SB, rate 53.  No sig abnormalities.  Echo:   2021  1. Left ventricle: The cavity size was normal. Wall thickness was normal.      Systolic function was normal. The estimated ejection fraction was 65-70%.   2. Mitral valve: Mildly calcified annulus.   3. Left atrium: The left atrium was mildly enlarged.   Cath:   4/2024  Findings:  1. Left main: No stenosis  2. LAD: Proximal 75% diffuse moderately calcified plaque, short segment mid intramyocardial with mild systolic compression, several small diagonal branches  3. Left circumflex: Gives off high OM1 with 75% proximal plaquing, then  prox LCX.  4. RCA: Proximal to mid diffuse severe disease, worst > 90%. PDA and RPL luminal irregularities. Distal RPL supplies grade 3 collaterals to LCX backfilling to proximal OM.   5. Hemodynamics: LVEDP 10 mmHg. No LV-Ao gradient on pullback.   Conclusions / Recommendations:  1. Severe multivessel CAD, some  progression since last angiogram from March 2023. We treated the more critical stenosis in the RCA today, which also supplies collaterals to the occluded LCX. The LAD has stably severe disease.    2. S/p IVUS guided PCI of prox-mid RCA with LEXIE x 1 (3.0 x 38 mm Synergy), post dilated 3.5 NC high pressure.   3. We used only 15 ml contrast, relying mostly on landmark and IVUS rather than angiography. I think his renal function should stay stable. We gave another 800 ml NS during the procedure as his LVEDP was normal ~ 10 mmHg.   4. Consider bringing him back after a month to treat the LAD. The OM1 is more of a medium sized branch and could be left for medical therapy if he is not suffering debilitating angina. .   5. Remove the TR band per protocol  6. Continue DAPT, statin, medical therapy        Assessment:  1. Pre-syncope  2. Dizziness  3. Insomnia  4. CKD4  5. CAD, s/p PCI with severe disease  6. Bradycardia (50s usually)  7. Anemia        Plan:  1. Pre-syncope/dizziness: Likely due to dehydration. BP has improved with IVF.  No urine sodium or BNP drawn but seems likely.  Will check echo.  Will ambulate and check orthostatics.  He appears to be on midodrine 5mg TID PRN.  Will check orthostatics and ambulate.  If echo ok and BP improved with good ambulation and no dizziness, can consider DC this afternoon..    2. CAD: FU for PCI with Dr. Ornelas.  DAPT.  No signs/symptoms c/w ACS.  Echo pending.  3. CKD: Follows with nephrology.  4. Bradycardia: HR high 40s-50s.  Usuallly this is not sufficient to cause dizziness or syncope.  Will check echo.  Is not on BB as far as I can tell (they are noted in prior clinic notes but not currently on list).  Would avoid rate controlling agents of BB for now.  If he somehow is on one, cut dose in 1/2.     [facility] MD Darryl Ortiz MD  6/16/2024  9:24 AM               Electronically signed by Darryl Ortiz MD at 6/16/2024  9:52 AM         ED to Hosp-Admission (Discharged) on 6/15/2024             Detailed Report          Note shared with patient  Chart Review: Note Routing History    No routing history on file.

## 2024-07-23 VITALS — WEIGHT: 165 LBS | BODY MASS INDEX: 27.49 KG/M2 | HEIGHT: 65 IN

## 2024-07-23 NOTE — PAT NURSING NOTE
Received call back from Jessi at [facility]; States she had reached out to their NP's re: the patient but will follow up with them again to get an answer for pt/family/hospital as to whether pt being admitted or procedure completed as an outpatient. Jessi will call back with update; thanked her for the assistance.    VM left by Jessi w/ follow up (1126).    Called Jessi at 1149; states pt is to be admitted for pre-hydration ahead of procedure on 7/29. Jessi will send update of outpatient cancellation. Thanked Jessi for the follow up.

## 2024-07-23 NOTE — PAT NURSING NOTE
Spoke with pt's daughter Reji; while going through PAT questions, daughter asked about admission time. RN explained that pt is not admitted, will receive time to come day of procedure. Daughter stated last time pt came in a day in advance for pre-hydration and NP stated that is what would happen this time. RN will call [facility] office to clarify but if daughter does not hear from someone today, encouraged her to reach out to office. Daughter verbalized understanding of conversation.    LVM for [facility] Scheduling, re: pt/prehydration/admission/clarification. Provided PAT # to CB.

## 2024-07-25 ENCOUNTER — HOSPITAL ENCOUNTER (OUTPATIENT)
Dept: INTERVENTIONAL RADIOLOGY/VASCULAR | Facility: HOSPITAL | Age: 84
Discharge: HOME | End: 2024-07-25
Attending: INTERNAL MEDICINE
Payer: MEDICARE

## 2024-07-29 ENCOUNTER — HOSPITAL ENCOUNTER (INPATIENT)
Facility: HOSPITAL | Age: 84
LOS: 2 days | Discharge: HOME HEALTH | End: 2024-07-31
Attending: STUDENT IN AN ORGANIZED HEALTH CARE EDUCATION/TRAINING PROGRAM | Admitting: INTERNAL MEDICINE
Payer: MEDICARE

## 2024-07-29 ENCOUNTER — HOSPITAL ENCOUNTER (OUTPATIENT)
Dept: INTERVENTIONAL RADIOLOGY/VASCULAR | Facility: HOSPITAL | Age: 84
Discharge: HOME | End: 2024-07-29
Payer: MEDICARE

## 2024-07-29 PROBLEM — I25.10 CAD (CORONARY ARTERY DISEASE): Status: ACTIVE | Noted: 2024-07-29

## 2024-07-29 PROBLEM — I10 PRIMARY HYPERTENSION: Status: ACTIVE | Noted: 2024-07-29

## 2024-07-29 LAB
ANION GAP SERPL CALC-SCNC: 5 MMOL/L (ref 0–18)
BASOPHILS # BLD AUTO: 0.04 X10(3) UL (ref 0–0.2)
BASOPHILS # BLD AUTO: 0.05 X10(3) UL (ref 0–0.2)
BASOPHILS NFR BLD AUTO: 0.6 %
BASOPHILS NFR BLD AUTO: 0.7 %
BUN BLD-MCNC: 36 MG/DL (ref 9–23)
CALCIUM BLD-MCNC: 8.9 MG/DL (ref 8.7–10.4)
CHLORIDE SERPL-SCNC: 103 MMOL/L (ref 98–112)
CO2 SERPL-SCNC: 26 MMOL/L (ref 21–32)
CREAT BLD-MCNC: 2.16 MG/DL
EGFRCR SERPLBLD CKD-EPI 2021: 30 ML/MIN/1.73M2 (ref 60–?)
EOSINOPHIL # BLD AUTO: 0.27 X10(3) UL (ref 0–0.7)
EOSINOPHIL # BLD AUTO: 0.31 X10(3) UL (ref 0–0.7)
EOSINOPHIL NFR BLD AUTO: 4.1 %
EOSINOPHIL NFR BLD AUTO: 4.6 %
ERYTHROCYTE [DISTWIDTH] IN BLOOD BY AUTOMATED COUNT: 13.2 %
ERYTHROCYTE [DISTWIDTH] IN BLOOD BY AUTOMATED COUNT: 13.2 %
EST. AVERAGE GLUCOSE BLD GHB EST-MCNC: 143 MG/DL (ref 68–126)
GLUCOSE BLD-MCNC: 108 MG/DL (ref 70–99)
GLUCOSE BLD-MCNC: 133 MG/DL (ref 70–99)
GLUCOSE BLD-MCNC: 195 MG/DL (ref 70–99)
HBA1C MFR BLD: 6.6 % (ref ?–5.7)
HCT VFR BLD AUTO: 24.5 %
HCT VFR BLD AUTO: 25.9 %
HGB BLD-MCNC: 8.9 G/DL
HGB BLD-MCNC: 9 G/DL
IMM GRANULOCYTES # BLD AUTO: 0.02 X10(3) UL (ref 0–1)
IMM GRANULOCYTES # BLD AUTO: 0.02 X10(3) UL (ref 0–1)
IMM GRANULOCYTES NFR BLD: 0.3 %
IMM GRANULOCYTES NFR BLD: 0.3 %
LYMPHOCYTES # BLD AUTO: 1.98 X10(3) UL (ref 1–4)
LYMPHOCYTES # BLD AUTO: 2.21 X10(3) UL (ref 1–4)
LYMPHOCYTES NFR BLD AUTO: 30.2 %
LYMPHOCYTES NFR BLD AUTO: 33 %
MCH RBC QN AUTO: 31.9 PG (ref 26–34)
MCH RBC QN AUTO: 32.7 PG (ref 26–34)
MCHC RBC AUTO-ENTMCNC: 34.7 G/DL (ref 31–37)
MCHC RBC AUTO-ENTMCNC: 36.3 G/DL (ref 31–37)
MCV RBC AUTO: 90.1 FL
MCV RBC AUTO: 91.8 FL
MONOCYTES # BLD AUTO: 0.83 X10(3) UL (ref 0.1–1)
MONOCYTES # BLD AUTO: 0.93 X10(3) UL (ref 0.1–1)
MONOCYTES NFR BLD AUTO: 12.7 %
MONOCYTES NFR BLD AUTO: 13.9 %
NEUTROPHILS # BLD AUTO: 3.18 X10 (3) UL (ref 1.5–7.7)
NEUTROPHILS # BLD AUTO: 3.18 X10(3) UL (ref 1.5–7.7)
NEUTROPHILS # BLD AUTO: 3.42 X10 (3) UL (ref 1.5–7.7)
NEUTROPHILS # BLD AUTO: 3.42 X10(3) UL (ref 1.5–7.7)
NEUTROPHILS NFR BLD AUTO: 47.5 %
NEUTROPHILS NFR BLD AUTO: 52.1 %
OSMOLALITY SERPL CALC.SUM OF ELEC: 287 MOSM/KG (ref 275–295)
PLATELET # BLD AUTO: 135 10(3)UL (ref 150–450)
PLATELET # BLD AUTO: 167 10(3)UL (ref 150–450)
PLATELETS.RETICULATED NFR BLD AUTO: 1.9 % (ref 0–7)
POTASSIUM SERPL-SCNC: 3.8 MMOL/L (ref 3.5–5.1)
RBC # BLD AUTO: 2.72 X10(6)UL
RBC # BLD AUTO: 2.82 X10(6)UL
SODIUM SERPL-SCNC: 134 MMOL/L (ref 136–145)
WBC # BLD AUTO: 6.6 X10(3) UL (ref 4–11)
WBC # BLD AUTO: 6.7 X10(3) UL (ref 4–11)

## 2024-07-29 PROCEDURE — 99222 1ST HOSP IP/OBS MODERATE 55: CPT | Performed by: INTERNAL MEDICINE

## 2024-07-29 RX ORDER — METOCLOPRAMIDE HYDROCHLORIDE 5 MG/ML
10 INJECTION INTRAMUSCULAR; INTRAVENOUS EVERY 8 HOURS PRN
Status: DISCONTINUED | OUTPATIENT
Start: 2024-07-29 | End: 2024-07-29

## 2024-07-29 RX ORDER — NICOTINE POLACRILEX 4 MG
15 LOZENGE BUCCAL
Status: DISCONTINUED | OUTPATIENT
Start: 2024-07-29 | End: 2024-07-31

## 2024-07-29 RX ORDER — TORSEMIDE 20 MG/1
20 TABLET ORAL DAILY
Status: DISCONTINUED | OUTPATIENT
Start: 2024-07-30 | End: 2024-07-31

## 2024-07-29 RX ORDER — SENNOSIDES 8.6 MG
17.2 TABLET ORAL NIGHTLY PRN
Status: DISCONTINUED | OUTPATIENT
Start: 2024-07-29 | End: 2024-07-31

## 2024-07-29 RX ORDER — CALCITRIOL 0.25 UG/1
0.25 CAPSULE, LIQUID FILLED ORAL EVERY OTHER DAY
Status: DISCONTINUED | OUTPATIENT
Start: 2024-07-31 | End: 2024-07-31

## 2024-07-29 RX ORDER — HYDROCODONE BITARTRATE AND ACETAMINOPHEN 5; 325 MG/1; MG/1
1 TABLET ORAL EVERY 4 HOURS PRN
Status: DISCONTINUED | OUTPATIENT
Start: 2024-07-29 | End: 2024-07-31

## 2024-07-29 RX ORDER — DEXTROSE MONOHYDRATE 25 G/50ML
50 INJECTION, SOLUTION INTRAVENOUS
Status: DISCONTINUED | OUTPATIENT
Start: 2024-07-29 | End: 2024-07-31

## 2024-07-29 RX ORDER — BISACODYL 10 MG
10 SUPPOSITORY, RECTAL RECTAL
Status: DISCONTINUED | OUTPATIENT
Start: 2024-07-29 | End: 2024-07-31

## 2024-07-29 RX ORDER — CLOPIDOGREL BISULFATE 75 MG/1
75 TABLET ORAL DAILY
Status: DISCONTINUED | OUTPATIENT
Start: 2024-07-30 | End: 2024-07-29

## 2024-07-29 RX ORDER — MORPHINE SULFATE 2 MG/ML
2 INJECTION, SOLUTION INTRAMUSCULAR; INTRAVENOUS EVERY 2 HOUR PRN
Status: DISCONTINUED | OUTPATIENT
Start: 2024-07-29 | End: 2024-07-31

## 2024-07-29 RX ORDER — SODIUM CHLORIDE 9 MG/ML
INJECTION, SOLUTION INTRAVENOUS CONTINUOUS
Status: DISCONTINUED | OUTPATIENT
Start: 2024-07-29 | End: 2024-07-31

## 2024-07-29 RX ORDER — ONDANSETRON 2 MG/ML
4 INJECTION INTRAMUSCULAR; INTRAVENOUS EVERY 6 HOURS PRN
Status: DISCONTINUED | OUTPATIENT
Start: 2024-07-29 | End: 2024-07-31

## 2024-07-29 RX ORDER — HEPARIN SODIUM 5000 [USP'U]/ML
5000 INJECTION, SOLUTION INTRAVENOUS; SUBCUTANEOUS EVERY 8 HOURS SCHEDULED
Status: DISCONTINUED | OUTPATIENT
Start: 2024-07-29 | End: 2024-07-31

## 2024-07-29 RX ORDER — CHOLECALCIFEROL (VITAMIN D3) 25 MCG
1000 TABLET ORAL DAILY
Status: DISCONTINUED | OUTPATIENT
Start: 2024-07-30 | End: 2024-07-31

## 2024-07-29 RX ORDER — MORPHINE SULFATE 2 MG/ML
1 INJECTION, SOLUTION INTRAMUSCULAR; INTRAVENOUS EVERY 2 HOUR PRN
Status: DISCONTINUED | OUTPATIENT
Start: 2024-07-29 | End: 2024-07-31

## 2024-07-29 RX ORDER — ASPIRIN 81 MG/1
81 TABLET, CHEWABLE ORAL DAILY
Status: DISCONTINUED | OUTPATIENT
Start: 2024-07-30 | End: 2024-07-31

## 2024-07-29 RX ORDER — ENEMA 19; 7 G/133ML; G/133ML
1 ENEMA RECTAL ONCE AS NEEDED
Status: DISCONTINUED | OUTPATIENT
Start: 2024-07-29 | End: 2024-07-29

## 2024-07-29 RX ORDER — ACETAMINOPHEN 500 MG
500 TABLET ORAL EVERY 4 HOURS PRN
Status: DISCONTINUED | OUTPATIENT
Start: 2024-07-29 | End: 2024-07-31

## 2024-07-29 RX ORDER — POLYETHYLENE GLYCOL 3350 17 G/17G
17 POWDER, FOR SOLUTION ORAL DAILY PRN
Status: DISCONTINUED | OUTPATIENT
Start: 2024-07-29 | End: 2024-07-31

## 2024-07-29 RX ORDER — HYDROCODONE BITARTRATE AND ACETAMINOPHEN 5; 325 MG/1; MG/1
2 TABLET ORAL EVERY 4 HOURS PRN
Status: DISCONTINUED | OUTPATIENT
Start: 2024-07-29 | End: 2024-07-31

## 2024-07-29 RX ORDER — ACETAMINOPHEN 325 MG/1
650 TABLET ORAL EVERY 4 HOURS PRN
Status: DISCONTINUED | OUTPATIENT
Start: 2024-07-29 | End: 2024-07-31

## 2024-07-29 RX ORDER — ATORVASTATIN CALCIUM 80 MG/1
80 TABLET, FILM COATED ORAL NIGHTLY
Status: DISCONTINUED | OUTPATIENT
Start: 2024-07-29 | End: 2024-07-31

## 2024-07-29 RX ORDER — CLOPIDOGREL BISULFATE 75 MG/1
75 TABLET ORAL DAILY
Status: DISCONTINUED | OUTPATIENT
Start: 2024-07-29 | End: 2024-07-31

## 2024-07-29 RX ORDER — MORPHINE SULFATE 4 MG/ML
4 INJECTION, SOLUTION INTRAMUSCULAR; INTRAVENOUS EVERY 2 HOUR PRN
Status: DISCONTINUED | OUTPATIENT
Start: 2024-07-29 | End: 2024-07-31

## 2024-07-29 RX ORDER — NICOTINE POLACRILEX 4 MG
30 LOZENGE BUCCAL
Status: DISCONTINUED | OUTPATIENT
Start: 2024-07-29 | End: 2024-07-31

## 2024-07-29 RX ORDER — EZETIMIBE 10 MG/1
10 TABLET ORAL NIGHTLY
Status: DISCONTINUED | OUTPATIENT
Start: 2024-07-29 | End: 2024-07-31

## 2024-07-29 RX ORDER — MIDODRINE HYDROCHLORIDE 5 MG/1
5 TABLET ORAL 3 TIMES DAILY
Status: DISCONTINUED | OUTPATIENT
Start: 2024-07-29 | End: 2024-07-31

## 2024-07-29 RX ORDER — TAMSULOSIN HYDROCHLORIDE 0.4 MG/1
0.4 CAPSULE ORAL
Status: DISCONTINUED | OUTPATIENT
Start: 2024-07-30 | End: 2024-07-31

## 2024-07-29 RX ORDER — METOCLOPRAMIDE HYDROCHLORIDE 5 MG/ML
5 INJECTION INTRAMUSCULAR; INTRAVENOUS EVERY 8 HOURS PRN
Status: DISCONTINUED | OUTPATIENT
Start: 2024-07-29 | End: 2024-07-31

## 2024-07-29 NOTE — CONSULTS
[facility]  Report of Consultation    Bonifacio Mccoy Patient Status:  Inpatient    1940 MRN [id number]   Location [facility] 8NE-A Attending Eliza Vivar,    Hosp Day # 0 PCP Hiram Nugent MD     Reason for Consultation:  CKD    History of Present Illness:  Bonifacio Mccoy is a a(n) 83 year old male with hx of CKD 3, CHF, DM, HTN who is admitted for eval of of CAD; plan for C w/ intervention. Paitent admitted for pre-procedural hydration.  Cr @ baseline ~ 2-3; on admit labs Cr 2.16  Denies any cp/sob  No n/v  No f/c  Currently on fluids     History:  Past Medical History:    Arthritis    Congestive heart disease (HCC)    Diabetes (HCC)    Essential hypertension    High blood pressure    High cholesterol    History of blood clots    Muscle weakness    Renal disorder    Sleep apnea    non compliant with CPAP     Past Surgical History:   Procedure Laterality Date    Nephrectomy N/A     Other surgical history N/A     nephrectomy     No family history on file.   reports that he has never smoked. He has never used smokeless tobacco. He reports that he does not drink alcohol and does not use drugs.    Allergies:  Allergies   Allergen Reactions    Losartan UNKNOWN    Lisinopril ITCHING       Current Medications:    Current Facility-Administered Medications:     sodium chloride 0.9% infusion, , Intravenous, Continuous    acetaminophen (Tylenol Extra Strength) tab 500 mg, 500 mg, Oral, Q4H PRN    acetaminophen (Tylenol) tab 650 mg, 650 mg, Oral, Q4H PRN **OR** HYDROcodone-acetaminophen (Norco) 5-325 MG per tab 1 tablet, 1 tablet, Oral, Q4H PRN **OR** HYDROcodone-acetaminophen (Norco) 5-325 MG per tab 2 tablet, 2 tablet, Oral, Q4H PRN    morphINE PF 2 MG/ML injection 1 mg, 1 mg, Intravenous, Q2H PRN **OR** morphINE PF 2 MG/ML injection 2 mg, 2 mg, Intravenous, Q2H PRN **OR** morphINE PF 4 MG/ML injection 4 mg, 4 mg, Intravenous, Q2H PRN    polyethylene glycol (PEG 3350) (Miralax) 17 g oral  packet 17 g, 17 g, Oral, Daily PRN    sennosides (Senokot) tab 17.2 mg, 17.2 mg, Oral, Nightly PRN    bisacodyl (Dulcolax) 10 MG rectal suppository 10 mg, 10 mg, Rectal, Daily PRN    ondansetron (Zofran) 4 MG/2ML injection 4 mg, 4 mg, Intravenous, Q6H PRN    metoclopramide (Reglan) 5 mg/mL injection 10 mg, 10 mg, Intravenous, Q8H PRN    [START ON 7/30/2024] aspirin chewable tab 81 mg, 81 mg, Oral, Daily    atorvastatin (Lipitor) tab 80 mg, 80 mg, Oral, Nightly    [START ON 7/31/2024] calcitriol (Rocaltrol) cap 0.25 mcg, 0.25 mcg, Oral, QOD    ezetimibe (Zetia) tab 10 mg, 10 mg, Oral, Nightly    midodrine (ProAmatine) tab 5 mg, 5 mg, Oral, TID    [Held by provider] torsemide (Demadex) tab 20 mg, 20 mg, Oral, Daily    [START ON 7/30/2024] tamsulosin (Flomax) cap 0.4 mg, 0.4 mg, Oral, Daily @ 0700    [START ON 7/30/2024] cholecalciferol (Vitamin D3) tab 1,000 Units, 1,000 Units, Oral, Daily    glucose (Dex4) 15 GM/59ML oral liquid 15 g, 15 g, Oral, Q15 Min PRN **OR** glucose (Glutose) 40% oral gel 15 g, 15 g, Oral, Q15 Min PRN **OR** glucose-vitamin C (Dex-4) chewable tab 4 tablet, 4 tablet, Oral, Q15 Min PRN **OR** dextrose 50% injection 50 mL, 50 mL, Intravenous, Q15 Min PRN **OR** glucose (Dex4) 15 GM/59ML oral liquid 30 g, 30 g, Oral, Q15 Min PRN **OR** glucose (Glutose) 40% oral gel 30 g, 30 g, Oral, Q15 Min PRN **OR** glucose-vitamin C (Dex-4) chewable tab 8 tablet, 8 tablet, Oral, Q15 Min PRN    insulin aspart (NovoLOG) 100 Units/mL FlexPen 1-5 Units, 1-5 Units, Subcutaneous, TID AC and HS    heparin (Porcine) 5000 UNIT/ML injection 5,000 Units, 5,000 Units, Subcutaneous, Q8H JEFERSON    clopidogrel (Plavix) tab 75 mg, 75 mg, Oral, Daily  Home Medications:  No current outpatient medications on file.       Review of Systems:  See HPI; A total of 12 systems reviewed and otherwise unremarkable.    Physical Exam:  Vital signs: Blood pressure 123/57, pulse 56, temperature 97.7 °F (36.5 °C), temperature source Oral, resp.  rate 19, weight 166 lb 0.1 oz (75.3 kg), SpO2 100%.  General: No acute distress. Alert and oriented x 3.  HEENT: Moist mucous membranes. EOM-I. PERRL  Neck: No lymphadenopathy.  No JVD. No carotid bruits.  Respiratory: Clear to auscultation bilaterally.  No wheezes. No rhonchi.  Cardiovascular: S1, S2.  Regular rate and rhythm.  No murmurs. Equal pulses   Abdomen: Soft, nontender, nondistended.  Positive bowel sounds. No rebound tenderness   Neurologic: No focal neurological deficits.   Musculoskeletal: Full range of motion of all extremities.  No swelling noted.   Integument: No lesions. No erythema.  Psychiatric: Appropriate mood and affect.    Laboratory:  Lab Results   Component Value Date    WBC 6.6 07/29/2024    HGB 9.0 07/29/2024    HCT 25.9 07/29/2024    .0 07/29/2024    CREATSERUM 2.16 07/29/2024    BUN 36 07/29/2024     07/29/2024    K 3.8 07/29/2024     07/29/2024    CO2 26.0 07/29/2024     07/29/2024    CA 8.9 07/29/2024          BUN (mg/dL)   Date Value   07/29/2024 36 (H)   06/16/2024 45 (H)   06/15/2024 47 (H)   01/07/2012 31 (H)     CREATININE (mg/dL)   Date Value   01/07/2012 1.5 (H)     Creatinine (mg/dL)   Date Value   07/29/2024 2.16 (H)   06/16/2024 2.76 (H)   06/15/2024 3.00 (H)         Imaging:  Revieed     Impression:  CKD- stg 3/4; follows @ [facility]; pt has hx of DKD/HTN + s/p nephrectomy in past.  Has been monitored closely for CKD status  DM  CAD- plan for LHC; risk of ROCK is intermediate; will try to minimize w/ pre-procedural hydration. Hold diuretic  HTN- stable; monitor     Thank you for allowing me to participate in this patient's care.  Please feel free to call me with any questions or concerns.    Kelvin Ramsey MD  7/29/2024  3:29 PM

## 2024-07-29 NOTE — DISCHARGE INSTRUCTIONS
Resume [facility] at discharge    To schedule your monitored outpatient cardiac rehabilitation orientation at [place], please call [phone].  The program usually  begins about 4 weeks after your procedure.  An order from your cardiologist is required.    HOME CARE INSTRUCTIONS FOLLOWING CORONARY ANGIOGRAPHY, ANGIOPLASTY (PTCA/PTA) OR INSERTION OF STENT IN THE CORONARY ARTERIES        Activity  DO NOT drive after the procedure.  You may resume driving late the following day according to the nurse or physician's instructions  Plan on resting and relaxing tonight and tomorrow  Resume your normal activity after 48 hours, or as instructed by your physician  Do not lift anything over 10 pounds for the next 24 hours  Avoid drinking alcohol for the next 24 hours  If the groin site was used, avoid repeated stair use and excessive walking for the next 24 hours  If the wrist was used, avoid bending/flexing of the wrist for the next 24 hours     What is Normal?  A small lump at the procedure site associated with mild tenderness when touched  The procedure site may be bruised or discolored  There may be a small amount of drainage on the bandage     Special Instructions  Drink plenty of fluids during the next 24 hours to \"flush\" the contrast from your system  The bandage is to remain in place for 24 hours  Keep the bandage clean and dry  DO NOT submerge the procedure site for 72 hours (no bath tubs or pools)  This includes dishwashing/submersion of the wrist, if the wrist was used  After 24 hours, you must remove the bandage  You should shower after removing the bandage, and wash the procedure site gently with soap and water  If you choose to wear a bandage for a few days, make sure it remains clean and dry and that it is changed daily     When you should NOTIFY YOUR PHYSICIAN  Bleeding can occur at the procedure site - both on the outside of the skin and/or beneath the surface of the skin  Swelling or a large lump at  the procedure site can occur, which may be accompanied by moderate to severe pain     If either of the above occurs, lie down flat.  Have someone apply pressure to the procedure site with both hands, as instructed by the nurse.  Hold pressure for 20 minutes and the bleeding should stop.  Notify your physician of the occurrence  If the bleeding does not stop, call 911 and continue to apply pressure     If you experience signs of a fever, temperature > 101°, chills, infection (redness, swelling, thick yellow drainage, or a foul odor from the procedure site)  If you notice any numbness, tingling, or loss of feeling to your leg or foot or groin access  If you notice any numbness, tingling, or loss of feeling to your fingers or hand, if wrist access was utilized     If You Received a Stent:     You will remain on an antiplatelet drug and/or aspirin.  Antiplatelet medications are usually taken for six months to one year and should not be stopped unless your cardiologist directs you to do so.  These medications help to prevent blockage at the stent site.  If another physician or dentist asks you to stop your antiplatelet medication, you need to consult your cardiologist first.  Together, your cardiologist and other physician can discuss the risks that may be involved if you are not taking the antiplatelet medication   If an MRI is necessary, it may be done 4-6 weeks after your procedure.  Verify this with your cardiologist  Keep your stent card with you at all times!  If you need an MRI in the future, your stent card will need to be shown to the technologist before performing the MRI.  A duplicate card CANNOT be reproduced.     Other  You may resume your present diet, unless otherwise specified by your physician.  You may resume all of your medications as prescribed, unless otherwise directed by your physician.  A list of your medications was provided to you at discharge.  Continue the walking program initiated in the  hospital and progress your walking as directed.  Or, gradually resume your previous aerobic exercise schedule as tolerated.  Please call your physician’s office for a follow-up appointment.  You should be seen in 2 weeks.

## 2024-07-29 NOTE — PLAN OF CARE
NURSING ADMISSION NOTE      Patient admitted via Cart  Oriented to room.  Safety precautions initiated.  Bed in low position.  Call light in reach.  Patient alert and oriented x 4. On RA. Up with x1 w/ walker. NSR/SB on tele. Continent of bowel/bladder. No complaints of pain, shortness of breath, chest pain/discomfort. POC: IVF, LHC tomorrow. Call light within reach. Fall precautions in place. Admission navigator completed with patient. Patient and family updated on POC, all questions answered at this time. Patient is Azeri speaking, family bedside to help translate.     Problem: Patient/Family Goals  Goal: Patient/Family Long Term Goal  Description: Patient's Long Term Goal: to go home    Interventions:  - consults to see  -[facility]   - See additional Care Plan goals for specific interventions  Outcome: Progressing  Goal: Patient/Family Short Term Goal  Description: Patient's Short Term Goal: to feel better    Interventions:   - [facility]  -meds as ordered  -IV fluids  - See additional Care Plan goals for specific interventions  Outcome: Progressing     Problem: CARDIOVASCULAR - ADULT  Goal: Maintains optimal cardiac output and hemodynamic stability  Description: INTERVENTIONS:  - Monitor vital signs, rhythm, and trends  - Monitor for bleeding, hypotension and signs of decreased cardiac output  - Evaluate effectiveness of vasoactive medications to optimize hemodynamic stability  - Monitor arterial and/or venous puncture sites for bleeding and/or hematoma  - Assess quality of pulses, skin color and temperature  - Assess for signs of decreased coronary artery perfusion - ex. Angina  - Evaluate fluid balance, assess for edema, trend weights  Outcome: Progressing  Goal: Absence of cardiac arrhythmias or at baseline  Description: INTERVENTIONS:  - Continuous cardiac monitoring, monitor vital signs, obtain 12 lead EKG if indicated  - Evaluate effectiveness of antiarrhythmic and heart rate control medications as ordered  -  Initiate emergency measures for life threatening arrhythmias  - Monitor electrolytes and administer replacement therapy as ordered  Outcome: Progressing

## 2024-07-29 NOTE — H&P
[facility] Hospitalist H&P       CC: No chief complaint on file.       PCP: Hiram Nugent MD    History of Present Illness: Patient is a 83 year old male with PMH sig for CAD sp pci, ckd 3, chronic diastolic chf, htn, hld, DM II who presents for scheduled procedure. Pt has plans for PCI of LAD with cardiology. Admitted prior to procedure for IVF and nephrology evaluation.  He denies any current cp or dyspnea. No fever, chill, n/v, abd pain.       PMH  Past Medical History:    Arthritis    Congestive heart disease (HCC)    Diabetes (HCC)    Essential hypertension    High blood pressure    High cholesterol    History of blood clots    Muscle weakness    Renal disorder    Sleep apnea    non compliant with CPAP        PSH  Past Surgical History:   Procedure Laterality Date    Nephrectomy N/A     Other surgical history N/A     nephrectomy        ALL:  Allergies   Allergen Reactions    Losartan UNKNOWN    Lisinopril ITCHING        Home Medications:  Outpatient Medications Marked as Taking for the 7/29/24 encounter (Hospital Encounter)   Medication Sig Dispense Refill    tamsulosin 0.4 MG Oral Cap Take 1 capsule (0.4 mg total) by mouth As Directed.      torsemide 20 MG Oral Tab Take 1 tablet (20 mg total) by mouth daily.      ezetimibe 10 MG Oral Tab Take 1 tablet (10 mg total) by mouth nightly.      midodrine 5 MG Oral Tab Take 1 tablet (5 mg total) by mouth in the morning and 1 tablet (5 mg total) at noon and 1 tablet (5 mg total) in the evening.      aspirin 81 MG Oral Chew Tab Chew 1 tablet (81 mg total) by mouth daily. 30 tablet 0    insulin glargine 100 UNIT/ML Subcutaneous Solution Inject 8 Units into the skin nightly.      atorvastatin 80 MG Oral Tab Take 1 tablet (80 mg total) by mouth nightly.      clopidogrel 75 MG Oral Tab Take 1 tablet (75 mg total) by mouth daily.      calcitriol 0.25 MCG Oral Cap Take 1 capsule (0.25 mcg total) by mouth every other day.      Vitamin D3, Cholecalciferol, 25 MCG (1000 UT)  Oral Tab Take 1 tablet (1,000 Units total) by mouth daily.         Soc Hx  Social History     Tobacco Use    Smoking status: Never    Smokeless tobacco: Never   Substance Use Topics    Alcohol use: Never        Fam Hx  No family history on file.    Review of Systems  Comprehensive ROS reviewed and negative except for what's stated above.     OBJECTIVE:  /57 (BP Location: Left arm)   Pulse 56   Temp 97.7 °F (36.5 °C) (Oral)   Resp 19   Wt 166 lb 0.1 oz (75.3 kg)   SpO2 100%   BMI 27.62 kg/m²     General:  Alert, no acute distress  HEENT:  Normocephalic, atraumatic.  Sclera anicteric, EOMI   Neck:  Supple   Lungs:  CTAB  Chest wall:  No tenderness or deformity.  Heart:  RRR, no LE edema   Abdomen:  Soft, non-tender. Non distended. + BS   Extremities:  o cyanosis or edema.  Skin:   No rashes or lesions.   Neurologic:  Normal strength, no focal deficit appreciated        Diagnostic Data:    CBC/Chem  Recent Labs   Lab 07/29/24  1313   WBC 6.6   HGB 9.0*   MCV 91.8   .0       Recent Labs   Lab 07/29/24  1313   *   K 3.8      CO2 26.0   BUN 36*   CREATSERUM 2.16*   *   CA 8.9       No results for input(s): \"ALT\", \"AST\", \"ALB\", \"AMYLASE\", \"LIPASE\", \"LDH\" in the last 168 hours.    Invalid input(s): \"ALPHOS\", \"TBIL\", \"DBIL\", \"TPROT\"    No results for input(s): \"TROP\" in the last 168 hours.         Radiology: No results found.      ASSESSMENT / PLAN:     Assessment:    Patient is a 83 year old male with PMH sig for CAD sp pci, ckd 3, chronic diastolic chf, htn, hld, DM II who presents for scheduled procedure. Pt has plans for PCI of LAD with cardiology.         Plan:  CAD  HFpEF  Hld  - plan for PCI, timing per cards  - cards consulted  - cont asa, plavix, statin, zetia  - no longer on coreg due to hx bradycardia  - holding pta torsemide  - on midodrine    Ckd 3  - cr close to baseline   - ivf piror to cath  - nephro consulted   - monitor cr    Bph  - flomax     DM II  - hold long acting  insulin for now  - accu checks, ssi     Chronic anemia  - close to recent baseline  - trend    DVT Prophy: heparin sc  Code Status:  full  Dispo: pending above        Patient and/or patient's family given opportunity to ask questions and note understanding and agreeing with therapeutic plan as outlined    Thank You,    DO COLLINS [facility] Hospital[facility]  Answering Service number: [phone]

## 2024-07-29 NOTE — CM/SW NOTE
Received order for HHC. Noted that pt is current with [facility]. Resumption order and updates sent to [facility] via [employer].     Ewa Sifuentes, MSW, LSW  Discharge Planner

## 2024-07-30 ENCOUNTER — APPOINTMENT (OUTPATIENT)
Dept: INTERVENTIONAL RADIOLOGY/VASCULAR | Facility: HOSPITAL | Age: 84
End: 2024-07-30
Attending: INTERNAL MEDICINE
Payer: MEDICARE

## 2024-07-30 LAB
ANION GAP SERPL CALC-SCNC: 5 MMOL/L (ref 0–18)
ANION GAP SERPL CALC-SCNC: 6 MMOL/L (ref 0–18)
BUN BLD-MCNC: 35 MG/DL (ref 9–23)
BUN BLD-MCNC: 40 MG/DL (ref 9–23)
CALCIUM BLD-MCNC: 8.8 MG/DL (ref 8.7–10.4)
CALCIUM BLD-MCNC: 9 MG/DL (ref 8.7–10.4)
CHLORIDE SERPL-SCNC: 102 MMOL/L (ref 98–112)
CHLORIDE SERPL-SCNC: 105 MMOL/L (ref 98–112)
CO2 SERPL-SCNC: 26 MMOL/L (ref 21–32)
CO2 SERPL-SCNC: 26 MMOL/L (ref 21–32)
CREAT BLD-MCNC: 2.02 MG/DL
CREAT BLD-MCNC: 2.08 MG/DL
EGFRCR SERPLBLD CKD-EPI 2021: 31 ML/MIN/1.73M2 (ref 60–?)
EGFRCR SERPLBLD CKD-EPI 2021: 33 ML/MIN/1.73M2 (ref 60–?)
ERYTHROCYTE [DISTWIDTH] IN BLOOD BY AUTOMATED COUNT: 13.1 %
GLUCOSE BLD-MCNC: 101 MG/DL (ref 70–99)
GLUCOSE BLD-MCNC: 128 MG/DL (ref 70–99)
GLUCOSE BLD-MCNC: 134 MG/DL (ref 70–99)
GLUCOSE BLD-MCNC: 151 MG/DL (ref 70–99)
GLUCOSE BLD-MCNC: 85 MG/DL (ref 70–99)
GLUCOSE BLD-MCNC: 94 MG/DL (ref 70–99)
HCT VFR BLD AUTO: 26.1 %
HGB BLD-MCNC: 9.1 G/DL
ISTAT ACTIVATED CLOTTING TIME: 397 SECONDS (ref 74–137)
MAGNESIUM SERPL-MCNC: 1.9 MG/DL (ref 1.6–2.6)
MCH RBC QN AUTO: 31.8 PG (ref 26–34)
MCHC RBC AUTO-ENTMCNC: 34.9 G/DL (ref 31–37)
MCV RBC AUTO: 91.3 FL
OSMOLALITY SERPL CALC.SUM OF ELEC: 289 MOSM/KG (ref 275–295)
OSMOLALITY SERPL CALC.SUM OF ELEC: 290 MOSM/KG (ref 275–295)
PLATELET # BLD AUTO: 159 10(3)UL (ref 150–450)
POTASSIUM SERPL-SCNC: 3.7 MMOL/L (ref 3.5–5.1)
POTASSIUM SERPL-SCNC: 3.7 MMOL/L (ref 3.5–5.1)
RBC # BLD AUTO: 2.86 X10(6)UL
SODIUM SERPL-SCNC: 134 MMOL/L (ref 136–145)
SODIUM SERPL-SCNC: 136 MMOL/L (ref 136–145)
WBC # BLD AUTO: 7 X10(3) UL (ref 4–11)

## 2024-07-30 PROCEDURE — B211YZZ FLUOROSCOPY OF MULTIPLE CORONARY ARTERIES USING OTHER CONTRAST: ICD-10-PCS | Performed by: INTERNAL MEDICINE

## 2024-07-30 PROCEDURE — 4A023N7 MEASUREMENT OF CARDIAC SAMPLING AND PRESSURE, LEFT HEART, PERCUTANEOUS APPROACH: ICD-10-PCS | Performed by: INTERNAL MEDICINE

## 2024-07-30 PROCEDURE — B215YZZ FLUOROSCOPY OF LEFT HEART USING OTHER CONTRAST: ICD-10-PCS | Performed by: INTERNAL MEDICINE

## 2024-07-30 PROCEDURE — B240ZZ3 ULTRASONOGRAPHY OF SINGLE CORONARY ARTERY, INTRAVASCULAR: ICD-10-PCS | Performed by: INTERNAL MEDICINE

## 2024-07-30 PROCEDURE — 027034Z DILATION OF CORONARY ARTERY, ONE ARTERY WITH DRUG-ELUTING INTRALUMINAL DEVICE, PERCUTANEOUS APPROACH: ICD-10-PCS | Performed by: INTERNAL MEDICINE

## 2024-07-30 PROCEDURE — 99232 SBSQ HOSP IP/OBS MODERATE 35: CPT | Performed by: INTERNAL MEDICINE

## 2024-07-30 RX ORDER — HEPARIN SODIUM 5000 [USP'U]/ML
INJECTION, SOLUTION INTRAVENOUS; SUBCUTANEOUS
Status: COMPLETED
Start: 2024-07-30 | End: 2024-07-30

## 2024-07-30 RX ORDER — MIDAZOLAM HYDROCHLORIDE 1 MG/ML
INJECTION INTRAMUSCULAR; INTRAVENOUS
Status: COMPLETED
Start: 2024-07-30 | End: 2024-07-30

## 2024-07-30 RX ORDER — IODIXANOL 320 MG/ML
100 INJECTION, SOLUTION INTRAVASCULAR
Status: DISCONTINUED | OUTPATIENT
Start: 2024-07-30 | End: 2024-07-31

## 2024-07-30 RX ORDER — NITROGLYCERIN 20 MG/100ML
INJECTION INTRAVENOUS
Status: COMPLETED
Start: 2024-07-30 | End: 2024-07-30

## 2024-07-30 RX ORDER — LIDOCAINE HYDROCHLORIDE 10 MG/ML
INJECTION, SOLUTION EPIDURAL; INFILTRATION; INTRACAUDAL; PERINEURAL
Status: COMPLETED
Start: 2024-07-30 | End: 2024-07-30

## 2024-07-30 RX ORDER — SODIUM CHLORIDE 9 MG/ML
INJECTION, SOLUTION INTRAVENOUS
Status: CANCELLED | OUTPATIENT
Start: 2024-07-31 | End: 2024-07-31

## 2024-07-30 RX ORDER — VERAPAMIL HYDROCHLORIDE 2.5 MG/ML
INJECTION, SOLUTION INTRAVENOUS
Status: COMPLETED
Start: 2024-07-30 | End: 2024-07-30

## 2024-07-30 NOTE — PROGRESS NOTES
Assumed care of patient ~1530 and leaving to go to cath lab. Patient is Aox4; Kinyarwanda speaking and family bedside to help translate. Sinus Marty on tele. Continent of bowl/bladder. POC: cath recovery.     1655: Patient back from cath lab. R wrist site is clean/dry/intact; soft, no hematoma. Fluids running for 6 hrs post cath per nephrology

## 2024-07-30 NOTE — PLAN OF CARE
Problem: Patient/Family Goals  Goal: Patient/Family Long Term Goal  Description: Patient's Long Term Goal: to go home    Interventions:  - consults to see  -LHC   - See additional Care Plan goals for specific interventions  Outcome: Progressing  Goal: Patient/Family Short Term Goal  Description: Patient's Short Term Goal: to feel better    Interventions:   - C  -meds as ordered  -IV fluids  - See additional Care Plan goals for specific interventions  Outcome: Progressing

## 2024-07-30 NOTE — CDS QUERY
Dear Dr Vivar,     Please clarify the severity of chronic kidney disease     [  ] Chronic Kidney Disease Stage 3 (moderate) with eGFR 30-59  [  ] Chronic Kidney Disease Stage 4 (severe) with eGFR 15-29  [  ] Other (please specify):       Documentation from the Medical Record    Clinical Indicators: 7/29 83 Y/M- elective PCI of LAD, admit for pre- hydration    Admission GFR labs: 30> 31> 33    Historic GFR labs 06/2024: 20> 22    7/29 Hospitalist PN- Ckd 3     7/29 Cardiology PN- CKD 4 - nephrectomy, b/l Cr ~ 2.5  -He presents one day prior to PCI for management of his CKD 4.     7/29 Nephrology PN- CKD- stg 3/4; follows @ ; pt has hx of DKD/HTN + s/p nephrectomy in past.  Has been monitored closely for CKD status     Risks: nephrectomy, DM, HTN, IV contrast    Treatment: IVF hydration prior to angiogram, nephrology consult, monitor Cr         Use of terms such as suspected, possible, or probable (associated with a specific diagnosis that is being evaluated, monitored, or treated as if it exists) are acceptable and can be coded in the inpatient setting, when documented at the time of discharge.     Please add any additional documentation to your progress note and continue to document this through discharge.         For questions, please contact Clinical : Marisabel Main RN, BSN. [phone] Thank you.    THIS FORM IS A PERMANENT PART OF THE MEDICAL RECORD

## 2024-07-30 NOTE — PROGRESS NOTES
[facility]  Progress Note    Bonifacio Mccoy Patient Status:  Inpatient    1940 MRN [id number]   [facility] 8NE-A Attending Eliza Vivar,    Hosp Day # 1 PCP Hiram Nugent MD        No acute events  Awaiting [facility]      Current Facility-Administered Medications:     sodium chloride 0.9% infusion, , Intravenous, Continuous    acetaminophen (Tylenol Extra Strength) tab 500 mg, 500 mg, Oral, Q4H PRN    acetaminophen (Tylenol) tab 650 mg, 650 mg, Oral, Q4H PRN **OR** HYDROcodone-acetaminophen (Norco) 5-325 MG per tab 1 tablet, 1 tablet, Oral, Q4H PRN **OR** HYDROcodone-acetaminophen (Norco) 5-325 MG per tab 2 tablet, 2 tablet, Oral, Q4H PRN    morphINE PF 2 MG/ML injection 1 mg, 1 mg, Intravenous, Q2H PRN **OR** morphINE PF 2 MG/ML injection 2 mg, 2 mg, Intravenous, Q2H PRN **OR** morphINE PF 4 MG/ML injection 4 mg, 4 mg, Intravenous, Q2H PRN    polyethylene glycol (PEG 3350) (Miralax) 17 g oral packet 17 g, 17 g, Oral, Daily PRN    sennosides (Senokot) tab 17.2 mg, 17.2 mg, Oral, Nightly PRN    bisacodyl (Dulcolax) 10 MG rectal suppository 10 mg, 10 mg, Rectal, Daily PRN    ondansetron (Zofran) 4 MG/2ML injection 4 mg, 4 mg, Intravenous, Q6H PRN    aspirin chewable tab 81 mg, 81 mg, Oral, Daily    atorvastatin (Lipitor) tab 80 mg, 80 mg, Oral, Nightly    [START ON 2024] calcitriol (Rocaltrol) cap 0.25 mcg, 0.25 mcg, Oral, QOD    ezetimibe (Zetia) tab 10 mg, 10 mg, Oral, Nightly    midodrine (ProAmatine) tab 5 mg, 5 mg, Oral, TID    [Held by provider] torsemide (Demadex) tab 20 mg, 20 mg, Oral, Daily    tamsulosin (Flomax) cap 0.4 mg, 0.4 mg, Oral, Daily @ 0700    cholecalciferol (Vitamin D3) tab 1,000 Units, 1,000 Units, Oral, Daily    glucose (Dex4) 15 GM/59ML oral liquid 15 g, 15 g, Oral, Q15 Min PRN **OR** glucose (Glutose) 40% oral gel 15 g, 15 g, Oral, Q15 Min PRN **OR** glucose-vitamin C (Dex-4) chewable tab 4 tablet, 4 tablet, Oral, Q15 Min PRN **OR** dextrose 50% injection  50 mL, 50 mL, Intravenous, Q15 Min PRN **OR** glucose (Dex4) 15 GM/59ML oral liquid 30 g, 30 g, Oral, Q15 Min PRN **OR** glucose (Glutose) 40% oral gel 30 g, 30 g, Oral, Q15 Min PRN **OR** glucose-vitamin C (Dex-4) chewable tab 8 tablet, 8 tablet, Oral, Q15 Min PRN    insulin aspart (NovoLOG) 100 Units/mL FlexPen 1-5 Units, 1-5 Units, Subcutaneous, TID AC and HS    heparin (Porcine) 5000 UNIT/ML injection 5,000 Units, 5,000 Units, Subcutaneous, Q8H JEFERSON    clopidogrel (Plavix) tab 75 mg, 75 mg, Oral, Daily    metoclopramide (Reglan) 5 mg/mL injection 5 mg, 5 mg, Intravenous, Q8H PRN     Physical Exam:  Vital signs: Blood pressure 135/53, pulse (!) 49, temperature 97.7 °F (36.5 °C), temperature source Oral, resp. rate 20, weight 166 lb 0.1 oz (75.3 kg), SpO2 98%.  General: No acute distress. Alert and oriented x 3.  HEENT: Moist mucous membranes. EOM-I. PERRL  Neck: No lymphadenopathy.  No JVD. No carotid bruits.  Respiratory: Clear to auscultation bilaterally.  No wheezes. No rhonchi.  Cardiovascular: S1, S2.  Regular rate and rhythm.  No murmurs. Equal pulses   Abdomen: Soft, nontender, nondistended.  Positive bowel sounds. No rebound tenderness   Neurologic: No focal neurological deficits.   Musculoskeletal: Full range of motion of all extremities.  No swelling noted.   Integument: No lesions. No erythema.  Psychiatric: Appropriate mood and affect.    Laboratory:  Lab Results   Component Value Date    WBC 7.0 07/30/2024    HGB 9.1 07/30/2024    HCT 26.1 07/30/2024    .0 07/30/2024    CREATSERUM 2.02 07/30/2024    BUN 35 07/30/2024     07/30/2024    K 3.7 07/30/2024     07/30/2024    CO2 26.0 07/30/2024    GLU 85 07/30/2024    CA 9.0 07/30/2024    MG 1.9 07/30/2024    PGLU 101 07/30/2024          BUN (mg/dL)   Date Value   07/30/2024 35 (H)   07/29/2024 40 (H)   07/29/2024 36 (H)   01/07/2012 31 (H)     CREATININE (mg/dL)   Date Value   01/07/2012 1.5 (H)     Creatinine (mg/dL)   Date Value    07/30/2024 2.02 (H)   07/29/2024 2.08 (H)   07/29/2024 2.16 (H)         Imaging:  Revieed     Impression:  CKD- stg 3/4; follows @ ; pt has hx of DKD/HTN + s/p nephrectomy in past.  Has been monitored closely for CKD status  DM  CAD- plan for LHC  today; risk of ROCK is intermediate; will try to minimize w/ pre-procedural hydration. Hold diuretic  HTN- stable; monitor     Thank you for allowing me to participate in this patient's care.  Please feel free to call me with any questions or concerns.    Kelvin Ramsey MD  7/30/2024

## 2024-07-30 NOTE — PROCEDURES
OPERATIVE REPORT - CARDIAC CATHETERIZATION    Date of Procedure: 7/30/2024     Performing Physician: Michael Ornelas MD    Pre-Procedure Diagnosis:   1. CAD  2. CKD    Post-Procedure Diagnosis:  1. Same as pre    Findings:  1. Left main: No stenosis  2. LAD: Prox 80%, several small diagonals  3. Left circumflex: OM1 diffuse 70%, AVG   4. RCA: Not studied  5. Hemodynamics: LVEDP 15 mmHg. No LV-Ao gradient on pullback.     Conclusions / Recommendations:  1. Successful staged, IVUS guided PCI of proximal LAD   2. Remove the TR band per protocol  3. Post cath IVF  4. Continue DAPT, best medical therapy    -----------------------    Procedures performed:   1. Left heart catheterization  2. Selective bilateral coronary angiography  3. Moderate sedation  4. Access of right radial artery    Indications: This is a 83 year old male presenting for left heart catheterization with coronary angiography to evaluate for obstructive CAD.     Description of Procedure: Informed consent was obtained from the patient in writing. The risks and benefits of the procedure were reviewed in detail with the patient, including but not limited to the risk of myocardial infarction, stroke, renal failure, bleeding, and death. After a thorough discussion of these risks and benefits, the patient agreed to proceed and signed an informed consent document. The patient was brought to the cardiac catheterization laboratory in the fasting state. Moderate sedation was employed using a total of IV Versed 2 mg and IV fentanyl 100 mcg in divided doses.  I directly observed the patient from 1548 to 1638, and an independent trained observer was present and assisted in the monitoring of the patient's level of consciousness and physiological status, heart rate, blood pressure, oximetry, and rhythm. All operators present for the case performed standard pre-procedural prep including hand washing, sterile gloves, gown, mask, and cap. All aspects of the maximum  sterile barrier technique were followed. A preprocedural time out was performed with all physicians, technologists, and nurses involved with the procedure. 1% lidocaine was infiltrated subcutaneously in the right wrist for local anesthesia. Access was obtained in the right radial artery with a 5/6F Slender Glidesheath using the Seldinger technique and a micropuncture needle with a single anterior wall puncture. Standard vasodilator cocktail was given with nitroglycerin 200 mcg and verapamil 2.5 mg through the arterial sheath. A 6F EBU 3.5 guide catheter was advanced over a J tipped wire through the arterial sheath and placed in the aortic root, then used to selectively engage the left coronary artery. A Stefano blue was advanced to the distal LAD. IVUS performed for sizing. Predilated with 2.5 x 15 mm balloon, full balloon expansion seen. Stented with 3.0 x 20 mm Synergy to 18 omari. Post dilated with 3.25 x 12 NC to 18 omari. IVUS again, showed incomplete stent expansion in the mid segment, then post dilated with 3.5 x 6mm to 26 omari with good stent expansion and apposition seen. JOANNA 3 flow, no dissection or perforation. Wire removed. The catheter was then exchanged for a pigtail and prolapsed into the LV over a wire and placed at the base of the LV. LV systolic and end diastolic pressure was then recorded. The catheter was pulled back and pullback measurements of LV and aortic pressure and recorded. The catheter was then removed over a wire. At the conclusion of the procedure, all catheters and wires were removed over a wire. The arterial sheath was removed and hemostasis achieved with standard TR band using patent hemostasis technique. There was no bleeding or hematoma. The patient tolerated the procedure well without complication. EBL <10 mL. Total contrast ~ 10 mL. See procedure log for fluoro time and radiation dose.      Michael Ornelas MD  Interventional Cardiology  [facility]

## 2024-07-30 NOTE — PAYOR COMM NOTE
--------------  ADMISSION REVIEW     Payor: [facility]  Subscriber #:  [id number]  Authorization Number: [id number]    Admit date: 7/29/24  Admit time: 11:51 AM       REVIEW DOCUMENTATION:   7-29-24      History of Present Illness: Patient is a 83 year old male with PMH sig for CAD sp pci, ckd 3, chronic diastolic chf, htn, hld, DM II who presents for scheduled procedure. Pt has plans for PCI of LAD with cardiology. Admitted prior to procedure for IVF and nephrology evaluation.     General:  Alert, no acute distress  HEENT:  Normocephalic, atraumatic.  Sclera anicteric, EOMI   Neck:  Supple   Lungs:  CTAB  Chest wall:  No tenderness or deformity.  Heart:  RRR, no LE edema   Abdomen:  Soft, non-tender. Non distended. + BS   Extremities:  o cyanosis or edema.  Skin:   No rashes or lesions.   Neurologic:  Normal strength, no focal deficit appreciated           Diagnostic Data:    CBC/Chem      Recent Labs   Lab 07/29/24  1313   WBC 6.6   HGB 9.0*   MCV 91.8   .0             Recent Labs   Lab 07/29/24  1313   *   K 3.8      CO2 26.0   BUN 36*   CREATSERUM 2.16*   *   CA 8.9      ASSESSMENT / PLAN:      Assessment:     Patient is a 83 year old male with PMH sig for CAD sp pci, ckd 3, chronic diastolic chf, htn, hld, DM II who presents for scheduled procedure. Pt has plans for PCI of LAD with cardiology.            Plan:  CAD  HFpEF  Hld  - plan for PCI, timing per cards  - cards consulted  - cont asa, plavix, statin, zetia  - no longer on coreg due to hx bradycardia  - holding pta torsemide  - on midodrine     Ckd 3  - cr close to baseline   - ivf piror to cath  - nephro consulted   - monitor cr     Bph  - flomax      DM II  - hold long acting insulin for now  - accu checks, ssi      Chronic anemia  - close to recent baseline  - trend     DVT Prophy: heparin sc  Code Status:  full      NEPHROLOGY CONSULT      Reason for Consultation:  CKD     History of Present Illness:  Bonifacio  María Mccoy is a a(n) 83 year old male with hx of CKD 3, CHF, DM, HTN who is admitted for eval of of CAD; plan for C w/ intervention. Paitent admitted for pre-procedural hydration.  Cr @ baseline ~ 2-3; on admit labs Cr 2.16  Denies any cp/sob  No n/v  No f/c  Currently on fluids     Cardiology  Consultation Note    General: Awake and alert; in no acute distress  HEENT: Extraocular movements are intact; sclerae are anicteric; scalp is atraumatic  Neck: Supple; no JVD; no carotid bruits  Cardiac: Regular rate and regular rhythm; normal S1 and S2, no murmurs, rubs, or gallops are appreciated  Lungs: Clear to auscultation bilaterally; no accessory muscle use is noted, no wheezes, rhonci or rales  Abdomen: Soft, non-distended, non-tender; bowel sounds are normoactive  Extremities: Warm, no edema, clubbing or cyanosis; moves all 4 extremities normally, distal pulses intact and equal  Psychiatric: Normal mood and affect; answers questions appropriately  Dermatologic: No rashes; normal skin turgor    Impression:  CAD - multivessel, deemed poor surgical candidate. S/p PCI RCA, now here for staged PCI LAD  Chest burning - resolved post PCI  Chronic edema - on torsemide, per renal  CKD 4 - nephrectomy, b/l Cr ~ 2.5  HL   HTN  DM2   Syncope - dehydration, after inadvertently taking a second dose of diuretic  Orthostatic hypotension     Recommendations:  Neeru procedural IVF, torsemide held, per renal   PCI LAD tomorrow, will limit contrast use  ASA, statin, plavix, Zetia,   Midodrine        MEDICATIONS ADMINISTERED IN LAST 1 DAY:  aspirin chewable tab 81 mg       Date Action Dose Route User    7/30/2024 0908 Given 81 mg Oral Daniela Madera RN          atorvastatin (Lipitor) tab 80 mg       Date Action Dose Route User    7/29/2024 2135 Given 80 mg Oral Laura Reich RN          clopidogrel (Plavix) tab 75 mg       Date Action Dose Route User    7/30/2024 0908 Given 75 mg Oral Daniela Madera RN    7/29/2024  1527 Given 75 mg Oral Shawna Boone RN          ezetimibe (Zetia) tab 10 mg       Date Action Dose Route User    7/29/2024 2135 Given 10 mg Oral Laura Reich RN          heparin (Porcine) 5000 UNIT/ML injection 5,000 Units       Date Action Dose Route User    7/30/2024 0610 Given 5,000 Units Subcutaneous (Left Lower Abdomen) Laura Reich RN    7/29/2024 2135 Given 5,000 Units Subcutaneous (Right Lower Abdomen) Laura Reich RN    7/29/2024 1527 Given 5,000 Units Subcutaneous (Left Lower Abdomen) Shawna Boone RN          insulin aspart (NovoLOG) 100 Units/mL FlexPen 1-5 Units       Date Action Dose Route User    7/29/2024 2136 Given 2 Units Subcutaneous (Left Lower Abdomen) Laura Reich RN          midodrine (ProAmatine) tab 5 mg       Date Action Dose Route User    7/30/2024 0610 Given 5 mg Oral Laura Reich RN    7/29/2024 1722 Given 5 mg Oral Shawna Boone RN          sodium chloride 0.9% infusion       Date Action Dose Route User    7/30/2024 0243 New Bag (none) Intravenous Laura Reich RN    7/29/2024 1417 New Bag (none) Intravenous Shawna Boone RN          tamsulosin (Flomax) cap 0.4 mg       Date Action Dose Route User    7/30/2024 0610 Given 0.4 mg Oral Laura Reich RN          cholecalciferol (Vitamin D3) tab 1,000 Units       Date Action Dose Route User    7/30/2024 0908 Given 1,000 Units Oral Daniela Madera RN            Vitals (last day)       Date/Time Temp Pulse Resp BP SpO2 Weight O2 Device O2 Flow Rate (L/min) [facility]    07/30/24 1153 97.5 °F (36.4 °C) 51 19 146/59 100 % -- None (Room air) -- NN    07/30/24 0756 97.7 °F (36.5 °C) 49 20 135/53 98 % -- None (Room air) -- NN    07/30/24 0515 98.3 °F (36.8 °C) 56 18 121/52 98 % -- None (Room air) 0 L/min     07/29/24 2310 97.7 °F (36.5 °C) 59 18 128/51 100 % -- None (Room air) 0 L/min     07/29/24 2110 -- 44 -- -- 100 % -- -- --     07/29/24 1907 98.7 °F (37.1 °C) 42 18  136/53 100 % -- -- --     07/29/24 1621 97.6 °F (36.4 °C) 51 20 131/51 100 % -- None (Room air) -- NN    07/29/24 1238 -- 56 -- 123/57 100 % 166 lb 0.1 oz (75.3 kg) -- --     07/29/24 1236 -- 56 -- 133/54 -- -- -- --     07/29/24 1232 97.7 °F (36.5 °C) 52 19 119/51 -- -- None (Room air) -- NN

## 2024-07-30 NOTE — PROGRESS NOTES
[facility] Hospitalist Progress Note     CC: Hospital Follow up    PCP: Hiram Nugent MD    SUBJECTIVE:  No CP, SOB, or N/V.       OBJECTIVE:  Temp:  [97.5 °F (36.4 °C)-98.7 °F (37.1 °C)] 97.5 °F (36.4 °C)  Pulse:  [42-59] 51  Resp:  [18-20] 19  BP: (121-146)/(51-59) 146/59  SpO2:  [98 %-100 %] 100 %    Intake/Output:    Intake/Output Summary (Last 24 hours) at 7/30/2024 1238  Last data filed at 7/30/2024 1153  Gross per 24 hour   Intake 600 ml   Output 3075 ml   Net -2475 ml       Last 3 Weights   07/29/24 1238 166 lb 0.1 oz (75.3 kg)   07/23/24 0928 165 lb (74.8 kg)   06/15/24 1225 171 lb 15.3 oz (78 kg)       Exam  Gen: No acute distress   Heent: NC AT, EOMI, neck supple  Pulm: CTAB  CV: RRR, no le edema   Abd: Abdomen soft, nontender, nondistended, bowel sounds present  Skin: no rashes or lesions  Neuro: AOx3, no focal deficits   Psyc: appropriate mood and affect    Data Review:       Labs:     Recent Labs   Lab 07/29/24  1313 07/29/24  2330 07/30/24  0534   WBC 6.6 6.7 7.0   HGB 9.0* 8.9* 9.1*   MCV 91.8 90.1 91.3   .0 135.0* 159.0       Recent Labs   Lab 07/29/24  1313 07/29/24  2330 07/30/24  0534   * 134* 136   K 3.8 3.7 3.7    102 105   CO2 26.0 26.0 26.0   BUN 36* 40* 35*   CREATSERUM 2.16* 2.08* 2.02*   CA 8.9 8.8 9.0   MG  --   --  1.9   * 134* 85       No results for input(s): \"ALT\", \"AST\", \"ALB\", \"AMYLASE\", \"LIPASE\", \"LDH\" in the last 168 hours.    Invalid input(s): \"ALPHOS\", \"TBIL\", \"DBIL\", \"TPROT\"    Recent Labs   Lab 07/29/24  1803 07/29/24  2109 07/30/24  0519 07/30/24  1150   PGLU 133* 195* 101* 128*       No results for input(s): \"TROP\" in the last 168 hours.    Imaging:  No results found.      Meds:      aspirin  81 mg Oral Daily    atorvastatin  80 mg Oral Nightly    [START ON 7/31/2024] calcitriol  0.25 mcg Oral QOD    ezetimibe  10 mg Oral Nightly    midodrine  5 mg Oral TID    [Held by provider] torsemide  20 mg Oral Daily    tamsulosin  0.4 mg Oral Daily @ 0700     cholecalciferol  1,000 Units Oral Daily    insulin aspart  1-5 Units Subcutaneous TID AC and HS    heparin  5,000 Units Subcutaneous Q8H JEFERSON    clopidogrel  75 mg Oral Daily      sodium chloride 75 mL/hr at 07/30/24 0243       acetaminophen    acetaminophen **OR** HYDROcodone-acetaminophen **OR** HYDROcodone-acetaminophen    morphINE **OR** morphINE **OR** morphINE    polyethylene glycol (PEG 3350)    sennosides    bisacodyl    ondansetron    glucose **OR** glucose **OR** glucose-vitamin C **OR** dextrose **OR** glucose **OR** glucose **OR** glucose-vitamin C    metoclopramide       Assessment/Plan:     Active Problems:    CAD (coronary artery disease)    Primary hypertension      Assessment:     Patient is a 83 year old male with PMH sig for CAD sp pci, ckd 3, chronic diastolic chf, htn, hld, DM II who presents for scheduled procedure. Pt has plans for PCI of LAD with cardiology.        Plan:  CAD  HFpEF  Hld  - plan for PCI today per cards  - cont asa, plavix, statin, zetia  - no longer on coreg due to hx bradycardia  - holding pta torsemide  - on midodrine     Ckd 4  - cr better than previous baseline   - ivf per nephrology  - monitor cr     Bph  - flomax      DM II  - hold long acting insulin for now  - accu checks, ssi      Chronic anemia  - close to recent baseline  - trend     DVT Prophy: heparin sc  Code Status:  full  Dispo: possibly tomorrow      Questions/concerns were discussed with patient and/or family by bedside.       Thank You,  Eliza Vivar DO  [facility] Hospitalist

## 2024-07-30 NOTE — PLAN OF CARE
Assumed care of patient at 1930. Patient is A&Ox4, primary language is Azeri. Family members at the bedside. IVF infusing as ordered in preparation to left heart cath. Plan to re-check labs in AM. Patient and family verbalized understanding of the plan of care, questions addressed.       Problem: Patient/Family Goals  Goal: Patient/Family Long Term Goal  Description: Patient's Long Term Goal: to go home    Interventions:  - consults to see  -C   - See additional Care Plan goals for specific interventions  Outcome: Progressing  Goal: Patient/Family Short Term Goal  Description: Patient's Short Term Goal: to feel better    Interventions:   - [facility]  -meds as ordered  -IV fluids  - See additional Care Plan goals for specific interventions  Outcome: Progressing     Problem: CARDIOVASCULAR - ADULT  Goal: Maintains optimal cardiac output and hemodynamic stability  Description: INTERVENTIONS:  - Monitor vital signs, rhythm, and trends  - Monitor for bleeding, hypotension and signs of decreased cardiac output  - Evaluate effectiveness of vasoactive medications to optimize hemodynamic stability  - Monitor arterial and/or venous puncture sites for bleeding and/or hematoma  - Assess quality of pulses, skin color and temperature  - Assess for signs of decreased coronary artery perfusion - ex. Angina  - Evaluate fluid balance, assess for edema, trend weights  Outcome: Progressing  Goal: Absence of cardiac arrhythmias or at baseline  Description: INTERVENTIONS:  - Continuous cardiac monitoring, monitor vital signs, obtain 12 lead EKG if indicated  - Evaluate effectiveness of antiarrhythmic and heart rate control medications as ordered  - Initiate emergency measures for life threatening arrhythmias  - Monitor electrolytes and administer replacement therapy as ordered  Outcome: Progressing

## 2024-07-30 NOTE — PLAN OF CARE
THOMAS Benz notified that patient does not have cath orders yet. Also notified of creatinine of 2.02 this AM.

## 2024-07-30 NOTE — PLAN OF CARE
Patient is alert and oriented times four. Lungs clear on auscultation. Patient speaks mostly Welsh, but daughter is at bedside and says that he does understand English. He has no c/o pIn. IV of 0.9 NS infusing at 75 ml/hr. AM creatinine is 2.02. He is sinus deep on monitor.    Plan: obtain consent for  left heart cath today

## 2024-07-30 NOTE — PROGRESS NOTES
[facility] Cardiology  Consultation Note      Bonifacio Mccoy Patient Status:  Inpatient    1940 MRN [id number]   Location [facility] 8NE-A Attending Eliza Vivar,    Hosp Day # 1 PCP Hiram Nugent MD     Reason for consult: CAD    Subjective: Feels well. S/p PCI LAD today. 10 ml contrast only.     Impression:  CAD - multivessel, deemed poor surgical candidate. S/p PCI RCA, now s/p staged PCI LAD  Chest burning - resolved post PCI  Chronic edema - on torsemide, per renal  CKD 4 - nephrectomy, b/l Cr ~ 2.5  HL   HTN  DM2   Syncope - dehydration, after inadvertently taking a second dose of diuretic  Orthostatic hypotension    Recommendations:  Only 10 ml contrast used for PCI today. He has received adequate alysa procedural IVF. LVEDP 15mmHg, can hold torsemide today and resume at dc.  Continue ASA, plavix, statin, Zetia,   Midodrine  Monitor o/n and home tmrw    History of Present Illness:  Bonifacio Mccoy is a 83 year old male with multivessel CAD, deemed poor CABG surgery candidate, s/p recent PCI to RCA, who presented to [facility] on 2024 for elective staged PCI of LAD. He presents one day prior to PCI for management of his CKD 4. Since PCI of RCA he has felt improved. Denies CP or SOB. No edema. He did have syncope and was admitted just recently for this. Thought due to dehydration - evidently he may have took an extra dose of diuretics inadvertently.      Medications:  Current Facility-Administered Medications   Medication Dose Route Frequency    iodixanol (VISIPAQUE) 320 MG/ML injection 100 mL  100 mL Injection ONCE PRN    sodium chloride 0.9% infusion   Intravenous Continuous    acetaminophen (Tylenol Extra Strength) tab 500 mg  500 mg Oral Q4H PRN    acetaminophen (Tylenol) tab 650 mg  650 mg Oral Q4H PRN    Or    HYDROcodone-acetaminophen (Norco) 5-325 MG per tab 1 tablet  1 tablet Oral Q4H PRN    Or    HYDROcodone-acetaminophen (Norco) 5-325 MG per tab 2 tablet   2 tablet Oral Q4H PRN    morphINE PF 2 MG/ML injection 1 mg  1 mg Intravenous Q2H PRN    Or    morphINE PF 2 MG/ML injection 2 mg  2 mg Intravenous Q2H PRN    Or    morphINE PF 4 MG/ML injection 4 mg  4 mg Intravenous Q2H PRN    polyethylene glycol (PEG 3350) (Miralax) 17 g oral packet 17 g  17 g Oral Daily PRN    sennosides (Senokot) tab 17.2 mg  17.2 mg Oral Nightly PRN    bisacodyl (Dulcolax) 10 MG rectal suppository 10 mg  10 mg Rectal Daily PRN    ondansetron (Zofran) 4 MG/2ML injection 4 mg  4 mg Intravenous Q6H PRN    aspirin chewable tab 81 mg  81 mg Oral Daily    atorvastatin (Lipitor) tab 80 mg  80 mg Oral Nightly    [START ON 7/31/2024] calcitriol (Rocaltrol) cap 0.25 mcg  0.25 mcg Oral QOD    ezetimibe (Zetia) tab 10 mg  10 mg Oral Nightly    midodrine (ProAmatine) tab 5 mg  5 mg Oral TID    [Held by provider] torsemide (Demadex) tab 20 mg  20 mg Oral Daily    tamsulosin (Flomax) cap 0.4 mg  0.4 mg Oral Daily @ 0700    cholecalciferol (Vitamin D3) tab 1,000 Units  1,000 Units Oral Daily    glucose (Dex4) 15 GM/59ML oral liquid 15 g  15 g Oral Q15 Min PRN    Or    glucose (Glutose) 40% oral gel 15 g  15 g Oral Q15 Min PRN    Or    glucose-vitamin C (Dex-4) chewable tab 4 tablet  4 tablet Oral Q15 Min PRN    Or    dextrose 50% injection 50 mL  50 mL Intravenous Q15 Min PRN    Or    glucose (Dex4) 15 GM/59ML oral liquid 30 g  30 g Oral Q15 Min PRN    Or    glucose (Glutose) 40% oral gel 30 g  30 g Oral Q15 Min PRN    Or    glucose-vitamin C (Dex-4) chewable tab 8 tablet  8 tablet Oral Q15 Min PRN    insulin aspart (NovoLOG) 100 Units/mL FlexPen 1-5 Units  1-5 Units Subcutaneous TID AC and HS    heparin (Porcine) 5000 UNIT/ML injection 5,000 Units  5,000 Units Subcutaneous Q8H JEFERSON    clopidogrel (Plavix) tab 75 mg  75 mg Oral Daily    metoclopramide (Reglan) 5 mg/mL injection 5 mg  5 mg Intravenous Q8H PRN       Past Medical History:    Arthritis    Congestive heart disease (HCC)    Diabetes (HCC)     Essential hypertension    High blood pressure    High cholesterol    History of blood clots    Muscle weakness    Renal disorder    Sleep apnea    non compliant with CPAP       Past Surgical History:   Procedure Laterality Date    Nephrectomy N/A     Other surgical history N/A     nephrectomy       Family History  family history is not on file.    Social History   reports that he has never smoked. He has never used smokeless tobacco. He reports that he does not drink alcohol and does not use drugs.     Allergies  Allergies   Allergen Reactions    Losartan UNKNOWN    Lisinopril ITCHING       Review of Systems:  As per HPI, otherwise 10 point ROS is negative in detail.    Physical Exam:  Blood pressure 131/46, pulse (!) 46, temperature 97.5 °F (36.4 °C), temperature source Oral, resp. rate 20, weight 166 lb 0.1 oz (75.3 kg), SpO2 100%.  Temp (24hrs), Av °F (36.7 °C), Min:97.5 °F (36.4 °C), Max:98.7 °F (37.1 °C)    Wt Readings from Last 3 Encounters:   24 166 lb 0.1 oz (75.3 kg)   24 165 lb (74.8 kg)   06/15/24 171 lb 15.3 oz (78 kg)       General: Awake and alert; in no acute distress  HEENT: Extraocular movements are intact; sclerae are anicteric; scalp is atraumatic  Neck: Supple; no JVD; no carotid bruits  Cardiac: Regular rate and regular rhythm; normal S1 and S2, no murmurs, rubs, or gallops are appreciated  Lungs: Clear to auscultation bilaterally; no accessory muscle use is noted, no wheezes, rhonci or rales  Abdomen: Soft, non-distended, non-tender; bowel sounds are normoactive  Extremities: Warm, no edema, clubbing or cyanosis; moves all 4 extremities normally, distal pulses intact and equal  Psychiatric: Normal mood and affect; answers questions appropriately  Dermatologic: No rashes; normal skin turgor    Diagnostic testing:    Labs:   No results found for: \"PT\", \"INR\"     Lab Results   Component Value Date    WBC 7.0 2024    HGB 9.1 2024    HCT 26.1 2024    .0  07/30/2024    CREATSERUM 2.02 07/30/2024    BUN 35 07/30/2024     07/30/2024    K 3.7 07/30/2024     07/30/2024    CO2 26.0 07/30/2024    GLU 85 07/30/2024    CA 9.0 07/30/2024    MG 1.9 07/30/2024    PGLU 128 07/30/2024       Cardiac diagnostics:        Thank you for allowing our practice to participate in the care of your patient. Please do not hesitate to contact me if you have any questions.    Michael Ornelas MD  Interventional Cardiology  [facility]  Office: [phone]

## 2024-07-30 NOTE — CONSULTS
[facility] Cardiology  Consultation Note      Bonifacio Mccoy Patient Status:  Inpatient    1940 MRN [id number]   Location [facility] 8NE-A Attending Eliza Vivar, DO   Hosp Day # 0 PCP Hiram Nugent MD     Reason for consult: CAD    History of Present Illness:  Bonifacio Mccoy is a 83 year old male with multivessel CAD, deemed poor CABG surgery candidate, s/p recent PCI to RCA, who presented to [facility] on 2024 for elective staged PCI of LAD. He presents one day prior to PCI for management of his CKD 4. Since PCI of RCA he has felt improved. Denies CP or SOB. No edema. He did have syncope and was admitted just recently for this. Thought due to dehydration - evidently he may have took an extra dose of diuretics inadvertently.      Medications:  Current Facility-Administered Medications   Medication Dose Route Frequency    sodium chloride 0.9% infusion   Intravenous Continuous    acetaminophen (Tylenol Extra Strength) tab 500 mg  500 mg Oral Q4H PRN    acetaminophen (Tylenol) tab 650 mg  650 mg Oral Q4H PRN    Or    HYDROcodone-acetaminophen (Norco) 5-325 MG per tab 1 tablet  1 tablet Oral Q4H PRN    Or    HYDROcodone-acetaminophen (Norco) 5-325 MG per tab 2 tablet  2 tablet Oral Q4H PRN    morphINE PF 2 MG/ML injection 1 mg  1 mg Intravenous Q2H PRN    Or    morphINE PF 2 MG/ML injection 2 mg  2 mg Intravenous Q2H PRN    Or    morphINE PF 4 MG/ML injection 4 mg  4 mg Intravenous Q2H PRN    polyethylene glycol (PEG 3350) (Miralax) 17 g oral packet 17 g  17 g Oral Daily PRN    sennosides (Senokot) tab 17.2 mg  17.2 mg Oral Nightly PRN    bisacodyl (Dulcolax) 10 MG rectal suppository 10 mg  10 mg Rectal Daily PRN    ondansetron (Zofran) 4 MG/2ML injection 4 mg  4 mg Intravenous Q6H PRN    [START ON 2024] aspirin chewable tab 81 mg  81 mg Oral Daily    atorvastatin (Lipitor) tab 80 mg  80 mg Oral Nightly    [START ON 2024] calcitriol (Rocaltrol) cap 0.25 mcg  0.25  mcg Oral QOD    ezetimibe (Zetia) tab 10 mg  10 mg Oral Nightly    midodrine (ProAmatine) tab 5 mg  5 mg Oral TID    [Held by provider] torsemide (Demadex) tab 20 mg  20 mg Oral Daily    [START ON 7/30/2024] tamsulosin (Flomax) cap 0.4 mg  0.4 mg Oral Daily @ 0700    [START ON 7/30/2024] cholecalciferol (Vitamin D3) tab 1,000 Units  1,000 Units Oral Daily    glucose (Dex4) 15 GM/59ML oral liquid 15 g  15 g Oral Q15 Min PRN    Or    glucose (Glutose) 40% oral gel 15 g  15 g Oral Q15 Min PRN    Or    glucose-vitamin C (Dex-4) chewable tab 4 tablet  4 tablet Oral Q15 Min PRN    Or    dextrose 50% injection 50 mL  50 mL Intravenous Q15 Min PRN    Or    glucose (Dex4) 15 GM/59ML oral liquid 30 g  30 g Oral Q15 Min PRN    Or    glucose (Glutose) 40% oral gel 30 g  30 g Oral Q15 Min PRN    Or    glucose-vitamin C (Dex-4) chewable tab 8 tablet  8 tablet Oral Q15 Min PRN    insulin aspart (NovoLOG) 100 Units/mL FlexPen 1-5 Units  1-5 Units Subcutaneous TID AC and HS    heparin (Porcine) 5000 UNIT/ML injection 5,000 Units  5,000 Units Subcutaneous Q8H JEFERSON    clopidogrel (Plavix) tab 75 mg  75 mg Oral Daily    metoclopramide (Reglan) 5 mg/mL injection 5 mg  5 mg Intravenous Q8H PRN       Past Medical History:    Arthritis    Congestive heart disease (HCC)    Diabetes (HCC)    Essential hypertension    High blood pressure    High cholesterol    History of blood clots    Muscle weakness    Renal disorder    Sleep apnea    non compliant with CPAP       Past Surgical History:   Procedure Laterality Date    Nephrectomy N/A     Other surgical history N/A     nephrectomy       Family History  family history is not on file.    Social History   reports that he has never smoked. He has never used smokeless tobacco. He reports that he does not drink alcohol and does not use drugs.     Allergies  Allergies   Allergen Reactions    Losartan UNKNOWN    Lisinopril ITCHING       Review of Systems:  As per HPI, otherwise 10 point ROS is  negative in detail.    Physical Exam:  Blood pressure 136/53, pulse (!) 44, temperature 98.7 °F (37.1 °C), temperature source Oral, resp. rate 18, weight 166 lb 0.1 oz (75.3 kg), SpO2 100%.  Temp (24hrs), Av °F (36.7 °C), Min:97.6 °F (36.4 °C), Max:98.7 °F (37.1 °C)    Wt Readings from Last 3 Encounters:   24 166 lb 0.1 oz (75.3 kg)   24 165 lb (74.8 kg)   06/15/24 171 lb 15.3 oz (78 kg)       General: Awake and alert; in no acute distress  HEENT: Extraocular movements are intact; sclerae are anicteric; scalp is atraumatic  Neck: Supple; no JVD; no carotid bruits  Cardiac: Regular rate and regular rhythm; normal S1 and S2, no murmurs, rubs, or gallops are appreciated  Lungs: Clear to auscultation bilaterally; no accessory muscle use is noted, no wheezes, rhonci or rales  Abdomen: Soft, non-distended, non-tender; bowel sounds are normoactive  Extremities: Warm, no edema, clubbing or cyanosis; moves all 4 extremities normally, distal pulses intact and equal  Psychiatric: Normal mood and affect; answers questions appropriately  Dermatologic: No rashes; normal skin turgor    Diagnostic testing:    Labs:   No results found for: \"PT\", \"INR\"     Lab Results   Component Value Date    WBC 6.6 2024    HGB 9.0 2024    HCT 25.9 2024    .0 2024    CREATSERUM 2.16 2024    BUN 36 2024     2024    K 3.8 2024     2024    CO2 26.0 2024     2024    CA 8.9 2024    PGLU 195 2024       Cardiac diagnostics:        Impression:  CAD - multivessel, deemed poor surgical candidate. S/p PCI RCA, now here for staged PCI LAD  Chest burning - resolved post PCI  Chronic edema - on torsemide, per renal  CKD 4 - nephrectomy, b/l Cr ~ 2.5  HL   HTN  DM2   Syncope - dehydration, after inadvertently taking a second dose of diuretic  Orthostatic hypotension    Recommendations:  Neeru procedural IVF, torsemide held, per renal   PCI LAD  tomorrow, will limit contrast use  ASA, statin, plavix, Zetia,   Midodrine    Informed consent:  Patient seen and examined independently. H and P reviewed. No changes in H and P. Risks and benefits of procedure were discussed with patient. Airway examined.  Patient is ASA class 2 and Mallampati class 2. Pt is appropriate for conscious sedation. No history of difficult airway. The risks, benefits, indications and alternatives of left heart catheterization and coronary angigoraphy with possible percutaneous coronary intervention were discussed. The risks include, but are not limited to: bleeding, anemia requiring blood transfusion, allergic reaction, hypotension, infection, vascular injury, injury to upper or lower extremity requiring endovascular or open surgical repair,  kidney failure, stroke, cardiac or pulmonary artery perforation, pericardial effusion and tamponade requiring pericardiocentesis, myocardial infarction (heart attack), respiratory failure needing intubation, cardiac arrest, need for emergent surgery, and death. Benefits of the procedure include: symptomatic improvement, diagnosis of coronary artery disease or other forms of heart disease and possibly prevention of myocardial infarction. Alternatives to the procedure include: not performing cardiac catheterization, treatment with medications only, and observation. The patient and any accompanying family have considered the above, all questions have been answered to the best of my ability to their satisfaction, and have decided to proceed with the procedure. Appropriate candidate for Sedation/Analgesia: Yes. Plan for Sedation reviewed: Yes. Explained Anesthesia options and attendant risks, and have determined patient is an appropriate candidate. Yes. Consent for Sedation obtained: Yes. Patient reevaluated immediately prior to Sedation/Analgesia: Yes.       Thank you for allowing our practice to participate in the care of your patient. Please do not  hesitate to contact me if you have any questions.    Michael Ornelas MD  Interventional Cardiology  [facility]  Office: [phone]    7/29/2024  11:03 PM    Total encounter time 75 minutes.

## 2024-07-31 VITALS
BODY MASS INDEX: 28 KG/M2 | DIASTOLIC BLOOD PRESSURE: 54 MMHG | WEIGHT: 166 LBS | TEMPERATURE: 98 F | HEART RATE: 49 BPM | RESPIRATION RATE: 18 BRPM | OXYGEN SATURATION: 100 % | SYSTOLIC BLOOD PRESSURE: 145 MMHG

## 2024-07-31 LAB
ANION GAP SERPL CALC-SCNC: 5 MMOL/L (ref 0–18)
BUN BLD-MCNC: 27 MG/DL (ref 9–23)
CALCIUM BLD-MCNC: 8.9 MG/DL (ref 8.7–10.4)
CHLORIDE SERPL-SCNC: 108 MMOL/L (ref 98–112)
CO2 SERPL-SCNC: 24 MMOL/L (ref 21–32)
CREAT BLD-MCNC: 1.75 MG/DL
EGFRCR SERPLBLD CKD-EPI 2021: 39 ML/MIN/1.73M2 (ref 60–?)
ERYTHROCYTE [DISTWIDTH] IN BLOOD BY AUTOMATED COUNT: 13.2 %
GLUCOSE BLD-MCNC: 118 MG/DL (ref 70–99)
GLUCOSE BLD-MCNC: 125 MG/DL (ref 70–99)
GLUCOSE BLD-MCNC: 128 MG/DL (ref 70–99)
HCT VFR BLD AUTO: 26.5 %
HGB BLD-MCNC: 9.2 G/DL
MAGNESIUM SERPL-MCNC: 2 MG/DL (ref 1.6–2.6)
MCH RBC QN AUTO: 31.9 PG (ref 26–34)
MCHC RBC AUTO-ENTMCNC: 34.7 G/DL (ref 31–37)
MCV RBC AUTO: 92 FL
OSMOLALITY SERPL CALC.SUM OF ELEC: 290 MOSM/KG (ref 275–295)
PLATELET # BLD AUTO: 143 10(3)UL (ref 150–450)
POTASSIUM SERPL-SCNC: 3.5 MMOL/L (ref 3.5–5.1)
RBC # BLD AUTO: 2.88 X10(6)UL
SODIUM SERPL-SCNC: 137 MMOL/L (ref 136–145)
WBC # BLD AUTO: 6.8 X10(3) UL (ref 4–11)

## 2024-07-31 PROCEDURE — 99232 SBSQ HOSP IP/OBS MODERATE 35: CPT | Performed by: INTERNAL MEDICINE

## 2024-07-31 NOTE — PROGRESS NOTES
[facility]  Progress Note    Bonifacio Mccoy Patient Status:  Inpatient    1940 MRN [id number]   [facility] 8NE-A Attending Eliza Vivar,    Hosp Day # 2 PCP Hiram Nugent MD        No acute events  S/p LHC/PCI yesterday      Current Facility-Administered Medications:     iodixanol (VISIPAQUE) 320 MG/ML injection 100 mL, 100 mL, Injection, ONCE PRN    sodium chloride 0.9% infusion, , Intravenous, Continuous    acetaminophen (Tylenol Extra Strength) tab 500 mg, 500 mg, Oral, Q4H PRN    acetaminophen (Tylenol) tab 650 mg, 650 mg, Oral, Q4H PRN **OR** HYDROcodone-acetaminophen (Norco) 5-325 MG per tab 1 tablet, 1 tablet, Oral, Q4H PRN **OR** HYDROcodone-acetaminophen (Norco) 5-325 MG per tab 2 tablet, 2 tablet, Oral, Q4H PRN    morphINE PF 2 MG/ML injection 1 mg, 1 mg, Intravenous, Q2H PRN **OR** morphINE PF 2 MG/ML injection 2 mg, 2 mg, Intravenous, Q2H PRN **OR** morphINE PF 4 MG/ML injection 4 mg, 4 mg, Intravenous, Q2H PRN    polyethylene glycol (PEG 3350) (Miralax) 17 g oral packet 17 g, 17 g, Oral, Daily PRN    sennosides (Senokot) tab 17.2 mg, 17.2 mg, Oral, Nightly PRN    bisacodyl (Dulcolax) 10 MG rectal suppository 10 mg, 10 mg, Rectal, Daily PRN    ondansetron (Zofran) 4 MG/2ML injection 4 mg, 4 mg, Intravenous, Q6H PRN    aspirin chewable tab 81 mg, 81 mg, Oral, Daily    atorvastatin (Lipitor) tab 80 mg, 80 mg, Oral, Nightly    calcitriol (Rocaltrol) cap 0.25 mcg, 0.25 mcg, Oral, QOD    ezetimibe (Zetia) tab 10 mg, 10 mg, Oral, Nightly    midodrine (ProAmatine) tab 5 mg, 5 mg, Oral, TID    [Held by provider] torsemide (Demadex) tab 20 mg, 20 mg, Oral, Daily    tamsulosin (Flomax) cap 0.4 mg, 0.4 mg, Oral, Daily @ 0700    cholecalciferol (Vitamin D3) tab 1,000 Units, 1,000 Units, Oral, Daily    glucose (Dex4) 15 GM/59ML oral liquid 15 g, 15 g, Oral, Q15 Min PRN **OR** glucose (Glutose) 40% oral gel 15 g, 15 g, Oral, Q15 Min PRN **OR** glucose-vitamin C (Dex-4) chewable  tab 4 tablet, 4 tablet, Oral, Q15 Min PRN **OR** dextrose 50% injection 50 mL, 50 mL, Intravenous, Q15 Min PRN **OR** glucose (Dex4) 15 GM/59ML oral liquid 30 g, 30 g, Oral, Q15 Min PRN **OR** glucose (Glutose) 40% oral gel 30 g, 30 g, Oral, Q15 Min PRN **OR** glucose-vitamin C (Dex-4) chewable tab 8 tablet, 8 tablet, Oral, Q15 Min PRN    insulin aspart (NovoLOG) 100 Units/mL FlexPen 1-5 Units, 1-5 Units, Subcutaneous, TID AC and HS    heparin (Porcine) 5000 UNIT/ML injection 5,000 Units, 5,000 Units, Subcutaneous, Q8H JEFERSON    clopidogrel (Plavix) tab 75 mg, 75 mg, Oral, Daily    metoclopramide (Reglan) 5 mg/mL injection 5 mg, 5 mg, Intravenous, Q8H PRN     Physical Exam:  Vital signs: Blood pressure 135/77, pulse (!) 48, temperature 97.8 °F (36.6 °C), temperature source Oral, resp. rate 18, weight 166 lb 0.1 oz (75.3 kg), SpO2 99%.  General: No acute distress. Alert and oriented x 3.  HEENT: Moist mucous membranes. EOM-I. PERRL  Neck: No lymphadenopathy.  No JVD. No carotid bruits.  Respiratory: Clear to auscultation bilaterally.  No wheezes. No rhonchi.  Cardiovascular: S1, S2.  Regular rate and rhythm.  No murmurs. Equal pulses   Abdomen: Soft, nontender, nondistended.  Positive bowel sounds. No rebound tenderness   Neurologic: No focal neurological deficits.   Musculoskeletal: Full range of motion of all extremities.  No swelling noted.   Integument: No lesions. No erythema.  Psychiatric: Appropriate mood and affect.    Recent Labs     07/29/24  1313 07/29/24  2330 07/30/24  0534 07/31/24  0517   WBC 6.6 6.7 7.0 6.8   HGB 9.0* 8.9* 9.1* 9.2*   MCV 91.8 90.1 91.3 92.0   .0 135.0* 159.0 143.0*       Recent Labs     07/29/24  1313 07/29/24  2330 07/30/24  0534 07/31/24  0517   * 134* 136 137   K 3.8 3.7 3.7 3.5    102 105 108   CO2 26.0 26.0 26.0 24.0   BUN 36* 40* 35* 27*   CREATSERUM 2.16* 2.08* 2.02* 1.75*   CA 8.9 8.8 9.0 8.9   MG  --   --  1.9 2.0       No results for input(s): \"ALT\", \"AST\",  \"ALB\", \"AMYLASE\", \"LIPASE\", \"LDH\" in the last 72 hours.    Invalid input(s): \"ALPHOS\", \"TBIL\", \"DBIL\", \"TPROT\"          Imaging:  Revieed     Impression:  CKD- stg 3/4; follows @ [facility]; pt has hx of DKD/HTN + s/p nephrectomy in past.  Has been monitored closely for CKD status  - no evidence of ROCK; can resume diuretic on dc  DM  CAD- s/p LHC/PCI; doing well; per cardiology  HTN- stable; monitor     Thank you for allowing me to participate in this patient's care.  Please feel free to call me with any questions or concerns.    Kelvin Ramsey MD  7/31/2024

## 2024-07-31 NOTE — DISCHARGE SUMMARY
General Medicine Discharge Summary     Patient ID:  Bonifacio Mccoy  83 year old  9/30/1940    Admit date: 7/29/2024    Discharge date and time:07/31/24      Attending Physician: Eliza Vivar DO     Consults: IP CONSULT TO CARDIOLOGY  IP CONSULT TO NEPHROLOGY  IP CONSULT TO SOCIAL WORK  IP CONSULT TO SOCIAL WORK  IP CONSULT TO SOCIAL WORK    Primary Care Physician: iHram Nugent MD     Reason for admission: CAD, staged PCI LAD      Discharge Diagnoses: CAD  CAD (coronary artery disease)  See Additional Discharge Diagnoses in Hospital Course    Discharged Condition: stable    Exam  Gen: No acute distress  Pulm: Lungs clear, normal respiratory effort  CV: Heart with regular rate and rhythm  Abd: Abdomen soft,       HPI:   Patient is a 83 year old male with PMH sig for CAD sp pci, ckd 3, chronic diastolic chf, htn, hld, DM II who presents for scheduled procedure. Pt has plans for PCI of LAD with cardiology.     Hospital Course:     CAD s/p prior PCI RCA, sp PCI LAD 7/30   HFpEF  Hld  - s/p staged PCI LAD 7/20   - cont asa, plavix, statin, zetia  - no longer on coreg due to hx bradycardia  - holding pta torsemide, per cards resume on discharge   - on midodrine    Sinus bradycardia  - has had bradycardia in the past as well, coreg was stopped during previous admission  - hr in 40s-50s, pt asymptomatic  - per cards      Ckd 4  - cr better than previous baseline   - ivf per nephrology     Bph  - flomax      DM II  Resume home meds on dc      Chronic anemia  - close to recent baseline  - op fu         Operative Procedures:      Imaging: No results found.      Disposition: home         Med list     Medication List        CONTINUE taking these medications      aspirin 81 MG Chew  Chew 1 tablet (81 mg total) by mouth daily.     atorvastatin 80 MG Tabs  Commonly known as: Lipitor     calcitriol 0.25 MCG Caps  Commonly known as: Rocaltrol     cholecalciferol 25 MCG (1000 UT) Tabs  Commonly known as: Vitamin D3      clopidogrel 75 MG Tabs  Commonly known as: Plavix     ezetimibe 10 MG Tabs  Commonly known as: Zetia     insulin glargine 100 UNIT/ML Soln  Commonly known as: Lantus     midodrine 5 MG Tabs  Commonly known as: ProAmatine     tamsulosin 0.4 MG Caps  Commonly known as: Flomax     torsemide 20 MG Tabs  Commonly known as: Demadex              FU   Follow-up Information       Hiram Nugent MD. Schedule an appointment as soon as possible for a visit in 1 week(s).    Specialties: Family Medicine, IP Consult to Primary Care  Contact information:  [address]  [address]  [facility] [address] [phone]               Michael Ornelas MD. Schedule an appointment as soon as possible for a visit in 1 week(s).    Specialty: CARDIOLOGY  Contact information:  [address]  [facility] [address] [phone]               Zain Holt MD. Schedule an appointment as soon as possible for a visit in 1 week(s).    Specialty: NEPHROLOGY  Contact information:  [address]  [address]  [place] [address] [phone]                             DC instructions:      Other Discharge Instructions:         Resume [facility] at discharge          Total Time Coordinating Care: greater  than 30 minutes    Patient had opportunity to ask questions and state understand and agree with therapeutic plan as outlined    Thank You,    DO SUZETTE Xavier Hospitalist

## 2024-07-31 NOTE — PLAN OF CARE
Received patient at 0730. Alert and Oriented x4. Tele Rhythm SB. Pt on RA. Breath sounds clear/diminished. Bed is locked and in low position. Call light and personal items within reach. No C/O chest pain or shortness of breath. Pt voiding with no issue. Pt ambulates with SBA and walker. R wrist CDI. Soft no hematoma. Reviewed plan of care and patient verbalizes understanding.     Plan:  Mds to see    Problem: Patient/Family Goals  Goal: Patient/Family Long Term Goal  Description: Patient's Long Term Goal: to go home    Interventions:  - consults to see  -C   - See additional Care Plan goals for specific interventions  Outcome: Progressing  Goal: Patient/Family Short Term Goal  Description: Patient's Short Term Goal: to feel better    Interventions:   - [facility]  -meds as ordered  -IV fluids  - See additional Care Plan goals for specific interventions  Outcome: Progressing     Problem: CARDIOVASCULAR - ADULT  Goal: Maintains optimal cardiac output and hemodynamic stability  Description: INTERVENTIONS:  - Monitor vital signs, rhythm, and trends  - Monitor for bleeding, hypotension and signs of decreased cardiac output  - Evaluate effectiveness of vasoactive medications to optimize hemodynamic stability  - Monitor arterial and/or venous puncture sites for bleeding and/or hematoma  - Assess quality of pulses, skin color and temperature  - Assess for signs of decreased coronary artery perfusion - ex. Angina  - Evaluate fluid balance, assess for edema, trend weights  Outcome: Progressing  Goal: Absence of cardiac arrhythmias or at baseline  Description: INTERVENTIONS:  - Continuous cardiac monitoring, monitor vital signs, obtain 12 lead EKG if indicated  - Evaluate effectiveness of antiarrhythmic and heart rate control medications as ordered  - Initiate emergency measures for life threatening arrhythmias  - Monitor electrolytes and administer replacement therapy as ordered  Outcome: Progressing

## 2024-07-31 NOTE — CARDIAC REHAB
Cardiac rehab education completed with patient an daughter.  Stent card given to daughter.  Patient referred to cardia rehab.  Family will call when ready to schedule appointment.

## 2024-07-31 NOTE — PROGRESS NOTES
Assumed care at 0930. Pt is AO x4. Primarily Nepali speaking. Family at beside to translate. SR to SB on tele monitor. O2 saturations WNL on room air. No complaints of chest pain or SOB. Plan for discharge this afternoon. Pt and family updated on POC. Verbalized understanding. Call light with reach.

## 2024-07-31 NOTE — PROGRESS NOTES
[facility] Cardiology  Consultation Note      Bonifacio Mccoy Patient Status:  Inpatient    1940 MRN [id number]   Location [facility] 8NE-A Attending Eliza Vivar,    Hosp Day # 2 PCP Hiram Nugent MD     Reason for consult: CAD    Subjective: Feels well. S/p PCI LAD yesterday. Renal function stable. No CP, SOB, edema.    Impression:  CAD - multivessel, deemed poor surgical candidate. S/p PCI RCA, now s/p staged PCI LAD  Chest burning - resolved post PCI  Chronic edema - on torsemide, per renal  CKD 4 - nephrectomy, b/l Cr ~ 2.5  HL   HTN  DM2   Syncope - dehydration, after inadvertently taking a second dose of diuretic  Orthostatic hypotension    Recommendations:  Only 10 ml contrast used for PCI. He has received adequate alysa procedural IVF. LVEDP 15mmHg. Can resume torsemide at dc.  Continue ASA, plavix, statin, Zetia,   Midodrine  Ok for dc home. Advised to f/u with our NP for 1 week check.     History of Present Illness:  Bonifacio Mccoy is a 83 year old male with multivessel CAD, deemed poor CABG surgery candidate, s/p recent PCI to RCA, who presented to [facility] on 2024 for elective staged PCI of LAD. He presents one day prior to PCI for management of his CKD 4. Since PCI of RCA he has felt improved. Denies CP or SOB. No edema. He did have syncope and was admitted just recently for this. Thought due to dehydration - evidently he may have took an extra dose of diuretics inadvertently.      Medications:  Current Facility-Administered Medications   Medication Dose Route Frequency    iodixanol (VISIPAQUE) 320 MG/ML injection 100 mL  100 mL Injection ONCE PRN    sodium chloride 0.9% infusion   Intravenous Continuous    acetaminophen (Tylenol Extra Strength) tab 500 mg  500 mg Oral Q4H PRN    acetaminophen (Tylenol) tab 650 mg  650 mg Oral Q4H PRN    Or    HYDROcodone-acetaminophen (Norco) 5-325 MG per tab 1 tablet  1 tablet Oral Q4H PRN    Or     HYDROcodone-acetaminophen (Norco) 5-325 MG per tab 2 tablet  2 tablet Oral Q4H PRN    morphINE PF 2 MG/ML injection 1 mg  1 mg Intravenous Q2H PRN    Or    morphINE PF 2 MG/ML injection 2 mg  2 mg Intravenous Q2H PRN    Or    morphINE PF 4 MG/ML injection 4 mg  4 mg Intravenous Q2H PRN    polyethylene glycol (PEG 3350) (Miralax) 17 g oral packet 17 g  17 g Oral Daily PRN    sennosides (Senokot) tab 17.2 mg  17.2 mg Oral Nightly PRN    bisacodyl (Dulcolax) 10 MG rectal suppository 10 mg  10 mg Rectal Daily PRN    ondansetron (Zofran) 4 MG/2ML injection 4 mg  4 mg Intravenous Q6H PRN    aspirin chewable tab 81 mg  81 mg Oral Daily    atorvastatin (Lipitor) tab 80 mg  80 mg Oral Nightly    calcitriol (Rocaltrol) cap 0.25 mcg  0.25 mcg Oral QOD    ezetimibe (Zetia) tab 10 mg  10 mg Oral Nightly    midodrine (ProAmatine) tab 5 mg  5 mg Oral TID    [Held by provider] torsemide (Demadex) tab 20 mg  20 mg Oral Daily    tamsulosin (Flomax) cap 0.4 mg  0.4 mg Oral Daily @ 0700    cholecalciferol (Vitamin D3) tab 1,000 Units  1,000 Units Oral Daily    glucose (Dex4) 15 GM/59ML oral liquid 15 g  15 g Oral Q15 Min PRN    Or    glucose (Glutose) 40% oral gel 15 g  15 g Oral Q15 Min PRN    Or    glucose-vitamin C (Dex-4) chewable tab 4 tablet  4 tablet Oral Q15 Min PRN    Or    dextrose 50% injection 50 mL  50 mL Intravenous Q15 Min PRN    Or    glucose (Dex4) 15 GM/59ML oral liquid 30 g  30 g Oral Q15 Min PRN    Or    glucose (Glutose) 40% oral gel 30 g  30 g Oral Q15 Min PRN    Or    glucose-vitamin C (Dex-4) chewable tab 8 tablet  8 tablet Oral Q15 Min PRN    insulin aspart (NovoLOG) 100 Units/mL FlexPen 1-5 Units  1-5 Units Subcutaneous TID AC and HS    heparin (Porcine) 5000 UNIT/ML injection 5,000 Units  5,000 Units Subcutaneous Q8H JEFERSON    clopidogrel (Plavix) tab 75 mg  75 mg Oral Daily    metoclopramide (Reglan) 5 mg/mL injection 5 mg  5 mg Intravenous Q8H PRN       Past Medical History:    Arthritis    Congestive heart  disease (HCC)    Diabetes (HCC)    Essential hypertension    High blood pressure    High cholesterol    History of blood clots    Muscle weakness    Renal disorder    Sleep apnea    non compliant with CPAP       Past Surgical History:   Procedure Laterality Date    Nephrectomy N/A     Other surgical history N/A     nephrectomy       Family History  family history is not on file.    Social History   reports that he has never smoked. He has never used smokeless tobacco. He reports that he does not drink alcohol and does not use drugs.     Allergies  Allergies   Allergen Reactions    Losartan UNKNOWN    Lisinopril ITCHING       Review of Systems:  As per HPI, otherwise 10 point ROS is negative in detail.    Physical Exam:  Blood pressure 145/54, pulse (!) 49, temperature 98 °F (36.7 °C), temperature source Oral, resp. rate 18, weight 166 lb 0.1 oz (75.3 kg), SpO2 100%.  Temp (24hrs), Av.9 °F (36.6 °C), Min:97.6 °F (36.4 °C), Max:98.2 °F (36.8 °C)    Wt Readings from Last 3 Encounters:   24 166 lb 0.1 oz (75.3 kg)   24 165 lb (74.8 kg)   06/15/24 171 lb 15.3 oz (78 kg)       General: Awake and alert; in no acute distress  HEENT: Extraocular movements are intact; sclerae are anicteric; scalp is atraumatic  Neck: Supple; no JVD; no carotid bruits  Cardiac: Regular rate and regular rhythm; normal S1 and S2, no murmurs, rubs, or gallops are appreciated  Lungs: Clear to auscultation bilaterally; no accessory muscle use is noted, no wheezes, rhonci or rales  Abdomen: Soft, non-distended, non-tender; bowel sounds are normoactive  Extremities: Warm, no edema, clubbing or cyanosis; moves all 4 extremities normally, distal pulses intact and equal  Psychiatric: Normal mood and affect; answers questions appropriately  Dermatologic: No rashes; normal skin turgor  R radial 1+ pulse, no hematoma    Diagnostic testing:    Labs:   No results found for: \"PT\", \"INR\"     Lab Results   Component Value Date    WBC 6.8  07/31/2024    HGB 9.2 07/31/2024    HCT 26.5 07/31/2024    .0 07/31/2024    CREATSERUM 1.75 07/31/2024    BUN 27 07/31/2024     07/31/2024    K 3.5 07/31/2024     07/31/2024    CO2 24.0 07/31/2024     07/31/2024    CA 8.9 07/31/2024    MG 2.0 07/31/2024    PGLU 128 07/31/2024       Cardiac diagnostics:    Telemetry: Predominantly sinus rhythm, no malignant arrhythmias       Thank you for allowing our practice to participate in the care of your patient. Please do not hesitate to contact me if you have any questions.    Michael Ornelas MD  Interventional Cardiology  [facility]  Office: [phone]

## 2024-07-31 NOTE — CM/SW NOTE
Received order to \"follow up on HH.\" Pt is current w/ [facility] and updates/orders have been sent to agency. Await discharge clearance.     Ewa Sifuentes, MSW, LSW  Discharge Planner

## 2024-07-31 NOTE — CM/SW NOTE
07/31/24 1500   Discharge disposition   Expected discharge disposition Home-Health   Post Acute Care Provider   ([facility])   Discharge transportation Private car       AVS sent to [facility] in [facility].

## 2024-07-31 NOTE — PROGRESS NOTES
IV removed with catheter intact. Discharge instructions reviewed with pt and family. All verbalized understanding. All questions answered. Pt transported to [facility] for discharge home by PCT.

## 2024-07-31 NOTE — PLAN OF CARE
A&Ox4. Setswana speaking. Family at bedside. O2 sat WNL on RA. Cpap at night. SB on tele. HR in the 40s while awake. R wrist TR band- oozing. TR band refilled with air. Site soft/no hematoma. Pulse strong. Some bruising around site. R index and middle finger numb- baseline. Otherwise normal sensation. Continent of bladder and bowel. Primofit in place. Urinating without difficulty.  IVF stopped at 2300. Up x1 and walker.       Problem: Patient/Family Goals  Goal: Patient/Family Long Term Goal  Description: Patient's Long Term Goal: to go home    Interventions:  - consults to see  -[facility]   - See additional Care Plan goals for specific interventions  Outcome: Progressing  Goal: Patient/Family Short Term Goal  Description: Patient's Short Term Goal: to feel better    Interventions:   - [facility]  -meds as ordered  -IV fluids  - See additional Care Plan goals for specific interventions  Outcome: Progressing     Problem: CARDIOVASCULAR - ADULT  Goal: Maintains optimal cardiac output and hemodynamic stability  Description: INTERVENTIONS:  - Monitor vital signs, rhythm, and trends  - Monitor for bleeding, hypotension and signs of decreased cardiac output  - Evaluate effectiveness of vasoactive medications to optimize hemodynamic stability  - Monitor arterial and/or venous puncture sites for bleeding and/or hematoma  - Assess quality of pulses, skin color and temperature  - Assess for signs of decreased coronary artery perfusion - ex. Angina  - Evaluate fluid balance, assess for edema, trend weights  Outcome: Progressing  Goal: Absence of cardiac arrhythmias or at baseline  Description: INTERVENTIONS:  - Continuous cardiac monitoring, monitor vital signs, obtain 12 lead EKG if indicated  - Evaluate effectiveness of antiarrhythmic and heart rate control medications as ordered  - Initiate emergency measures for life threatening arrhythmias  - Monitor electrolytes and administer replacement therapy as ordered  Outcome: Progressing

## 2024-08-01 ENCOUNTER — PATIENT OUTREACH (OUTPATIENT)
Dept: CASE MANAGEMENT | Age: 84
End: 2024-08-01

## 2024-08-01 NOTE — PROGRESS NOTES
[facility] Cardio apt request (discharged 07/31)    Dr Michael Ornelas  CARDIOLOGY  [address]  [address]  [facility] [address]  [phone]  Follow up 1 week  LVM to call [phone] to assist w/scheduling apt

## 2024-08-02 NOTE — PROGRESS NOTES
[facility] Cardio apt request (discharged 07/31)     Dr Michael Ornelas  CARDIOLOGY  [address]  [address]  [facility] [address]  [phone]  Follow up 1 week  Apt made:  Fri 08/16 @3:30pm w/[facility] w/apt information; if still need assistance to call [phone]   Closing encounter

## 2024-08-27 ENCOUNTER — ORDER TRANSCRIPTION (OUTPATIENT)
Dept: CARDIAC REHAB | Facility: HOSPITAL | Age: 84
End: 2024-08-27

## 2024-08-27 DIAGNOSIS — Z98.61 S/P PTCA (PERCUTANEOUS TRANSLUMINAL CORONARY ANGIOPLASTY): Primary | ICD-10-CM

## 2024-09-03 ENCOUNTER — APPOINTMENT (OUTPATIENT)
Dept: CARDIAC REHAB | Facility: HOSPITAL | Age: 84
End: 2024-09-03
Attending: INTERNAL MEDICINE
Payer: MEDICARE

## (undated) NOTE — ED AVS SNAPSHOT
Zulay Nunez   MRN: [id number]    Department:  [facility] Emergency Department   Date of Visit:  7/6/2019           Disclosure     Insurance plans vary and the physician(s) referred by the ER may not be covered by your plan.  Please contact your tell this physician (or your personal doctor if your instructions are to return to your personal doctor) about any new or lasting problems. The primary care or specialist physician will see patients referred from the [facility] Emergency Department.  Arthor Bernheim

## (undated) NOTE — LETTER
[address]  [address]  Consent for Procedure/Sedation  Date: 4/23/2024         Time:     I hereby authorize Dr. Michael Onrelas, my physician and his/her assistants (if applicable), which may include medical students, residents, and/or fellows, to perform the following surgical operation/ procedure and administer such anesthesia as may be determined necessary by my physician:  Operation/Procedure name (s)  Cardiac Catheterization, Left Ventricular Cineangiography, Bilateral Selective Coronary Angiography and/or Right Heart Catheterization; possible Percutaneous Transluminal Coronary Angioplasty, Coronary Atherectomy, Coronary Stent, Intracoronary Thrombolytic therapy, Antiplatelet therapy and/or Intravascular Ultrasound on Bonifacio Mccoy   2.   I recognize that during the surgical operation/procedure, unforeseen conditions may necessitate additional or different procedures than those listed above.  I, therefore, further authorize and request that the above-named surgeon, assistants, or designees perform such procedures as are, in their judgment, necessary and desirable.    3.   My surgeon/physician has discussed prior to my surgery the potential benefits, risks and side effects of this procedure; the likelihood of achieving goals; and potential problems that might occur during recuperation.  They also discussed reasonable alternatives to the procedure, including risks, benefits, and side effects related to the alternatives and risks related to not receiving this procedure.  I have had all my questions answered and I acknowledge that no guarantee has been made as to the result that may be obtained.    4.   Should the need arise during my operation/procedure, which includes change of level of care prior to discharge, I also consent to the administration of blood and/or blood products.  Further, I understand that despite careful testing and screening of blood or blood products by  collecting agencies, I may still be subject to ill effects as a result of receiving a blood transfusion and/or blood products.  The following are some, but not all, of the potential risks that can occur: fever and allergic reactions, hemolytic reactions, transmission of diseases such as Hepatitis, AIDS and Cytomegalovirus (CMV) and fluid overload.  In the event that I wish to have an autologous transfusion of my own blood, or a directed donor transfusion, I will discuss this with my physician.  Check only if Refusing Blood or Blood Products  I understand refusal of blood or blood products as deemed necessary by my physician may have serious consequences to my condition to include possible death. I hereby assume responsibility for my refusal and release the hospital, its personnel, and my physicians from any responsibility for the consequences of my refusal.          o  Refuse      5.   I authorize the use of any specimen, organs, tissues, body parts or foreign objects that may be removed from my body during the operation/procedure for diagnosis, research or teaching purposes and their subsequent disposal by hospital authorities.  I also authorize the release of specimen test results and/or written reports to my treating physician on the hospital medical staff or other referring or consulting physicians involved in my care, at the discretion of the Pathologist or my treating physician.    6.   I consent to the photographing or videotaping of the operations or procedures to be performed, including appropriate portions of my body for medical, scientific, or educational purposes, provided my identity is not revealed by the pictures or by descriptive texts accompanying them.  If the procedure has been photographed/videotaped, the surgeon will obtain the original picture, image, videotape or CD.  The hospital will not be responsible for storage, release or maintenance of the picture, image, tape or CD.    7.   I consent  to the presence of a  or observers in the operating room as deemed necessary by my physician or their designees.    8.   I recognize that in the event my procedure results in extended X-Ray/fluoroscopy time, I may develop a skin reaction.    9. If I have a Do Not Attempt Resuscitation (DNAR) order in place, that status will be suspended while in the operating room, procedural suite, and during the recovery period unless otherwise explicitly stated by me (or a person authorized to consent on my behalf). The surgeon or my attending physician will determine when the applicable recovery period ends for purposes of reinstating the DNAR order.  10. Patients having a sterilization procedure: I understand that if the procedure is successful the results will be permanent and it will therefore be impossible for me to inseminate, conceive, or bear children.  I also understand that the procedure is intended to result in sterility, although the result has not been guaranteed.   11. I acknowledge that my physician has explained sedation/analgesia administration to me including the risk and benefits I consent to the administration of sedation/analgesia as may be necessary or desirable in the judgment of my physician.    I CERTIFY THAT I HAVE READ AND FULLY UNDERSTAND THE ABOVE CONSENT TO OPERATION and/or OTHER PROCEDURE.      ____________________________________       _________________________________      ______________________________  Signature of Patient         Signature of Responsible Person        Printed Name of Responsible Person   ____________________________________      _________________________________      ______________________________       Signature of Witness          Relationship to Patient                       Date                                       Time  Patient Name: Bonifacio Mccoy  : 1940    Reviewed: 2024   Printed: 2024  Medical Record #: [id number] Page 1 of 1

## (undated) NOTE — LETTER
[address]  [address]  Consent for Procedure/Sedation  Date: 4/25/2024         Time: 1030    I hereby authorize Dr. Ornelas, my physician and his/her assistants (if applicable), which may include medical students, residents, and/or fellows, to perform the following surgical operation/ procedure and administer such anesthesia as may be determined necessary by my physician:  Operation/Procedure name (s)  Cardiac Catheterization, Left Ventricular Cineangiography, Bilateral Selective Coronary Angiography and/or Right Heart Catheterization; possible Percutaneous Transluminal Coronary Angioplasty, Coronary Atherectomy, Coronary Stent, Intracoronary Thrombolytic therapy, Antiplatelet therapy and/or Intravascular Ultrasound on Bonifacio Mccoy   2.   I recognize that during the surgical operation/procedure, unforeseen conditions may necessitate additional or different procedures than those listed above.  I, therefore, further authorize and request that the above-named surgeon, assistants, or designees perform such procedures as are, in their judgment, necessary and desirable.    3.   My surgeon/physician has discussed prior to my surgery the potential benefits, risks and side effects of this procedure; the likelihood of achieving goals; and potential problems that might occur during recuperation.  They also discussed reasonable alternatives to the procedure, including risks, benefits, and side effects related to the alternatives and risks related to not receiving this procedure.  I have had all my questions answered and I acknowledge that no guarantee has been made as to the result that may be obtained.    4.   Should the need arise during my operation/procedure, which includes change of level of care prior to discharge, I also consent to the administration of blood and/or blood products.  Further, I understand that despite careful testing and screening of blood or blood products by  collecting agencies, I may still be subject to ill effects as a result of receiving a blood transfusion and/or blood products.  The following are some, but not all, of the potential risks that can occur: fever and allergic reactions, hemolytic reactions, transmission of diseases such as Hepatitis, AIDS and Cytomegalovirus (CMV) and fluid overload.  In the event that I wish to have an autologous transfusion of my own blood, or a directed donor transfusion, I will discuss this with my physician.  Check only if Refusing Blood or Blood Products  I understand refusal of blood or blood products as deemed necessary by my physician may have serious consequences to my condition to include possible death. I hereby assume responsibility for my refusal and release the hospital, its personnel, and my physicians from any responsibility for the consequences of my refusal.          o  Refuse      5.   I authorize the use of any specimen, organs, tissues, body parts or foreign objects that may be removed from my body during the operation/procedure for diagnosis, research or teaching purposes and their subsequent disposal by hospital authorities.  I also authorize the release of specimen test results and/or written reports to my treating physician on the hospital medical staff or other referring or consulting physicians involved in my care, at the discretion of the Pathologist or my treating physician.    6.   I consent to the photographing or videotaping of the operations or procedures to be performed, including appropriate portions of my body for medical, scientific, or educational purposes, provided my identity is not revealed by the pictures or by descriptive texts accompanying them.  If the procedure has been photographed/videotaped, the surgeon will obtain the original picture, image, videotape or CD.  The hospital will not be responsible for storage, release or maintenance of the picture, image, tape or CD.    7.   I consent  to the presence of a  or observers in the operating room as deemed necessary by my physician or their designees.    8.   I recognize that in the event my procedure results in extended X-Ray/fluoroscopy time, I may develop a skin reaction.    9. If I have a Do Not Attempt Resuscitation (DNAR) order in place, that status will be suspended while in the operating room, procedural suite, and during the recovery period unless otherwise explicitly stated by me (or a person authorized to consent on my behalf). The surgeon or my attending physician will determine when the applicable recovery period ends for purposes of reinstating the DNAR order.  10. Patients having a sterilization procedure: I understand that if the procedure is successful the results will be permanent and it will therefore be impossible for me to inseminate, conceive, or bear children.  I also understand that the procedure is intended to result in sterility, although the result has not been guaranteed.   11. I acknowledge that my physician has explained sedation/analgesia administration to me including the risk and benefits I consent to the administration of sedation/analgesia as may be necessary or desirable in the judgment of my physician.    I CERTIFY THAT I HAVE READ AND FULLY UNDERSTAND THE ABOVE CONSENT TO OPERATION and/or OTHER PROCEDURE.      ____________________________________       _________________________________      ______________________________  Signature of Patient         Signature of Responsible Person        Printed Name of Responsible Person   ____________________________________      _________________________________      ______________________________       Signature of Witness          Relationship to Patient                       Date                                       Time  Patient Name: Bonifacio Mccoy  : 1940    Reviewed: 2024   Printed: 2024  Medical Record #: [id number] Page 1 of 1

## (undated) NOTE — LETTER
[facility] [address]  [address] [facility] [address] [facility] [address]  [phone]    Blood Transfusion Consent    In the course of your treatment, it may become necessary to administer a transfusion of blood or blood components. This form provides basic information concerning this procedure and, if signed by you, authorizes its administration. By signing this form, you agree that all of your questions about the administration of blood or blood products have been answered by the ordering medical professional or designee.    Description of Procedure  Blood is introduced into one of your veins, commonly in the arm, using a sterilized disposable needle. The amount of blood transfused, and whether the transfusion will be of blood or blood components is a judgement the physician will make based on your particular needs.    Risks  The transfusion is a common procedure of low risk.  MINOR AND TEMPORARY REACTIONS ARE NOT UNCOMMON, including a slight bruise, swelling or local reaction in the area where the needle pierces your skin, or a nonserious reaction to the transfused material itself, including headache, fever or mild skin reaction, such as rash.  Serious reactions are possible, though very unlikely, and include severe allergic reaction (shock) and destruction (hemolysis) of transfused blood cells.  Infectious diseases which are known to be transmitted by blood transfusion include certain types of viral Hepatitis(liver infection from a virus), Human Immunodeficiency Virus (HIV-1,2) infection, a viral infection known to cause Acquired Immunodeficiency Syndrome (AIDS), as well as certain other bacterial, viral, and parasitic diseases. While a minimal risk of acquiring an infectious disease from transfused blood exists, in accordance with the Federal and State law, all due care has been taken in donor selection and testing to avoid transmission of disease.    Alternatives  If loss of blood poses serious threats during your  treatment, THERE IS NO EFFECTIVE ALTERNATIVE TO BLOOD TRANSFUSION. However, if you have any further questions on this matter, your provider will fully explain the alternatives to you if it has not already been done.    I, ______________________________, have read/had read to me the above. I understand the matters bearing on the decision whether or not to authorize a transfusion of blood or blood components. I have no questions which have not been answered to my full satisfaction. I hereby consent to such transfusion as my physician may deem necessary or advisable in the course of my treatment.    ______________________________________________                    ___________________________  (Signature of Patient or Responsible party in case of minor,                 (Printed Name of Patient or incompetent, or unconscious patient)              Responsible Party)    ___________________________               _____________________  (Relationship to Patient if not self)                                    (Date and Time)    __________________________                                                           ______________________              (Signature of Witness)               (Printed Name of Witness)     Language line ()    Telephone/Verbal/Video Consent    __________________________                     ____________________  (Signature of 2nd Witness           (Printed Name of 2nd  Telephone/Verbal/Video Consent)           Witness)    Patient Name: Bonifacio Mccoy     : 1940                 Printed: 2024     Medical Record #: [id number]      Rev: 2023

## (undated) NOTE — LETTER
[address]  [address]  Consent for Procedure/Sedation  Date: July 30. 2024         Time: 1100    I hereby authorize Dr. Ornelas, my physician and his/her assistants (if applicable), which may include medical students, residents, and/or fellows, to perform the following surgical operation/ procedure and administer such anesthesia as may be determined necessary by my physician:  Operation/Procedure name (s)  Cardiac Catheterization, Left Ventricular Cineangiography, Bilateral Selective Coronary Angiography and/or Right Heart Catheterization; possible Percutaneous Transluminal Coronary Angioplasty, Coronary Atherectomy, Coronary Stent, Intracoronary Thrombolytic therapy, Antiplatelet therapy and/or Intravascular Ultrasound on Bonifacio Mccoy   2.   I recognize that during the surgical operation/procedure, unforeseen conditions may necessitate additional or different procedures than those listed above.  I, therefore, further authorize and request that the above-named surgeon, assistants, or designees perform such procedures as are, in their judgment, necessary and desirable.    3.   My surgeon/physician has discussed prior to my surgery the potential benefits, risks and side effects of this procedure; the likelihood of achieving goals; and potential problems that might occur during recuperation.  They also discussed reasonable alternatives to the procedure, including risks, benefits, and side effects related to the alternatives and risks related to not receiving this procedure.  I have had all my questions answered and I acknowledge that no guarantee has been made as to the result that may be obtained.    4.   Should the need arise during my operation/procedure, which includes change of level of care prior to discharge, I also consent to the administration of blood and/or blood products.  Further, I understand that despite careful testing and screening of blood or blood products by  collecting agencies, I may still be subject to ill effects as a result of receiving a blood transfusion and/or blood products.  The following are some, but not all, of the potential risks that can occur: fever and allergic reactions, hemolytic reactions, transmission of diseases such as Hepatitis, AIDS and Cytomegalovirus (CMV) and fluid overload.  In the event that I wish to have an autologous transfusion of my own blood, or a directed donor transfusion, I will discuss this with my physician.  Check only if Refusing Blood or Blood Products  I understand refusal of blood or blood products as deemed necessary by my physician may have serious consequences to my condition to include possible death. I hereby assume responsibility for my refusal and release the hospital, its personnel, and my physicians from any responsibility for the consequences of my refusal.          o  Refuse      5.   I authorize the use of any specimen, organs, tissues, body parts or foreign objects that may be removed from my body during the operation/procedure for diagnosis, research or teaching purposes and their subsequent disposal by hospital authorities.  I also authorize the release of specimen test results and/or written reports to my treating physician on the hospital medical staff or other referring or consulting physicians involved in my care, at the discretion of the Pathologist or my treating physician.    6.   I consent to the photographing or videotaping of the operations or procedures to be performed, including appropriate portions of my body for medical, scientific, or educational purposes, provided my identity is not revealed by the pictures or by descriptive texts accompanying them.  If the procedure has been photographed/videotaped, the surgeon will obtain the original picture, image, videotape or CD.  The hospital will not be responsible for storage, release or maintenance of the picture, image, tape or CD.    7.   I consent  to the presence of a  or observers in the operating room as deemed necessary by my physician or their designees.    8.   I recognize that in the event my procedure results in extended X-Ray/fluoroscopy time, I may develop a skin reaction.    9. If I have a Do Not Attempt Resuscitation (DNAR) order in place, that status will be suspended while in the operating room, procedural suite, and during the recovery period unless otherwise explicitly stated by me (or a person authorized to consent on my behalf). The surgeon or my attending physician will determine when the applicable recovery period ends for purposes of reinstating the DNAR order.  10. Patients having a sterilization procedure: I understand that if the procedure is successful the results will be permanent and it will therefore be impossible for me to inseminate, conceive, or bear children.  I also understand that the procedure is intended to result in sterility, although the result has not been guaranteed.   11. I acknowledge that my physician has explained sedation/analgesia administration to me including the risk and benefits I consent to the administration of sedation/analgesia as may be necessary or desirable in the judgment of my physician.    I CERTIFY THAT I HAVE READ AND FULLY UNDERSTAND THE ABOVE CONSENT TO OPERATION and/or OTHER PROCEDURE.      ____________________________________       _________________________________      ______________________________  Signature of Patient         Signature of Responsible Person        Printed Name of Responsible Person   ____________________________________      _________________________________      ______________________________       Signature of Witness          Relationship to Patient                       Date                                       Time  Patient Name: Bonifacio Mccoy  : 1940    Reviewed: 2024   Printed: 2024  Medical Record #: [id number] Page 1  of 1